# Patient Record
Sex: FEMALE | Race: WHITE | NOT HISPANIC OR LATINO | Employment: PART TIME | URBAN - METROPOLITAN AREA
[De-identification: names, ages, dates, MRNs, and addresses within clinical notes are randomized per-mention and may not be internally consistent; named-entity substitution may affect disease eponyms.]

---

## 2017-10-11 ENCOUNTER — GENERIC CONVERSION - ENCOUNTER (OUTPATIENT)
Dept: OTHER | Facility: OTHER | Age: 62
End: 2017-10-11

## 2018-01-16 NOTE — MISCELLANEOUS
Message   Recorded as Task   Date: 07/26/2016 03:33 PM, Created By: Juan James   Task Name: Follow Up   Assigned To: Don Olsen   Regarding Patient: Alana Brown, Status: In Progress   Comment:    Tamika Yang - 26 Jul 2016 3:33 PM     TASK CREATED  Caller: Self; General Medical Question; [phone] (Home)  PT CALLED AND IS DOING BETTER  FEELING FINE TODAY, HAD BEEN TOLD TO CALL AND LET YOU KNOW HOW SHE WAS DOING  Bartoloyamilex Gabriel Gould - 26 Jul 2016 7:39 PM     TASK REASSIGNED: Previously Assigned To Gabriel Guillory     tell abel to stay on the medication for a while   come in to see me in a month   Niurka,April - 27 Jul 2016 8:00 AM     TASK EDITED  Patient was called and informed, she states she will call back to make an appt   Niurka,April - 27 Jul 2016 8:00 AM     TASK IN PROGRESS        Active Problems    1  Adverse Effect Of Ampicillin (E930 0)   2  Allergic rhinitis (477 9) (J30 9)   3  Arthritis (716 90) (M19 90)   4  Blood tests for routine general physical examination (V72 62) (Z00 00)   5  Breast cancer screening (V76 10) (Z12 39)   6  Colitis (558 9) (K52 9)   7  Compression Arthralgia Of The Ankle / Foot (719 47)   8  Diverticulosis (562 10) (K57 90)   9  Influenza vaccine needed (V04 81) (Z23)   10  Insect bites (919 4,E906 4) (W57 XXXA)   11  Internal Hemorrhoids (455 0)   12  Lower abdominal pain (789 09) (R10 30)   13  Need for pneumococcal vaccine (V03 82) (Z23)   14  Obesity (278 00) (E66 9)   15  Reactive airway disease (493 90) (J45 909)   16  Rectal polyp (569 0) (K62 1)   17  Screening for colon cancer (V76 51) (Z12 11)   18  Splinter of finger without major open wound with infection (915 7) (S60 459A,L08 9)    Current Meds   1  Aleve 220 MG Oral Tablet; 2 tabs 2x daily; Therapy: (Recorded:[id number]) to Recorded   2  LORazepam 0 5 MG Oral Tablet; TAKE 1 TABLET TWICE DAILY; Therapy: [id number] to (Evaluate:68Njs8023); Last Rx:[id number] Ordered    Allergies    1   Ampicillin CAPS 2  Anaprox TABS   3  Cephalosporins   4   Penicillins    Signatures   Electronically signed by : Josy Muñiz, ; Jul 27 2016  8:01AM EST                       (Author)

## 2019-12-11 ENCOUNTER — OFFICE VISIT (OUTPATIENT)
Dept: FAMILY MEDICINE CLINIC | Facility: CLINIC | Age: 64
End: 2019-12-11
Payer: COMMERCIAL

## 2019-12-11 VITALS
HEIGHT: 65 IN | OXYGEN SATURATION: 97 % | WEIGHT: 180.6 LBS | SYSTOLIC BLOOD PRESSURE: 108 MMHG | BODY MASS INDEX: 30.09 KG/M2 | DIASTOLIC BLOOD PRESSURE: 76 MMHG | TEMPERATURE: 100.2 F | HEART RATE: 91 BPM

## 2019-12-11 DIAGNOSIS — R05.9 COUGH: ICD-10-CM

## 2019-12-11 DIAGNOSIS — J06.9 UPPER RESPIRATORY TRACT INFECTION, UNSPECIFIED TYPE: ICD-10-CM

## 2019-12-11 DIAGNOSIS — J02.9 SORE THROAT: Primary | ICD-10-CM

## 2019-12-11 LAB — S PYO AG THROAT QL: NEGATIVE

## 2019-12-11 PROCEDURE — 87880 STREP A ASSAY W/OPTIC: CPT | Performed by: NURSE PRACTITIONER

## 2019-12-11 PROCEDURE — 99213 OFFICE O/P EST LOW 20 MIN: CPT | Performed by: NURSE PRACTITIONER

## 2019-12-11 PROCEDURE — 1036F TOBACCO NON-USER: CPT | Performed by: NURSE PRACTITIONER

## 2019-12-11 PROCEDURE — 3008F BODY MASS INDEX DOCD: CPT | Performed by: NURSE PRACTITIONER

## 2019-12-11 RX ORDER — DOXYCYCLINE 100 MG/1
100 CAPSULE ORAL 2 TIMES DAILY
Qty: 14 CAPSULE | Refills: 0 | Status: SHIPPED | OUTPATIENT
Start: 2019-12-11 | End: 2019-12-18

## 2019-12-11 RX ORDER — BENZONATATE 100 MG/1
100 CAPSULE ORAL 3 TIMES DAILY PRN
Qty: 20 CAPSULE | Refills: 0 | Status: SHIPPED | OUTPATIENT
Start: 2019-12-11 | End: 2020-11-05 | Stop reason: ALTCHOICE

## 2019-12-11 RX ORDER — NAPROXEN SODIUM 220 MG
TABLET ORAL
COMMUNITY
End: 2020-11-05 | Stop reason: ALTCHOICE

## 2019-12-11 RX ORDER — EFINACONAZOLE 100 MG/ML
SOLUTION TOPICAL
COMMUNITY
Start: 2019-10-21 | End: 2021-12-13

## 2019-12-11 NOTE — PROGRESS NOTES
Assessment/Plan   Problem List Items Addressed This Visit     None      Visit Diagnoses     Sore throat    -  Primary    Relevant Orders    POCT rapid strepA (Completed)    Throat culture    Upper respiratory tract infection, unspecified type        Relevant Medications    doxycycline monohydrate (MONODOX) 100 mg capsule    Cough        Relevant Medications    benzonatate (TESSALON PERLES) 100 mg capsule                Chief Complaint   Patient presents with    Cold Like Symptoms     headache, cough, off and on fever for 4 days        Subjective   Patient ID: Charles Allen is a 75 y o  female  Vitals:    12/11/19 1037   BP: 108/76   Pulse: 91   Temp: 100 2 °F (37 9 °C)   SpO2: 97%     Sinusitis   This is a new problem  The current episode started in the past 7 days  The problem has been gradually worsening since onset  The maximum temperature recorded prior to her arrival was 101 - 101 9 F  The fever has been present for 1 to 2 days  Her pain is at a severity of 5/10  The pain is moderate  Associated symptoms include chills, congestion, coughing, ear pain, headaches, sinus pressure, a sore throat and swollen glands  Pertinent negatives include no neck pain or shortness of breath  Past treatments include acetaminophen  The treatment provided mild relief  The following portions of the patient's history were reviewed and updated as appropriate: allergies, current medications, past family history, past social history, past surgical history and problem list     Review of Systems   Constitutional: Positive for chills  HENT: Positive for congestion, ear pain, sinus pressure and sore throat  Eyes: Negative  Respiratory: Positive for cough  Negative for shortness of breath  Cardiovascular: Negative  Gastrointestinal: Negative  Endocrine: Negative  Genitourinary: Negative  Musculoskeletal: Negative  Negative for neck pain  Skin: Negative  Allergic/Immunologic: Negative      Neurological: Positive for headaches  Hematological: Negative  Psychiatric/Behavioral: Negative  Objective     Physical Exam   Constitutional: She is oriented to person, place, and time  She appears well-developed and well-nourished  No distress  HENT:   Head: Normocephalic and atraumatic  Right Ear: External ear normal  Tympanic membrane is not perforated, not erythematous and not bulging  A middle ear effusion is present  Left Ear: External ear normal  Tympanic membrane is erythematous and bulging  Tympanic membrane is not perforated  A middle ear effusion is present  Nose: Mucosal edema and sinus tenderness present  Right sinus exhibits frontal sinus tenderness  Right sinus exhibits no maxillary sinus tenderness  Left sinus exhibits frontal sinus tenderness  Mouth/Throat: Uvula is midline and mucous membranes are normal  Posterior oropharyngeal erythema present  Tonsils are 1+ on the right  Tonsils are 1+ on the left  No tonsillar exudate  Eyes: Conjunctivae are normal  Right eye exhibits no discharge  Left eye exhibits no discharge  Neck: Normal range of motion  Neck supple  No thyromegaly present  Cardiovascular: Normal rate, regular rhythm, normal heart sounds and intact distal pulses  No murmur heard  Pulmonary/Chest: Effort normal and breath sounds normal    Abdominal: Soft  Bowel sounds are normal  She exhibits no distension  There is no tenderness  Musculoskeletal: Normal range of motion  She exhibits no edema or deformity  Lymphadenopathy:     She has cervical adenopathy  Neurological: She is alert and oriented to person, place, and time  Skin: Skin is warm and dry  Capillary refill takes less than 2 seconds  She is not diaphoretic  Psychiatric: She has a normal mood and affect  Her behavior is normal  Judgment and thought content normal    Nursing note and vitals reviewed      Allergies   Allergen Reactions    Naproxen     Penicillins      There is no problem list on file for this patient  Current Outpatient Medications:     JUBLIA 10 % SOLN, , Disp: , Rfl:     naproxen sodium (ALEVE) 220 MG tablet, Take by mouth, Disp: , Rfl:     benzonatate (TESSALON PERLES) 100 mg capsule, Take 1 capsule (100 mg total) by mouth 3 (three) times a day as needed for cough, Disp: 20 capsule, Rfl: 0    doxycycline monohydrate (MONODOX) 100 mg capsule, Take 1 capsule (100 mg total) by mouth 2 (two) times a day for 7 days, Disp: 14 capsule, Rfl: 0  Social History     Socioeconomic History    Marital status: /Civil Union     Spouse name: Not on file    Number of children: Not on file    Years of education: Not on file    Highest education level: Not on file   Occupational History    Not on file   Social Needs    Financial resource strain: Not on file    Food insecurity:     Worry: Not on file     Inability: Not on file    Transportation needs:     Medical: Not on file     Non-medical: Not on file   Tobacco Use    Smoking status: Never Smoker    Smokeless tobacco: Never Used   Substance and Sexual Activity    Alcohol use: Not on file    Drug use: Not on file    Sexual activity: Not on file   Lifestyle    Physical activity:     Days per week: Not on file     Minutes per session: Not on file    Stress: Not on file   Relationships    Social connections:     Talks on phone: Not on file     Gets together: Not on file     Attends Gnosticism service: Not on file     Active member of club or organization: Not on file     Attends meetings of clubs or organizations: Not on file     Relationship status: Not on file    Intimate partner violence:     Fear of current or ex partner: Not on file     Emotionally abused: Not on file     Physically abused: Not on file     Forced sexual activity: Not on file   Other Topics Concern    Not on file   Social History Narrative    Not on file     No family history on file

## 2019-12-11 NOTE — PATIENT INSTRUCTIONS
1  Try tessalon pearls for cough  2  Coricidin HBP for sinus congestion  3   Antibiotic - doxycycline  Twice daily

## 2019-12-14 LAB — B-HEM STREP SPEC QL CULT: NEGATIVE

## 2020-11-05 ENCOUNTER — OFFICE VISIT (OUTPATIENT)
Dept: FAMILY MEDICINE CLINIC | Facility: CLINIC | Age: 65
End: 2020-11-05
Payer: MEDICARE

## 2020-11-05 VITALS
SYSTOLIC BLOOD PRESSURE: 130 MMHG | HEART RATE: 93 BPM | DIASTOLIC BLOOD PRESSURE: 78 MMHG | TEMPERATURE: 98.5 F | OXYGEN SATURATION: 99 % | BODY MASS INDEX: 31.22 KG/M2 | WEIGHT: 187.4 LBS | HEIGHT: 65 IN

## 2020-11-05 DIAGNOSIS — Z23 NEED FOR SHINGLES VACCINE: ICD-10-CM

## 2020-11-05 DIAGNOSIS — Z23 NEED FOR INFLUENZA VACCINATION: ICD-10-CM

## 2020-11-05 DIAGNOSIS — Z13.6 SCREENING FOR CARDIOVASCULAR CONDITION: ICD-10-CM

## 2020-11-05 DIAGNOSIS — Z11.59 NEED FOR HEPATITIS C SCREENING TEST: ICD-10-CM

## 2020-11-05 DIAGNOSIS — Z23 NEED FOR VACCINATION WITH 13-POLYVALENT PNEUMOCOCCAL CONJUGATE VACCINE: ICD-10-CM

## 2020-11-05 DIAGNOSIS — Z13.1 SCREENING FOR DIABETES MELLITUS: ICD-10-CM

## 2020-11-05 DIAGNOSIS — Z00.00 MEDICARE ANNUAL WELLNESS VISIT, INITIAL: Primary | ICD-10-CM

## 2020-11-05 PROCEDURE — 90670 PCV13 VACCINE IM: CPT | Performed by: FAMILY MEDICINE

## 2020-11-05 PROCEDURE — G0438 PPPS, INITIAL VISIT: HCPCS | Performed by: FAMILY MEDICINE

## 2020-11-05 PROCEDURE — G0008 ADMIN INFLUENZA VIRUS VAC: HCPCS | Performed by: FAMILY MEDICINE

## 2020-11-05 PROCEDURE — 90662 IIV NO PRSV INCREASED AG IM: CPT | Performed by: FAMILY MEDICINE

## 2020-11-05 PROCEDURE — G0009 ADMIN PNEUMOCOCCAL VACCINE: HCPCS | Performed by: FAMILY MEDICINE

## 2020-11-05 RX ORDER — IBUPROFEN 200 MG
200 TABLET ORAL EVERY 6 HOURS PRN
COMMUNITY

## 2020-11-05 RX ORDER — ZOSTER VACCINE RECOMBINANT, ADJUVANTED 50 MCG/0.5
0.5 KIT INTRAMUSCULAR ONCE
Qty: 1 EACH | Refills: 1 | Status: SHIPPED | OUTPATIENT
Start: 2020-11-05 | End: 2020-11-05

## 2020-11-18 LAB
ALBUMIN SERPL-MCNC: 4.5 G/DL (ref 3.8–4.8)
ALBUMIN/GLOB SERPL: 2 {RATIO} (ref 1.2–2.2)
ALP SERPL-CCNC: 67 IU/L (ref 39–117)
ALT SERPL-CCNC: 11 IU/L (ref 0–32)
AST SERPL-CCNC: 15 IU/L (ref 0–40)
BILIRUB SERPL-MCNC: 0.9 MG/DL (ref 0–1.2)
BUN SERPL-MCNC: 17 MG/DL (ref 8–27)
BUN/CREAT SERPL: 29 (ref 12–28)
CALCIUM SERPL-MCNC: 9.5 MG/DL (ref 8.7–10.3)
CHLORIDE SERPL-SCNC: 103 MMOL/L (ref 96–106)
CHOLEST SERPL-MCNC: 198 MG/DL (ref 100–199)
CHOLEST/HDLC SERPL: 3.5 RATIO (ref 0–4.4)
CO2 SERPL-SCNC: 23 MMOL/L (ref 20–29)
CREAT SERPL-MCNC: 0.58 MG/DL (ref 0.57–1)
GLOBULIN SER-MCNC: 2.3 G/DL (ref 1.5–4.5)
GLUCOSE SERPL-MCNC: 91 MG/DL (ref 65–99)
HCV AB S/CO SERPL IA: <0.1 S/CO RATIO (ref 0–0.9)
HDLC SERPL-MCNC: 57 MG/DL
LDLC SERPL CALC-MCNC: 127 MG/DL (ref 0–99)
POTASSIUM SERPL-SCNC: 4.3 MMOL/L (ref 3.5–5.2)
PROT SERPL-MCNC: 6.8 G/DL (ref 6–8.5)
SL AMB EGFR AFRICAN AMERICAN: 111 ML/MIN/1.73
SL AMB EGFR NON AFRICAN AMERICAN: 96 ML/MIN/1.73
SL AMB VLDL CHOLESTEROL CALC: 14 MG/DL (ref 5–40)
SODIUM SERPL-SCNC: 140 MMOL/L (ref 134–144)
TRIGL SERPL-MCNC: 76 MG/DL (ref 0–149)

## 2020-12-03 ENCOUNTER — OFFICE VISIT (OUTPATIENT)
Dept: URGENT CARE | Facility: CLINIC | Age: 65
End: 2020-12-03
Payer: MEDICARE

## 2020-12-03 VITALS
OXYGEN SATURATION: 98 % | SYSTOLIC BLOOD PRESSURE: 132 MMHG | HEART RATE: 80 BPM | TEMPERATURE: 98.5 F | BODY MASS INDEX: 30.66 KG/M2 | RESPIRATION RATE: 16 BRPM | DIASTOLIC BLOOD PRESSURE: 74 MMHG | WEIGHT: 184 LBS | HEIGHT: 65 IN

## 2020-12-03 DIAGNOSIS — S61.451A HUMAN BITE OF RIGHT HAND, INITIAL ENCOUNTER: Primary | ICD-10-CM
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
## 2020-12-03 DIAGNOSIS — Z23 NEED FOR TETANUS BOOSTER: ICD-10-CM

## 2020-12-03 PROCEDURE — 99213 OFFICE O/P EST LOW 20 MIN: CPT | Performed by: PHYSICIAN ASSISTANT

## 2020-12-03 PROCEDURE — 90715 TDAP VACCINE 7 YRS/> IM: CPT

## 2020-12-03 PROCEDURE — G0463 HOSPITAL OUTPT CLINIC VISIT: HCPCS | Performed by: PHYSICIAN ASSISTANT

## 2020-12-03 PROCEDURE — 90471 IMMUNIZATION ADMIN: CPT | Performed by: PHYSICIAN ASSISTANT

## 2020-12-03 RX ORDER — DOXYCYCLINE HYCLATE 100 MG/1
100 CAPSULE ORAL EVERY 12 HOURS SCHEDULED
Qty: 14 CAPSULE | Refills: 0 | Status: SHIPPED | OUTPATIENT
Start: 2020-12-03 | End: 2020-12-10

## 2021-03-04 DIAGNOSIS — Z23 ENCOUNTER FOR IMMUNIZATION: ICD-10-CM

## 2021-03-18 ENCOUNTER — IMMUNIZATIONS (OUTPATIENT)
Dept: FAMILY MEDICINE CLINIC | Facility: HOSPITAL | Age: 66
End: 2021-03-18

## 2021-03-18 DIAGNOSIS — Z23 ENCOUNTER FOR IMMUNIZATION: Primary | ICD-10-CM

## 2021-03-18 PROCEDURE — 0001A SARS-COV-2 / COVID-19 MRNA VACCINE (PFIZER-BIONTECH) 30 MCG: CPT

## 2021-03-18 PROCEDURE — 91300 SARS-COV-2 / COVID-19 MRNA VACCINE (PFIZER-BIONTECH) 30 MCG: CPT

## 2021-04-12 ENCOUNTER — IMMUNIZATIONS (OUTPATIENT)
Dept: FAMILY MEDICINE CLINIC | Facility: HOSPITAL | Age: 66
End: 2021-04-12

## 2021-04-12 DIAGNOSIS — Z23 ENCOUNTER FOR IMMUNIZATION: Primary | ICD-10-CM

## 2021-04-12 PROCEDURE — 91300 SARS-COV-2 / COVID-19 MRNA VACCINE (PFIZER-BIONTECH) 30 MCG: CPT

## 2021-04-12 PROCEDURE — 0002A SARS-COV-2 / COVID-19 MRNA VACCINE (PFIZER-BIONTECH) 30 MCG: CPT

## 2021-04-19 ENCOUNTER — ANNUAL EXAM (OUTPATIENT)
Dept: OBGYN CLINIC | Facility: CLINIC | Age: 66
End: 2021-04-19
Payer: MEDICARE

## 2021-04-19 VITALS
DIASTOLIC BLOOD PRESSURE: 72 MMHG | HEIGHT: 63 IN | BODY MASS INDEX: 32.85 KG/M2 | WEIGHT: 185.4 LBS | SYSTOLIC BLOOD PRESSURE: 120 MMHG

## 2021-04-19 DIAGNOSIS — Z12.31 BREAST CANCER SCREENING BY MAMMOGRAM: Primary | ICD-10-CM

## 2021-04-19 DIAGNOSIS — N81.2 CYSTOCELE AND RECTOCELE WITH INCOMPLETE UTEROVAGINAL PROLAPSE: ICD-10-CM

## 2021-04-19 DIAGNOSIS — N95.2 VAGINAL ATROPHY: ICD-10-CM

## 2021-04-19 DIAGNOSIS — E28.39 ESTROGEN DEFICIENCY: ICD-10-CM

## 2021-04-19 DIAGNOSIS — Z46.89 PESSARY MAINTENANCE: ICD-10-CM

## 2021-04-19 PROBLEM — Z96.0 VAGINAL PESSARY IN SITU: Status: ACTIVE | Noted: 2021-04-19

## 2021-04-19 PROCEDURE — G0101 CA SCREEN;PELVIC/BREAST EXAM: HCPCS | Performed by: STUDENT IN AN ORGANIZED HEALTH CARE EDUCATION/TRAINING PROGRAM

## 2021-04-19 RX ORDER — ESTRADIOL 0.1 MG/G
1 CREAM VAGINAL WEEKLY
COMMUNITY
Start: 2020-01-06 | End: 2021-12-13

## 2021-04-19 NOTE — PATIENT INSTRUCTIONS
Recommended calcium intake: 1200mg daily  Recommended vitamin D intake: 600 IU daily (prior to age 79), 800 IU (age 79 and older)    Bone Densitometry   WHAT YOU NEED TO KNOW:   What is bone densitometry? Bone densitometry is a scan (test) that measures bone density  A loss of density may increase your risk for osteoporosis  Bone densitometry is also called a dual energy x-ray absorptiometry (DXA) scan  DXA scans use x-rays to show if your bones have lost minerals, such as calcium, causing them to become weak  DXA scans are usually done on your hip, spine, or forearm  A DXA scan can also be done of your entire body  Why do I need a DXA scan? You may need a DXA scan to diagnose osteoporosis, or to check your risk of bone fractures  Your healthcare provider can also monitor changes in your bone density  A DXA scan is recommended for healthy women 72 years or older, and healthy men 79 years or older  The scan is also recommended for younger women and men who are at high risk for bone loss  What increases my risk for bone loss? · Eating foods low in calcium or vitamin D    · Low body weight or low estrogen levels in women    · Health conditions such as diabetes, overactive thyroid, or celiac disease    · Medicines such as steroids, and androgen deprivation therapy for men    · Previous bone fracture    · Prolonged immobilization    · Smoking or abuse of alcohol  What do I need to know about bone densitometry? · Before your DXA scan  you may be told not to take calcium supplements the day of your scan  Remove any metal that is near the body area being scanned  This includes jewelry, clothing with zippers, coins, body piercings, or an underwire bra  · During the scan  you will lie on the DXA scan table  The scanner will pass over the area and take pictures  Keep still during your scan so the pictures of your bones are clear  The DXA scan lasts between 10 and 30 minutes, depending on the area being scanned  When should I contact my healthcare provider? · You will be late or cannot make it to your DXA scan  · You think you are pregnant  When should I seek immediate care or call 911? · You fall and think you may have a bone fracture  · Your condition or symptoms suddenly get worse  CARE AGREEMENT:   You have the right to help plan your care  Learn about your health condition and how it may be treated  Discuss treatment options with your caregivers to decide what care you want to receive  You always have the right to refuse treatment  The above information is an  only  It is not intended as medical advice for individual conditions or treatments  Talk to your doctor, nurse or pharmacist before following any medical regimen to see if it is safe and effective for you  © 2017 [employer] Information is for End User's use only and may not be sold, redistributed or otherwise used for commercial purposes  All illustrations and images included in CareNotes® are the copyrighted property of [employer]  or Walter Calvo

## 2021-04-19 NOTE — PROGRESS NOTES
[address] [facility]   [address], [place], [place]    ASSESSMENT/PLAN: Thierry Galloway is a 77 y o  [id number] who presents for annual gynecologic exam     Encounter for routine gynecologic examination  - Routine well woman exam completed today  - Cervical Cancer Screening complete   - STI screening offered including HIV  Declines, as she is not sexually active  Has had Hep C screening    - Breast Cancer Screening: Last Mammogram 01/30/2020,   - Colorectal cancer screening was not ordered  She is up to date  - Osteoporosis screening: Never had  Ordered today  - The following were reviewed in today's visit: breast self exam, mammography screening ordered, use and side effects of HRT, menopause, exercise, healthy diet and DEXA ordered  - Pessary removed, cleaned, and replaced  No evidence of erosion  Additional problems addressed during this visit:  1  Breast cancer screening by mammogram  -     Mammo screening bilateral w 3d & cad; Future; Expected date: 04/15/2022    2  Vaginal atrophy  Assessment & Plan:  - Stable atrophy  Will continue vaginal estrogen once weekly  3  Estrogen deficiency  -     DXA bone density spine hip and pelvis; Future    4  Cystocele and rectocele with incomplete uterovaginal prolapse    5  Pessary maintenance      CC:  Annual Gynecologic Examination    HPI: Thierry Galloway is a 77 y o  [id number] who presents for annual gynecologic examination       ROS: Negative except as noted in HPI    PHQ-A Depression Screening    Feeling down, depressed, irritable or hopeless: 0 - not at all  Little interest or pleasure in doing things: 0 - not at all         The following portions of the patient's history were reviewed and updated as appropriate:  Past Medical History:   Diagnosis Date    Obesity     Papanicolaou smear 01/27/2020    neg    Rectocele     Uterine prolapse 2020     Past Surgical History:   Procedure Laterality Date   [address] [facility] [address]    x7    HERNIA REPAIR  2013    [facility]    NEUROPLASTY / TRANSPOSITION MEDIAN NERVE AT CARPAL TUNNEL  2012     Family History   Problem Relation Age of Onset    Stroke Father     Heart disease Father     Diabetes Father     Hypertension Maternal Grandmother     Hypertension Paternal Grandmother      Social History     Socioeconomic History    Marital status: /Civil Union     Spouse name: None    Number of children: 1    Years of education: None    Highest education level: None   Occupational History    None   Social Needs    Financial resource strain: None    Food insecurity     Worry: None     Inability: None    Transportation needs     Medical: None     Non-medical: None   Tobacco Use    Smoking status: Never Smoker    Smokeless tobacco: Never Used   Substance and Sexual Activity    Alcohol use: Yes     Frequency: 2-3 times a week     Drinks per session: 1 or 2     Binge frequency: Never    Drug use: Never    Sexual activity: Not Currently     Birth control/protection: Post-menopausal   Lifestyle    Physical activity     Days per week: None     Minutes per session: None    Stress: None   Relationships    Social connections     Talks on phone: None     Gets together: None     Attends Yarsani service: None     Active member of club or organization: None     Attends meetings of clubs or organizations: None     Relationship status:     Intimate partner violence     Fear of current or ex partner: No     Emotionally abused: No     Physically abused: No     Forced sexual activity: No   Other Topics Concern    None   Social History Narrative    Exercises occasionally     Outpatient Medications Marked as Taking for the 4/19/21 encounter (Annual Exam) with Regi Leo MD   Medication    estradiol (ESTRACE) 0 1 mg/g vaginal cream    ibuprofen (MOTRIN) 200 mg tablet    JUBLIA 10 % SOLN     Allergies   Allergen Reactions    Naproxen Anaphylaxis    Penicillins Rash         Objective:  /72   Ht 5' 2 5" (1 588 m)   Wt 84 1 kg (185 lb 6 4 oz)   Breastfeeding No   BMI 33 37 kg/m²      General Appearance: alert and oriented, in no acute distress  Neck/Thyroid: No thyromegaly, no thyroid nodules  Respiratory: Unlabored breathing  Cardiovascular: Regular rate, no peripheral edema  Abdomen: Soft, non-tender, non-distended, no masses, no rebound or guarding  Breast Exam: No dimpling, nipple retraction or discharge  No lumps or masses  No axillary or supraclavicular nodes  Pelvic:       External genitalia: Normal appearance, no abnormal pigmentation, no lesions or masses  Normal [place]th[place]'s [place]'s  Urinary system: Urethral meatus normal, bladder non-tender  Vaginal: Vaginal pessary removed, cleaned, and replaced  Stable grade 3 prolapse  Stable vaginal atrophy without erosions  Normal-appearing physiologic discharge  Cervix: Normal, no masses  No cervical motion tenderness  Adnexa: No adnexal masses or tenderness noted  Uterus: Small, normal post-menopausal size, regular contour, midline, mobile, no uterine tenderness  Rectovaginal: Normal tone  No nodularity or masses noted  Extremities: Normal range of motion  Warm, well-perfused, non-tender     Skin: normal, no rash or abnormalities  Neurologic: alert, oriented x3  Psychiatric: Appropriate affect, mood stable, cooperative with exam     Ismael Umanzor MD  4/19/2021 10:31 AM

## 2021-05-19 DIAGNOSIS — E28.39 ESTROGEN DEFICIENCY: ICD-10-CM

## 2021-05-20 DIAGNOSIS — Z12.31 BREAST CANCER SCREENING BY MAMMOGRAM: ICD-10-CM

## 2021-07-12 ENCOUNTER — OFFICE VISIT (OUTPATIENT)
Dept: OBGYN CLINIC | Facility: CLINIC | Age: 66
End: 2021-07-12
Payer: MEDICARE

## 2021-07-12 VITALS
SYSTOLIC BLOOD PRESSURE: 122 MMHG | BODY MASS INDEX: 33.63 KG/M2 | WEIGHT: 189.8 LBS | DIASTOLIC BLOOD PRESSURE: 68 MMHG | HEIGHT: 63 IN

## 2021-07-12 DIAGNOSIS — Z96.0 VAGINAL PESSARY IN SITU: ICD-10-CM

## 2021-07-12 DIAGNOSIS — N95.2 VAGINAL ATROPHY: ICD-10-CM

## 2021-07-12 DIAGNOSIS — N81.2 CYSTOCELE AND RECTOCELE WITH INCOMPLETE UTEROVAGINAL PROLAPSE: Primary | ICD-10-CM

## 2021-07-12 PROCEDURE — 99213 OFFICE O/P EST LOW 20 MIN: CPT | Performed by: STUDENT IN AN ORGANIZED HEALTH CARE EDUCATION/TRAINING PROGRAM

## 2021-07-12 NOTE — PROGRESS NOTES
[address]   [address], [facility], [place]    Assessment/Plan:  1  Cystocele and rectocele with incomplete uterovaginal prolapse    2  Vaginal pessary in situ  Assessment & Plan:  - Pessary removed, cleaned, and replaced  - No evidence of vaginal erosions or vaginitis  - Return for pessary cleaning in 3 months  3  Vaginal atrophy  Assessment & Plan:  - Stable atrophy  Continue Estrace once weekly  Subjective:   Jack Cherry is a 77 y o  [id number]   CC:   Chief Complaint   Patient presents with    Pessary Check     cleaning     HPI: Patient presents for pessary cleaning  She denies vaginal discharge, irritation, vaginal bleeding, urinary/fecal incontinence, dysuria, urinary urgency/frequency  Prolapse symptoms well controlled with pessary  ROS: Negative except as noted in HPI    The following portions of the patient's history were reviewed and updated as appropriate:   Past Medical History:   Diagnosis Date    Obesity     Papanicolaou smear 01/27/2020    neg    Rectocele     Uterine prolapse 2020     Past Surgical History:   Procedure Laterality Date    DILATION AND EVACUATION  1980    x7    HERNIA REPAIR  2013    [facility]    NEUROPLASTY / TRANSPOSITION MEDIAN NERVE AT CARPAL TUNNEL  2012     Family History   Problem Relation Age of Onset    Stroke Father     Heart disease Father     Diabetes Father     Hypertension Maternal Grandmother     Hypertension Paternal Grandmother      Social History     Socioeconomic History    Marital status: /Civil Union     Spouse name: None    Number of children: 1    Years of education: None    Highest education level: None   Occupational History    None   Tobacco Use    Smoking status: Never Smoker    Smokeless tobacco: Never Used   Vaping Use    Vaping Use: Never used   Substance and Sexual Activity    Alcohol use:  Yes    Drug use: Never    Sexual activity: Not Currently     Birth control/protection: Post-menopausal   Other Topics Concern    None   Social History Narrative    Exercises occasionally     Social Determinants of Health     Financial Resource Strain:     Difficulty of Paying Living Expenses:    Food Insecurity:     Worried About Running Out of Food in the Last Year:     [address] in the Last Year:    Transportation Needs:     Lack of Transportation (Medical):  Lack of Transportation (Non-Medical):    Physical Activity:     Days of Exercise per Week:     Minutes of Exercise per Session:    Stress:     Feeling of Stress :    Social Connections: Unknown    Frequency of Communication with Friends and Family: Not on file    Frequency of Social Gatherings with Friends and Family: Not on file    Attends Mandaeism Services: Not on file    Active Member of [employer] Group or Organizations: Not on file    Attends Club or Organization Meetings: Not on file    Marital Status:    Intimate Partner Violence: Not At Risk    Fear of Current or Ex-Partner: No    Emotionally Abused: No    Physically Abused: No    Sexually Abused: No     Outpatient Medications Marked as Taking for the 7/12/21 encounter (Office Visit) with Shauna Tse MD   Medication    Calcium Carb-Cholecalciferol 600-800 MG-UNIT TABS    estradiol (ESTRACE) 0 1 mg/g vaginal cream    ibuprofen (MOTRIN) 200 mg tablet     Allergies   Allergen Reactions    Naproxen Anaphylaxis    Ampicillin Rash    Penicillins Rash           Objective:  /68 (BP Location: Left arm, Patient Position: Sitting, Cuff Size: Large)   Ht 5' 3" (1 6 m)   Wt 86 1 kg (189 lb 12 8 oz)   Breastfeeding No   BMI 33 62 kg/m²        Chaperone present? Yes: Mary Cantu MA  General Appearance: alert and oriented, in no acute distress  Abdomen: Soft, non-tender, non-distended, no masses, no rebound or guarding    Pelvic:       External genitalia: Normal appearance, no abnormal pigmentation, no lesions or

## 2021-07-12 NOTE — ASSESSMENT & PLAN NOTE
- Pessary removed, cleaned, and replaced  - No evidence of vaginal erosions or vaginitis  - Return for pessary cleaning in 3 months

## 2021-08-25 ENCOUNTER — VBI (OUTPATIENT)
Dept: ADMINISTRATIVE | Facility: OTHER | Age: 66
End: 2021-08-25

## 2021-08-25 NOTE — TELEPHONE ENCOUNTER
Upon review of the In Basket request we were able to locate, review, and update the patient chart as requested for Pap Smear (HPV) aka Cervical Cancer Screening  Any additional questions or concerns should be emailed to the Practice Liaisons via [email or web address] email, please do not reply via In Basket      Thank you  Roxi Freedman

## 2021-10-18 ENCOUNTER — OFFICE VISIT (OUTPATIENT)
Dept: OBGYN CLINIC | Facility: CLINIC | Age: 66
End: 2021-10-18
Payer: MEDICARE

## 2021-10-18 VITALS
HEIGHT: 65 IN | DIASTOLIC BLOOD PRESSURE: 80 MMHG | BODY MASS INDEX: 31.49 KG/M2 | WEIGHT: 189 LBS | SYSTOLIC BLOOD PRESSURE: 130 MMHG

## 2021-10-18 DIAGNOSIS — Z96.0 VAGINAL PESSARY IN SITU: ICD-10-CM

## 2021-10-18 DIAGNOSIS — N81.2 CYSTOCELE AND RECTOCELE WITH INCOMPLETE UTEROVAGINAL PROLAPSE: ICD-10-CM

## 2021-10-18 DIAGNOSIS — N95.2 VAGINAL ATROPHY: ICD-10-CM

## 2021-10-18 DIAGNOSIS — T83.89XA VAGINAL EROSION SECONDARY TO PESSARY USE, INITIAL ENCOUNTER (HCC): Primary | ICD-10-CM

## 2021-10-18 DIAGNOSIS — N89.8 VAGINAL EROSION SECONDARY TO PESSARY USE, INITIAL ENCOUNTER (HCC): Primary | ICD-10-CM

## 2021-10-18 PROCEDURE — 99213 OFFICE O/P EST LOW 20 MIN: CPT | Performed by: STUDENT IN AN ORGANIZED HEALTH CARE EDUCATION/TRAINING PROGRAM

## 2021-10-20 ENCOUNTER — TELEPHONE (OUTPATIENT)
Dept: OBGYN CLINIC | Facility: CLINIC | Age: 66
End: 2021-10-20

## 2021-10-20 DIAGNOSIS — N95.2 VAGINAL ATROPHY: Primary | ICD-10-CM

## 2021-10-20 RX ORDER — ESTRADIOL 0.1 MG/G
1 CREAM VAGINAL DAILY
Qty: 42.5 G | Refills: 3 | Status: SHIPPED | OUTPATIENT
Start: 2021-10-20 | End: 2021-12-13 | Stop reason: SDUPTHER

## 2021-11-01 ENCOUNTER — OFFICE VISIT (OUTPATIENT)
Dept: OBGYN CLINIC | Facility: CLINIC | Age: 66
End: 2021-11-01
Payer: MEDICARE

## 2021-11-01 VITALS — DIASTOLIC BLOOD PRESSURE: 82 MMHG | BODY MASS INDEX: 31.28 KG/M2 | SYSTOLIC BLOOD PRESSURE: 134 MMHG | WEIGHT: 188 LBS

## 2021-11-01 DIAGNOSIS — N95.2 VAGINAL ATROPHY: ICD-10-CM

## 2021-11-01 DIAGNOSIS — N89.8 VAGINAL EROSION SECONDARY TO PESSARY USE, SUBSEQUENT ENCOUNTER: Primary | ICD-10-CM

## 2021-11-01 DIAGNOSIS — T83.89XD VAGINAL EROSION SECONDARY TO PESSARY USE, SUBSEQUENT ENCOUNTER: Primary | ICD-10-CM

## 2021-11-01 DIAGNOSIS — N81.2 CYSTOCELE AND RECTOCELE WITH INCOMPLETE UTEROVAGINAL PROLAPSE: ICD-10-CM

## 2021-11-01 PROCEDURE — 99213 OFFICE O/P EST LOW 20 MIN: CPT | Performed by: STUDENT IN AN ORGANIZED HEALTH CARE EDUCATION/TRAINING PROGRAM

## 2021-11-01 PROCEDURE — 57160 INSERT PESSARY/OTHER DEVICE: CPT | Performed by: STUDENT IN AN ORGANIZED HEALTH CARE EDUCATION/TRAINING PROGRAM

## 2021-11-08 ENCOUNTER — TELEPHONE (OUTPATIENT)
Dept: OBGYN CLINIC | Facility: CLINIC | Age: 66
End: 2021-11-08

## 2021-11-15 ENCOUNTER — OFFICE VISIT (OUTPATIENT)
Dept: OBGYN CLINIC | Facility: CLINIC | Age: 66
End: 2021-11-15
Payer: MEDICARE

## 2021-11-15 ENCOUNTER — TELEPHONE (OUTPATIENT)
Dept: OBGYN CLINIC | Facility: CLINIC | Age: 66
End: 2021-11-15

## 2021-11-15 VITALS — SYSTOLIC BLOOD PRESSURE: 132 MMHG | DIASTOLIC BLOOD PRESSURE: 84 MMHG | BODY MASS INDEX: 30.62 KG/M2 | WEIGHT: 184 LBS

## 2021-11-15 DIAGNOSIS — N95.2 VAGINAL ATROPHY: ICD-10-CM

## 2021-11-15 DIAGNOSIS — N81.3 UTERINE PROCIDENTIA: ICD-10-CM

## 2021-11-15 DIAGNOSIS — T83.89XD VAGINAL EROSION SECONDARY TO PESSARY USE, SUBSEQUENT ENCOUNTER: Primary | ICD-10-CM

## 2021-11-15 DIAGNOSIS — N89.8 VAGINAL EROSION SECONDARY TO PESSARY USE, SUBSEQUENT ENCOUNTER: Primary | ICD-10-CM

## 2021-11-15 PROCEDURE — 99213 OFFICE O/P EST LOW 20 MIN: CPT | Performed by: STUDENT IN AN ORGANIZED HEALTH CARE EDUCATION/TRAINING PROGRAM

## 2021-11-15 PROCEDURE — A4561 PESSARY RUBBER, ANY TYPE: HCPCS | Performed by: STUDENT IN AN ORGANIZED HEALTH CARE EDUCATION/TRAINING PROGRAM

## 2021-11-16 ENCOUNTER — OFFICE VISIT (OUTPATIENT)
Dept: FAMILY MEDICINE CLINIC | Facility: CLINIC | Age: 66
End: 2021-11-16
Payer: MEDICARE

## 2021-11-16 VITALS
WEIGHT: 185.6 LBS | SYSTOLIC BLOOD PRESSURE: 122 MMHG | HEIGHT: 65 IN | OXYGEN SATURATION: 100 % | TEMPERATURE: 98.6 F | RESPIRATION RATE: 17 BRPM | DIASTOLIC BLOOD PRESSURE: 78 MMHG | HEART RATE: 94 BPM | BODY MASS INDEX: 30.92 KG/M2

## 2021-11-16 DIAGNOSIS — Z00.00 MEDICARE ANNUAL WELLNESS VISIT, SUBSEQUENT: Primary | ICD-10-CM

## 2021-11-16 DIAGNOSIS — N95.2 VAGINAL ATROPHY: ICD-10-CM

## 2021-11-16 DIAGNOSIS — Z13.1 SCREENING FOR DIABETES MELLITUS: ICD-10-CM

## 2021-11-16 DIAGNOSIS — Z23 ENCOUNTER FOR IMMUNIZATION: ICD-10-CM

## 2021-11-16 DIAGNOSIS — Z13.6 SCREENING FOR CARDIOVASCULAR CONDITION: ICD-10-CM

## 2021-11-16 DIAGNOSIS — E66.9 OBESITY (BMI 30.0-34.9): ICD-10-CM

## 2021-11-16 DIAGNOSIS — T83.89XD VAGINAL EROSION SECONDARY TO PESSARY USE, SUBSEQUENT ENCOUNTER: ICD-10-CM

## 2021-11-16 DIAGNOSIS — N89.8 VAGINAL EROSION SECONDARY TO PESSARY USE, SUBSEQUENT ENCOUNTER: ICD-10-CM

## 2021-11-16 DIAGNOSIS — Z23 NEED FOR VACCINATION: ICD-10-CM

## 2021-11-16 PROBLEM — E66.811 OBESITY (BMI 30.0-34.9): Status: ACTIVE | Noted: 2021-11-16

## 2021-11-16 PROCEDURE — 90662 IIV NO PRSV INCREASED AG IM: CPT | Performed by: FAMILY MEDICINE

## 2021-11-16 PROCEDURE — 1123F ACP DISCUSS/DSCN MKR DOCD: CPT | Performed by: FAMILY MEDICINE

## 2021-11-16 PROCEDURE — G0008 ADMIN INFLUENZA VIRUS VAC: HCPCS | Performed by: FAMILY MEDICINE

## 2021-11-16 PROCEDURE — 90732 PPSV23 VACC 2 YRS+ SUBQ/IM: CPT | Performed by: FAMILY MEDICINE

## 2021-11-16 PROCEDURE — G0439 PPPS, SUBSEQ VISIT: HCPCS | Performed by: FAMILY MEDICINE

## 2021-11-16 PROCEDURE — G0009 ADMIN PNEUMOCOCCAL VACCINE: HCPCS | Performed by: FAMILY MEDICINE

## 2021-11-16 RX ORDER — ZOSTER VACCINE RECOMBINANT, ADJUVANTED 50 MCG/0.5
0.5 KIT INTRAMUSCULAR ONCE
Qty: 1 EACH | Refills: 1 | Status: SHIPPED | OUTPATIENT
Start: 2021-11-16 | End: 2021-11-16

## 2021-11-16 RX ORDER — ZOSTER VACCINE RECOMBINANT, ADJUVANTED 50 MCG/0.5
0.5 KIT INTRAMUSCULAR ONCE
Qty: 1 EACH | Refills: 1 | Status: SHIPPED | OUTPATIENT
Start: 2021-11-16 | End: 2021-11-16 | Stop reason: SDUPTHER

## 2021-12-07 ENCOUNTER — TELEPHONE (OUTPATIENT)
Dept: OBGYN CLINIC | Facility: CLINIC | Age: 66
End: 2021-12-07

## 2021-12-13 ENCOUNTER — OFFICE VISIT (OUTPATIENT)
Dept: OBGYN CLINIC | Facility: CLINIC | Age: 66
End: 2021-12-13
Payer: MEDICARE

## 2021-12-13 VITALS
WEIGHT: 185.4 LBS | SYSTOLIC BLOOD PRESSURE: 134 MMHG | BODY MASS INDEX: 30.89 KG/M2 | DIASTOLIC BLOOD PRESSURE: 78 MMHG | RESPIRATION RATE: 14 BRPM | HEART RATE: 80 BPM | HEIGHT: 65 IN

## 2021-12-13 DIAGNOSIS — N95.2 VAGINAL ATROPHY: ICD-10-CM

## 2021-12-13 DIAGNOSIS — Z96.0 VAGINAL PESSARY IN SITU: Primary | ICD-10-CM

## 2021-12-13 DIAGNOSIS — N81.3 UTERINE PROCIDENTIA: ICD-10-CM

## 2021-12-13 PROCEDURE — 99213 OFFICE O/P EST LOW 20 MIN: CPT | Performed by: STUDENT IN AN ORGANIZED HEALTH CARE EDUCATION/TRAINING PROGRAM

## 2021-12-13 RX ORDER — ESTRADIOL 0.1 MG/G
1 CREAM VAGINAL DAILY
Qty: 42.5 G | Refills: 3 | Status: SHIPPED | OUTPATIENT
Start: 2021-12-13

## 2021-12-21 LAB
ALBUMIN SERPL-MCNC: 4.4 G/DL (ref 3.8–4.8)
ALBUMIN/GLOB SERPL: 2.3 {RATIO} (ref 1.2–2.2)
ALP SERPL-CCNC: 67 IU/L (ref 44–121)
ALT SERPL-CCNC: 13 IU/L (ref 0–32)
AST SERPL-CCNC: 14 IU/L (ref 0–40)
BILIRUB SERPL-MCNC: 1.1 MG/DL (ref 0–1.2)
BUN SERPL-MCNC: 12 MG/DL (ref 8–27)
BUN/CREAT SERPL: 19 (ref 12–28)
CALCIUM SERPL-MCNC: 9.2 MG/DL (ref 8.7–10.3)
CHLORIDE SERPL-SCNC: 103 MMOL/L (ref 96–106)
CHOLEST SERPL-MCNC: 199 MG/DL (ref 100–199)
CHOLEST/HDLC SERPL: 3 RATIO (ref 0–4.4)
CO2 SERPL-SCNC: 23 MMOL/L (ref 20–29)
CREAT SERPL-MCNC: 0.62 MG/DL (ref 0.57–1)
GLOBULIN SER-MCNC: 1.9 G/DL (ref 1.5–4.5)
GLUCOSE SERPL-MCNC: 93 MG/DL (ref 65–99)
HDLC SERPL-MCNC: 67 MG/DL
LDLC SERPL CALC-MCNC: 121 MG/DL (ref 0–99)
POTASSIUM SERPL-SCNC: 4.2 MMOL/L (ref 3.5–5.2)
PROT SERPL-MCNC: 6.3 G/DL (ref 6–8.5)
SL AMB EGFR AFRICAN AMERICAN: 107 ML/MIN/1.73
SL AMB EGFR NON AFRICAN AMERICAN: 93 ML/MIN/1.73
SL AMB VLDL CHOLESTEROL CALC: 11 MG/DL (ref 5–40)
SODIUM SERPL-SCNC: 140 MMOL/L (ref 134–144)
TRIGL SERPL-MCNC: 60 MG/DL (ref 0–149)

## 2022-02-16 ENCOUNTER — OFFICE VISIT (OUTPATIENT)
Dept: OBGYN CLINIC | Facility: CLINIC | Age: 67
End: 2022-02-16
Payer: MEDICARE

## 2022-02-16 VITALS
HEIGHT: 63 IN | WEIGHT: 185 LBS | DIASTOLIC BLOOD PRESSURE: 68 MMHG | SYSTOLIC BLOOD PRESSURE: 118 MMHG | BODY MASS INDEX: 32.78 KG/M2

## 2022-02-16 DIAGNOSIS — N95.2 VAGINAL ATROPHY: ICD-10-CM

## 2022-02-16 DIAGNOSIS — N81.3 UTERINE PROCIDENTIA: Primary | ICD-10-CM

## 2022-02-16 PROBLEM — T83.89XA VAGINAL EROSION SECONDARY TO PESSARY USE (HCC): Status: RESOLVED | Noted: 2021-10-18 | Resolved: 2022-02-16

## 2022-02-16 PROBLEM — N89.8 VAGINAL EROSION SECONDARY TO PESSARY USE (HCC): Status: RESOLVED | Noted: 2021-10-18 | Resolved: 2022-02-16

## 2022-02-16 PROCEDURE — 99213 OFFICE O/P EST LOW 20 MIN: CPT | Performed by: STUDENT IN AN ORGANIZED HEALTH CARE EDUCATION/TRAINING PROGRAM

## 2022-02-16 NOTE — PROGRESS NOTES
[address] [facility], [address], [facility], [address]    Assessment/Plan:  1  Uterine procidentia  Assessment & Plan:  - No evidence of erosion today   - Size 5 pessary removed, cleaned, and reinserted today  - Continue vaginal estrogen 2-3 times weekly  - Return for annual exam with pessary check in 2-3 months  2  Vaginal atrophy  Assessment & Plan:  - Continue vaginal estrogen 2-3 times weekly  Subjective:   Javier Whyte is a 74 y o  [id number]   CC:   Chief Complaint   Patient presents with    Pessary Check     cleaning       HPI: Patient presents for pessary cleaning  Reports no discharge or bleeding  Overall satisfied with size 5  Does note a slight sensation of movement of pessary, but adequately treating bulge symptoms  ROS: Negative except as noted in HPI    No LMP recorded (lmp unknown)  Patient is postmenopausal        She  reports previously being sexually active  She reports using the following method of birth control/protection: Post-menopausal        The following portions of the patient's history were reviewed and updated as appropriate:   Past Medical History:   Diagnosis Date    Obesity     Papanicolaou smear 01/27/2020    neg    Rectocele     Uterine prolapse 2020     Past Surgical History:   Procedure Laterality Date    DILATION AND EVACUATION  1980    x7    HERNIA REPAIR  2013    MAMMO (HISTORICAL) Bilateral 01/30/2020    [facility] BI-RADS 2  Benign findings   Scattered areas fibroglandular density; 25% to 50% glandular breast tissue    NEUROPLASTY / TRANSPOSITION MEDIAN NERVE AT CARPAL TUNNEL  2012     Family History   Problem Relation Age of Onset    Stroke Father     Heart disease Father     Diabetes Father     Hypertension Maternal Grandmother     Hypertension Paternal Grandmother      Social History     Socioeconomic History    Marital status: /Civil Union     Spouse name: None    Number of children: 1    Years of education: None  Highest education level: None   Occupational History    None   Tobacco Use    Smoking status: Never Smoker    Smokeless tobacco: Never Used   Vaping Use    Vaping Use: Never used   Substance and Sexual Activity    Alcohol use: Yes    Drug use: Never    Sexual activity: Not Currently     Birth control/protection: Post-menopausal   Other Topics Concern    None   Social History Narrative    Exercises occasionally     Social Determinants of Health     Financial Resource Strain: Not on file   Food Insecurity: Not on file   Transportation Needs: Not on file   Physical Activity: Not on file   Stress: Not on file   Social Connections: Not on file   Intimate Partner Violence: Not At Risk    Fear of Current or Ex-Partner: No    Emotionally Abused: No    Physically Abused: No    Sexually Abused: No   Housing Stability: Not on file     Outpatient Medications Marked as Taking for the 2/16/22 encounter (Office Visit) with Ioana Field MD   Medication    Calcium Carb-Cholecalciferol 600-800 MG-UNIT TABS    estradiol (ESTRACE) 0 1 mg/g vaginal cream    ibuprofen (MOTRIN) 200 mg tablet     Allergies   Allergen Reactions    Naproxen Anaphylaxis    Amphotericin B Rash    Ampicillin Rash    Penicillins Rash           Objective:  /68 (BP Location: Right arm, Patient Position: Sitting, Cuff Size: Large)   Ht 5' 2 5" (1 588 m)   Wt 83 9 kg (185 lb)   LMP  (LMP Unknown)   Breastfeeding No   BMI 33 30 kg/m²        Chaperone present? Yes: Sonny Zamora MA      General Appearance: alert and oriented, in no acute distress  Abdomen: Soft, non-tender, non-distended, no masses, no rebound or guarding  Pelvic:       External genitalia: Normal appearance, no abnormal pigmentation, no lesions or masses  Normal Bartholin's and [place]'s       Urinary system: Urethral meatus normal, bladder non-tender       Vaginal: Prolapse of posterior vaginal mucosa visible at hymenal ring prior to removal of pessary   No erosions, bleeding, or abnormal discharge noted  Pessary removed, cleaned, and replaced        Cervix: Normal-appearing, well-epithelialized, no gross lesions or masses  Extremities: Normal range of motion  Skin: normal, no rash or abnormalities  Neurologic: alert, oriented x3  Psychiatric: Appropriate affect, mood stable, cooperative with exam         Queenie Card MD  2/16/2022 10:45 AM   ---------------  Pessary    Date/Time: 2/16/2022 10:46 AM  Performed by: Florentin Tse MD  Authorized by: Florentin Tse MD   Universal Protocol:  Consent: Verbal consent obtained  Risks and benefits: risks, benefits and alternatives were discussed  Consent given by: patient  Patient understanding: patient states understanding of the procedure being performed  Patient consent: the patient's understanding of the procedure matches consent given  Required items: required blood products, implants, devices, and special equipment available  Patient identity confirmed: verbally with patient      Indication:     Indication for pessary: uterine prolapse    Pre-procedure:     Pessary procedure type:  Cleaning/check  Problems:     Pessary complications: none    Procedure:     Pessary type:  Ring w/ support    Pessary size:  5    Patient tolerance of procedure:   Tolerated well, no immediate complications    Queenie Card MD  2/16/2022 10:47 AM

## 2022-02-16 NOTE — ASSESSMENT & PLAN NOTE
- No evidence of erosion today   - Size 5 pessary removed, cleaned, and reinserted today  - Continue vaginal estrogen 2-3 times weekly  - Return for annual exam with pessary check in 2-3 months

## 2022-04-27 ENCOUNTER — OFFICE VISIT (OUTPATIENT)
Dept: OBGYN CLINIC | Facility: CLINIC | Age: 67
End: 2022-04-27
Payer: MEDICARE

## 2022-04-27 VITALS
BODY MASS INDEX: 32.78 KG/M2 | HEIGHT: 63 IN | SYSTOLIC BLOOD PRESSURE: 142 MMHG | WEIGHT: 185 LBS | DIASTOLIC BLOOD PRESSURE: 82 MMHG

## 2022-04-27 DIAGNOSIS — Z96.0 VAGINAL PESSARY IN SITU: ICD-10-CM

## 2022-04-27 DIAGNOSIS — N95.2 VAGINAL ATROPHY: ICD-10-CM

## 2022-04-27 DIAGNOSIS — N81.3 UTERINE PROCIDENTIA: Primary | ICD-10-CM

## 2022-04-27 DIAGNOSIS — Z12.31 ENCOUNTER FOR SCREENING MAMMOGRAM FOR MALIGNANT NEOPLASM OF BREAST: ICD-10-CM

## 2022-04-27 PROCEDURE — 99213 OFFICE O/P EST LOW 20 MIN: CPT | Performed by: STUDENT IN AN ORGANIZED HEALTH CARE EDUCATION/TRAINING PROGRAM

## 2022-04-27 NOTE — ASSESSMENT & PLAN NOTE
- No evidence of erosion today   - Size 5 pessary removed, cleaned, and reinserted today  - Continue vaginal estrogen weekly  - Return for pessary check 3 months

## 2022-04-27 NOTE — PROGRESS NOTES
[address], [address], [facility], [address]    ASSESSMENT/PLAN: Bob Lopez is a 74 y o  [id number] who presents for follow up visit for prolapse and vaginal atrophy  1  Uterine procidentia  Assessment & Plan:  - No evidence of erosion today   - Size 5 pessary removed, cleaned, and reinserted today  - Continue vaginal estrogen weekly  - Return for pessary check 3 months  2  Vaginal pessary in situ    3  Vaginal atrophy  Assessment & Plan:  - Continue vaginal estrogen  Refill can be provided upon patient request        4  Encounter for screening mammogram for malignant neoplasm of breast  -     Mammo screening bilateral w 3d & cad; Future    Additional problems addressed during this visit:  - Yearly comprehensive exam completed today  - Cervical Cancer Screening complete  History of abnormal: No  - STI screening offered including HIV testing: offered, pt declined  - Breast Cancer Screening: Last Mammogram 05/19/2021, repeat ordered  - Colorectal cancer screening was not ordered, as she reports that she is up to date  - Osteoporosis screening: normal DEXA last year  Will consider repeat next year  - The following were reviewed in today's visit: breast self exam, mammography screening ordered, exercise and healthy diet    CC:  Follow up for prolapse and vaginal atrophy  HPI: Bob Lopez is a 74 y o  [id number] who presents for follow up of pelvic prolapse, vaginal atrophy, and routine well care  She reports no discharge or bleeding  Satisfied with pessary  Adequately treating bulge symptoms  ROS: Negative except as noted in HPI    No LMP recorded (lmp unknown)  Patient is postmenopausal   Menstrual History  Period Duration (Days): Postmenopausal    She  reports previously being sexually active   She reports using the following method of birth control/protection: Post-menopausal   Sexual History  Sexual Assault: No  Sexually Transmitted Infection History: None    The following portions of the patient's history were reviewed and updated as appropriate:  Past Medical History:   Diagnosis Date    Obesity     Papanicolaou smear 01/27/2020    neg    Rectocele     Uterine prolapse 2020     Past Surgical History:   Procedure Laterality Date    DILATION AND EVACUATION  1980    x7    HERNIA REPAIR  2013    NEUROPLASTY / TRANSPOSITION MEDIAN NERVE AT CARPAL TUNNEL  2012     Family History   Problem Relation Age of Onset    Stroke Father     Heart disease Father     Diabetes Father     Hypertension Maternal Grandmother     Hypertension Paternal Grandmother     Breast cancer Neg Hx     Ovarian cancer Neg Hx     Colon cancer Neg Hx     Uterine cancer Neg Hx      Social History     Socioeconomic History    Marital status: /Civil Union     Spouse name: None    Number of children: 1    Years of education: None    Highest education level: None   Occupational History    None   Tobacco Use    Smoking status: Never Smoker    Smokeless tobacco: Never Used   Vaping Use    Vaping Use: Never used   Substance and Sexual Activity    Alcohol use:  Yes    Drug use: Never    Sexual activity: Not Currently     Birth control/protection: Post-menopausal   Other Topics Concern    None   Social History Narrative    Exercises occasionally     Social Determinants of Health     Financial Resource Strain: Not on file   Food Insecurity: Not on file   Transportation Needs: Not on file   Physical Activity: Not on file   Stress: Not on file   Social Connections: Not on file   Intimate Partner Violence: Not At Risk    Fear of Current or Ex-Partner: No    Emotionally Abused: No    Physically Abused: No    Sexually Abused: No   Housing Stability: Not on file     Outpatient Medications Marked as Taking for the 4/27/22 encounter (Office Visit) with Lawrence Torres MD   Medication    Calcium Carb-Cholecalciferol 600-800 MG-UNIT TABS    estradiol (ESTRACE) 0 1 mg/g vaginal cream    ibuprofen (MOTRIN) 200 mg tablet     Allergies   Allergen Reactions    Naproxen Anaphylaxis    Amphotericin B Rash    Ampicillin Rash    Penicillins Rash           Objective:  /82 (BP Location: Left arm, Patient Position: Sitting, Cuff Size: Standard)   Ht 5' 2 5" (1 588 m)   Wt 83 9 kg (185 lb)   LMP  (LMP Unknown)   BMI 33 30 kg/m²        Chaperone present? Yes: James Maradiaga MA  General Appearance: alert and oriented, in no acute distress  Neck/Thyroid: No thyromegaly, no thyroid nodules  Respiratory: Unlabored breathing  Cardiovascular: Regular rate, no peripheral edema  Abdomen: Soft, non-tender, non-distended, no masses, no rebound or guarding  Breast Exam: No dimpling, nipple retraction or discharge  No lumps or masses  No axillary or supraclavicular nodes  Pelvic:       External genitalia: Normal appearance, no abnormal pigmentation, no lesions or masses  Normal Bartholin's and [place]'s       Urinary system: Urethral meatus normal, bladder non-tender       Vaginal: Prolapse of posterior vaginal mucosa visible at hymenal ring prior to removal of pessary  No erosions, bleeding, or abnormal discharge noted  Pessary removed, cleaned, and replaced        Cervix: Normal-appearing, well-epithelialized, no gross lesions or masses  Extremities: Normal range of motion  Skin: normal, no rash or abnormalities  Neurologic: alert, oriented x3  Psychiatric: Appropriate affect, mood stable, cooperative with exam   Extremities: Normal range of motion  Warm, well-perfused, non-tender  Skin: normal, no rash or abnormalities  Neurologic: alert, oriented x3  Psychiatric: Appropriate affect, mood stable, cooperative with exam     She Cannon MD  4/27/2022 11:22 AM   -------------------  Pessary    Date/Time: 4/27/2022 11:25 AM  Performed by: Chris Ortiz MD  Authorized by: Chris Ortiz MD   Universal Protocol:  Consent: Verbal consent obtained    Risks and benefits: risks, benefits and alternatives were discussed  Time out: Immediately prior to procedure a "time out" was called to verify the correct patient, procedure, equipment, support staff and site/side marked as required  Patient understanding: patient states understanding of the procedure being performed  Patient consent: the patient's understanding of the procedure matches consent given  Required items: required blood products, implants, devices, and special equipment available  Patient identity confirmed: verbally with patient      Indication:     Indication for pessary: uterine prolapse, cystocele and rectocele    Pre-procedure:     Pessary procedure type:  Cleaning/check  Problems:     Pessary complications: none    Procedure:     Pessary type:  Ring w/ support    Pessary size:  5    Patient tolerance of procedure:   Tolerated well, no immediate complications

## 2022-06-16 DIAGNOSIS — Z12.31 ENCOUNTER FOR SCREENING MAMMOGRAM FOR MALIGNANT NEOPLASM OF BREAST: ICD-10-CM

## 2022-07-29 ENCOUNTER — OFFICE VISIT (OUTPATIENT)
Dept: OBGYN CLINIC | Facility: CLINIC | Age: 67
End: 2022-07-29
Payer: MEDICARE

## 2022-07-29 VITALS
WEIGHT: 191 LBS | HEIGHT: 63 IN | SYSTOLIC BLOOD PRESSURE: 130 MMHG | DIASTOLIC BLOOD PRESSURE: 79 MMHG | BODY MASS INDEX: 33.84 KG/M2

## 2022-07-29 DIAGNOSIS — N95.2 VAGINAL ATROPHY: ICD-10-CM

## 2022-07-29 DIAGNOSIS — N81.3 UTERINE PROCIDENTIA: Primary | ICD-10-CM

## 2022-07-29 PROCEDURE — 99213 OFFICE O/P EST LOW 20 MIN: CPT | Performed by: STUDENT IN AN ORGANIZED HEALTH CARE EDUCATION/TRAINING PROGRAM

## 2022-07-29 NOTE — PROGRESS NOTES
[address] [facility], [address], [facility], [address]    Assessment/Plan:  1  Uterine procidentia  Assessment & Plan:  - No evidence of erosion today   - Size 5 pessary removed, cleaned, and reinserted today  - Continue vaginal estrogen weekly  - Return for pessary check 3 months  Orders:  -     Pessary    2  Vaginal atrophy  Assessment & Plan:  - Continue vaginal estrogen  Refill can be provided upon patient request          Subjective:   Darline Toney is a 74 y o  [id number]   CC:   Chief Complaint   Patient presents with    Follow-up     Pessary check  HPI: Patient presents for routine pessary maintenance  She is without complaint today  Denies vaginal discharge, irritation, bleeding, or bulge symptoms  ROS: Negative except as noted in HPI    No LMP recorded (lmp unknown)  Patient is postmenopausal        She  reports previously being sexually active   She reports using the following method of birth control/protection: Post-menopausal        The following portions of the patient's history were reviewed and updated as appropriate:   Past Medical History:   Diagnosis Date    Obesity     Papanicolaou smear 01/27/2020    neg    Rectocele     Uterine prolapse 2020     Past Surgical History:   Procedure Laterality Date    DILATION AND EVACUATION  1980    x7    HERNIA REPAIR  2013    NEUROPLASTY / TRANSPOSITION MEDIAN NERVE AT CARPAL TUNNEL  2012     Family History   Problem Relation Age of Onset    Stroke Father     Heart disease Father     Diabetes Father     Hypertension Maternal Grandmother     Hypertension Paternal Grandmother     Breast cancer Neg Hx     Ovarian cancer Neg Hx     Colon cancer Neg Hx     Uterine cancer Neg Hx      Social History     Socioeconomic History    Marital status: /Civil Union     Spouse name: Not on file    Number of children: 1    Years of education: Not on file    Highest education level: Not on file   Occupational History    Not on file   Tobacco Use    Smoking status: Never Smoker    Smokeless tobacco: Never Used   Vaping Use    Vaping Use: Never used   Substance and Sexual Activity    Alcohol use: Yes    Drug use: Never    Sexual activity: Not Currently     Birth control/protection: Post-menopausal   Other Topics Concern    Not on file   Social History Narrative    Exercises occasionally     Social Determinants of Health     Financial Resource Strain: Not on file   Food Insecurity: Not on file   Transportation Needs: Not on file   Physical Activity: Not on file   Stress: Not on file   Social Connections: Not on file   Intimate Partner Violence: Not At Risk    Fear of Current or Ex-Partner: No    Emotionally Abused: No    Physically Abused: No    Sexually Abused: No   Housing Stability: Not on file     Outpatient Medications Marked as Taking for the 7/29/22 encounter (Office Visit) with Ashley Miller MD   Medication    Calcium Carb-Cholecalciferol 600-800 MG-UNIT TABS    estradiol (ESTRACE) 0 1 mg/g vaginal cream    ibuprofen (MOTRIN) 200 mg tablet     Allergies   Allergen Reactions    Naproxen Anaphylaxis    Amphotericin B Rash    Ampicillin Rash    Penicillins Rash           Objective:  /79 (BP Location: Left arm, Patient Position: Sitting, Cuff Size: Standard)   Ht 5' 2 5" (1 588 m)   Wt 86 6 kg (191 lb)   LMP  (LMP Unknown)   BMI 34 38 kg/m²        Chaperone present? Yes: Tressa Camacho MA  General Appearance: alert and oriented, in no acute distress  Abdomen: Soft, non-tender, non-distended, no masses, no rebound or guarding  Breast Exam: No dimpling, nipple retraction or discharge  No lumps or masses  No axillary or supraclavicular nodes  Pelvic:       External genitalia: Normal appearance, no abnormal pigmentation, no lesions or masses   Normal Bartholin's and [place]'s       Urinary system: Urethral meatus normal, bladder non-tender       Vaginal: Prolapse of posterior vaginal mucosa visible at hymenal ring prior to removal of pessary  No erosions, bleeding, or abnormal discharge noted  Pessary removed, cleaned, and replaced        Cervix: Normal-appearing, well-epithelialized, no gross lesions or masses  Extremities: Normal range of motion  Skin: normal, no rash or abnormalities  Neurologic: alert, oriented x3  Psychiatric: Appropriate affect, mood stable, cooperative with exam         Elsworth Curling, MD  7/29/2022 9:38 AM   ----------------------------------------  Pessary    Date/Time: 7/29/2022 9:37 AM  Performed by: Isidra Valadez MD  Authorized by: Isidra Valadez MD   Universal Protocol:  Consent: Verbal consent obtained  Risks and benefits: risks, benefits and alternatives were discussed  Consent given by: patient  Time out: Immediately prior to procedure a "time out" was called to verify the correct patient, procedure, equipment, support staff and site/side marked as required  Patient understanding: patient states understanding of the procedure being performed  Patient consent: the patient's understanding of the procedure matches consent given  Procedure consent: procedure consent matches procedure scheduled  Patient identity confirmed: verbally with patient      Indication:     Indication for pessary: uterine prolapse and rectocele    Pre-procedure:     Pessary procedure type:  Cleaning/check  Problems:     Pessary complications: none    Procedure:     Pessary type:  Ring w/ support    Pessary size:  5    Patient tolerance of procedure:   Tolerated well, no immediate complications

## 2022-10-28 ENCOUNTER — OFFICE VISIT (OUTPATIENT)
Dept: OBGYN CLINIC | Facility: CLINIC | Age: 67
End: 2022-10-28

## 2022-10-28 VITALS
SYSTOLIC BLOOD PRESSURE: 128 MMHG | WEIGHT: 191.4 LBS | DIASTOLIC BLOOD PRESSURE: 78 MMHG | BODY MASS INDEX: 33.91 KG/M2 | HEIGHT: 63 IN

## 2022-10-28 DIAGNOSIS — N95.2 VAGINAL ATROPHY: ICD-10-CM

## 2022-10-28 DIAGNOSIS — N81.3 UTERINE PROCIDENTIA: Primary | ICD-10-CM

## 2022-10-28 RX ORDER — ESTRADIOL 0.1 MG/G
1 CREAM VAGINAL 2 TIMES WEEKLY
Qty: 42.5 G | Refills: 1 | Status: SHIPPED | OUTPATIENT
Start: 2022-10-31

## 2022-10-28 NOTE — PROGRESS NOTES
[address] [facility], [address], [facility], [address]    Assessment/Plan:  1  Uterine procidentia  Assessment & Plan:  - No evidence of erosion today   - Size 5 pessary removed, cleaned, and reinserted today  - Continue vaginal estrogen weekly  - Return for pessary check 3 months  2  Vaginal atrophy  Assessment & Plan:  - Continue vaginal estrogen  Refill sent today, per patient request      Orders:  -     estradiol (ESTRACE) 0 1 mg/g vaginal cream; Insert 1 g into the vagina 2 (two) times a week Daily x 2 weeks, then 2-3 times weekly  Use lowest frequency necessary to alleviate symptoms    Pessary    Date/Time: 10/28/2022 9:44 AM  Performed by: Hilton Stewart MD  Authorized by: Hilton Stewart MD   Universal Protocol:  Consent: Verbal consent obtained  Risks and benefits: risks, benefits and alternatives were discussed  Consent given by: patient  Time out: Immediately prior to procedure a "time out" was called to verify the correct patient, procedure, equipment, support staff and site/side marked as required  Patient understanding: patient states understanding of the procedure being performed  Patient consent: the patient's understanding of the procedure matches consent given  Procedure consent: procedure consent matches procedure scheduled  Required items: required blood products, implants, devices, and special equipment available  Patient identity confirmed: verbally with patient      Indication:     Indication for pessary: uterine prolapse and rectocele    Pre-procedure:     Pessary procedure type:  Cleaning/check  Problems:     Pessary complications: none    Procedure:     Pessary type:  Ring w/ support    Pessary size:  Size 5    Patient tolerance of procedure: Tolerated well, no immediate complications          Subjective:   Mal Perea is a 74 y o  [id number]     CC:   Chief Complaint   Patient presents with   • Gynecologic Exam     Pt state here for her pessary cleaning and check        HPI: She is without complaint today  Denies vaginal discharge or bleeding  Mild bulge symptoms on days when she is very active, but overall satisfied  ROS: Negative except as noted in HPI    No LMP recorded (lmp unknown)  Patient is postmenopausal        She  reports previously being sexually active  She reports using the following method of birth control/protection: Post-menopausal        The following portions of the patient's history were reviewed and updated as appropriate:   Past Medical History:   Diagnosis Date   • Obesity    • Papanicolaou smear 01/27/2020    neg   • Rectocele    • Uterine prolapse 2020     Past Surgical History:   Procedure Laterality Date   • DILATION AND EVACUATION  1980    x7   • HERNIA REPAIR  2013   • NEUROPLASTY / TRANSPOSITION MEDIAN NERVE AT CARPAL TUNNEL  2012     Family History   Problem Relation Age of Onset   • Stroke Father    • Heart disease Father    • Diabetes Father    • Hypertension Maternal Grandmother    • Hypertension Paternal Grandmother    • Breast cancer Neg Hx    • Ovarian cancer Neg Hx    • Colon cancer Neg Hx    • Uterine cancer Neg Hx      Social History     Socioeconomic History   • Marital status: /Civil Union     Spouse name: Not on file   • Number of children: 1   • Years of education: Not on file   • Highest education level: Not on file   Occupational History   • Not on file   Tobacco Use   • Smoking status: Never Smoker   • Smokeless tobacco: Never Used   Vaping Use   • Vaping Use: Never used   Substance and Sexual Activity   • Alcohol use:  Yes   • Drug use: Never   • Sexual activity: Not Currently     Birth control/protection: Post-menopausal   Other Topics Concern   • Not on file   Social History Narrative    Exercises occasionally     Social Determinants of Health     Financial Resource Strain: Not on file   Food Insecurity: Not on file   Transportation Needs: Not on file   Physical Activity: Not on file   Stress: Not on file Social Connections: Not on file   Intimate Partner Violence: Not At Risk   • Fear of Current or Ex-Partner: No   • Emotionally Abused: No   • Physically Abused: No   • Sexually Abused: No   Housing Stability: Not on file     Outpatient Medications Marked as Taking for the 10/28/22 encounter (Office Visit) with Claus Miranda MD   Medication   • Calcium Carb-Cholecalciferol 600-800 MG-UNIT TABS   • [START ON 10/31/2022] estradiol (ESTRACE) 0 1 mg/g vaginal cream   • ibuprofen (MOTRIN) 200 mg tablet   • [DISCONTINUED] estradiol (ESTRACE) 0 1 mg/g vaginal cream     Allergies   Allergen Reactions   • Naproxen Anaphylaxis   • Amphotericin B Rash   • Ampicillin Rash   • Penicillins Rash           Objective:  /78 (BP Location: Left arm, Patient Position: Sitting, Cuff Size: Standard)   Ht 5' 2 5" (1 588 m)   Wt 86 8 kg (191 lb 6 4 oz)   LMP  (LMP Unknown)   BMI 34 45 kg/m²        Chaperone present? Yes: Tiff Fisher MA  General Appearance: alert and oriented, in no acute distress  Abdomen: Soft, non-tender, non-distended, no masses, no rebound or guarding  Pelvic:       External genitalia: Normal appearance, no abnormal pigmentation, no lesions or masses  Normal Bartholin's and [place]'s       Urinary system: Urethral meatus normal, bladder non-tender       Vaginal: Prolapse of posterior vaginal mucosa visible at hymenal ring prior to removal of pessary  No erosions, bleeding, or abnormal discharge noted  Pessary removed, cleaned, and replaced        Cervix: Normal-appearing, well-epithelialized, no gross lesions or massesExtremities: Normal range of motion     Skin: normal, no rash or abnormalities  Neurologic: alert, oriented x3  Psychiatric: Appropriate affect, mood stable, cooperative with exam         Karene Res  10/28/2022 9:45 AM

## 2022-11-11 ENCOUNTER — RA CDI HCC (OUTPATIENT)
Dept: OTHER | Facility: HOSPITAL | Age: 67
End: 2022-11-11

## 2022-11-11 NOTE — PROGRESS NOTES
[address]  coding opportunities       Chart reviewed, no opportunity found: CHART REVIEWED, NO OPPORTUNITY FOUND        Patients Insurance     Medicare Insurance: Medicare

## 2022-11-18 ENCOUNTER — OFFICE VISIT (OUTPATIENT)
Dept: FAMILY MEDICINE CLINIC | Facility: CLINIC | Age: 67
End: 2022-11-18

## 2022-11-18 VITALS
OXYGEN SATURATION: 100 % | WEIGHT: 189.8 LBS | RESPIRATION RATE: 16 BRPM | SYSTOLIC BLOOD PRESSURE: 132 MMHG | HEART RATE: 94 BPM | BODY MASS INDEX: 33.63 KG/M2 | TEMPERATURE: 97.6 F | HEIGHT: 63 IN | DIASTOLIC BLOOD PRESSURE: 88 MMHG

## 2022-11-18 DIAGNOSIS — Z13.1 SCREENING FOR DIABETES MELLITUS: ICD-10-CM

## 2022-11-18 DIAGNOSIS — R01.1 MURMUR: ICD-10-CM

## 2022-11-18 DIAGNOSIS — Z00.00 MEDICARE ANNUAL WELLNESS VISIT, SUBSEQUENT: Primary | ICD-10-CM

## 2022-11-18 DIAGNOSIS — Z13.6 SCREENING FOR CARDIOVASCULAR CONDITION: ICD-10-CM

## 2022-11-18 DIAGNOSIS — R00.2 PALPITATIONS: ICD-10-CM

## 2022-11-18 DIAGNOSIS — E66.9 OBESITY (BMI 30.0-34.9): ICD-10-CM

## 2022-11-18 DIAGNOSIS — Z23 IMMUNIZATION DUE: ICD-10-CM

## 2022-11-18 NOTE — PATIENT INSTRUCTIONS
Medicare Preventive Visit Patient Instructions  Thank you for completing your Welcome to Medicare Visit or Medicare Annual Wellness Visit today  Your next wellness visit will be due in one year (11/19/2023)  The screening/preventive services that you may require over the next 5-10 years are detailed below  Some tests may not apply to you based off risk factors and/or age  Screening tests ordered at today's visit but not completed yet may show as past due  Also, please note that scanned in results may not display below  Preventive Screenings:  Service Recommendations Previous Testing/Comments   Colorectal Cancer Screening  * Colonoscopy    * Fecal Occult Blood Test (FOBT)/Fecal Immunochemical Test (FIT)  * Fecal DNA/Cologuard Test  * Flexible Sigmoidoscopy Age: 39-70 years old   Colonoscopy: every 10 years (may be performed more frequently if at higher risk)  OR  FOBT/FIT: every 1 year  OR  Cologuard: every 3 years  OR  Sigmoidoscopy: every 5 years  Screening may be recommended earlier than age 39 if at higher risk for colorectal cancer  Also, an individualized decision between you and your healthcare provider will decide whether screening between the ages of 74-80 would be appropriate  Colonoscopy: 01/15/2013  FOBT/FIT: Not on file  Cologuard: Not on file  Sigmoidoscopy: Not on file          Breast Cancer Screening Age: 36 years old  Frequency: every 1-2 years  Not required if history of left and right mastectomy Mammogram: 06/15/2022        Cervical Cancer Screening Between the ages of 21-29, pap smear recommended once every 3 years  Between the ages of 33-67, can perform pap smear with HPV co-testing every 5 years     Recommendations may differ for women with a history of total hysterectomy, cervical cancer, or abnormal pap smears in past  Pap Smear: 04/19/2021        Hepatitis C Screening Once for adults born between 1945 and 1965  More frequently in patients at high risk for Hepatitis C Hep C Antibody: 11/17/2020        Diabetes Screening 1-2 times per year if you're at risk for diabetes or have pre-diabetes Fasting glucose: No results in last 5 years (No results in last 5 years)  A1C: No results in last 5 years (No results in last 5 years)      Cholesterol Screening Once every 5 years if you don't have a lipid disorder  May order more often based on risk factors  Lipid panel: 12/21/2021          Other Preventive Screenings Covered by Medicare:  1  Abdominal Aortic Aneurysm (AAA) Screening: covered once if your at risk  You're considered to be at risk if you have a family history of AAA  2  Lung Cancer Screening: covers low dose CT scan once per year if you meet all of the following conditions: (1) Age 50-69; (2) No signs or symptoms of lung cancer; (3) Current smoker or have quit smoking within the last 15 years; (4) You have a tobacco smoking history of at least 20 pack years (packs per day multiplied by number of years you smoked); (5) You get a written order from a healthcare provider  3  Glaucoma Screening: covered annually if you're considered high risk: (1) You have diabetes OR (2) Family history of glaucoma OR (3)  aged 48 and older OR (3)  American aged 72 and older  3  Osteoporosis Screening: covered every 2 years if you meet one of the following conditions: (1) You're estrogen deficient and at risk for osteoporosis based off medical history and other findings; (2) Have a vertebral abnormality; (3) On glucocorticoid therapy for more than 3 months; (4) Have primary hyperparathyroidism; (5) On osteoporosis medications and need to assess response to drug therapy  · Last bone density test (DXA Scan): 05/19/2021   5  HIV Screening: covered annually if you're between the age of 15-65  Also covered annually if you are younger than 13 and older than 72 with risk factors for HIV infection  For pregnant patients, it is covered up to 3 times per pregnancy      Immunizations:  Immunization Recommendations   Influenza Vaccine Annual influenza vaccination during flu season is recommended for all persons aged >= 6 months who do not have contraindications   Pneumococcal Vaccine   * Pneumococcal conjugate vaccine = PCV13 (Prevnar 13), PCV15 (Vaxneuvance), PCV20 (Prevnar 20)  * Pneumococcal polysaccharide vaccine = PPSV23 (Pneumovax) Adults 25-60 years old: 1-3 doses may be recommended based on certain risk factors  Adults 72 years old: 1-2 doses may be recommended based off what pneumonia vaccine you previously received   Hepatitis B Vaccine 3 dose series if at intermediate or high risk (ex: diabetes, end stage renal disease, liver disease)   Tetanus (Td) Vaccine - COST NOT COVERED BY MEDICARE PART B Following completion of primary series, a booster dose should be given every 10 years to maintain immunity against tetanus  Td may also be given as tetanus wound prophylaxis  Tdap Vaccine - COST NOT COVERED BY MEDICARE PART B Recommended at least once for all adults  For pregnant patients, recommended with each pregnancy  Shingles Vaccine (Shingrix) - COST NOT COVERED BY MEDICARE PART B  2 shot series recommended in those aged 48 and above     Health Maintenance Due:      Topic Date Due   • Colorectal Cancer Screening  01/15/2023   • Cervical Cancer Screening  01/27/2023   • Breast Cancer Screening: Mammogram  06/15/2023   • DXA SCAN  05/19/2026   • Hepatitis C Screening  Completed     Immunizations Due:      Topic Date Due   • Influenza Vaccine (1) 09/01/2022     Advance Directives   What are advance directives? Advance directives are legal documents that state your wishes and plans for medical care  These plans are made ahead of time in case you lose your ability to make decisions for yourself  Advance directives can apply to any medical decision, such as the treatments you want, and if you want to donate organs  What are the types of advance directives?   There are many types of advance directives, and each state has rules about how to use them  You may choose a combination of any of the following:  · Living will: This is a written record of the treatment you want  You can also choose which treatments you do not want, which to limit, and which to stop at a certain time  This includes surgery, medicine, IV fluid, and tube feedings  · Durable power of  for healthcare [facility]): This is a written record that states who you want to make healthcare choices for you when you are unable to make them for yourself  This person, called a proxy, is usually a family member or a friend  You may choose more than 1 proxy  · Do not resuscitate (DNR) order:  A DNR order is used in case your heart stops beating or you stop breathing  It is a request not to have certain forms of treatment, such as CPR  A DNR order may be included in other types of advance directives  · Medical directive: This covers the care that you want if you are in a coma, near death, or unable to make decisions for yourself  You can list the treatments you want for each condition  Treatment may include pain medicine, surgery, blood transfusions, dialysis, IV or tube feedings, and a ventilator (breathing machine)  · Values history: This document has questions about your views, beliefs, and how you feel and think about life  This information can help others choose the care that you would choose  Why are advance directives important? An advance directive helps you control your care  Although spoken wishes may be used, it is better to have your wishes written down  Spoken wishes can be misunderstood, or not followed  Treatments may be given even if you do not want them  An advance directive may make it easier for your family to make difficult choices about your care     Weight Management   Why it is important to manage your weight:  Being overweight increases your risk of health conditions such as heart disease, high blood pressure, type 2 diabetes, and certain types of cancer  It can also increase your risk for osteoarthritis, sleep apnea, and other respiratory problems  Aim for a slow, steady weight loss  Even a small amount of weight loss can lower your risk of health problems  How to lose weight safely:  A safe and healthy way to lose weight is to eat fewer calories and get regular exercise  You can lose up about 1 pound a week by decreasing the number of calories you eat by 500 calories each day  Healthy meal plan for weight management:  A healthy meal plan includes a variety of foods, contains fewer calories, and helps you stay healthy  A healthy meal plan includes the following:  · Eat whole-grain foods more often  A healthy meal plan should contain fiber  Fiber is the part of grains, fruits, and vegetables that is not broken down by your body  Whole-grain foods are healthy and provide extra fiber in your diet  Some examples of whole-grain foods are whole-wheat breads and pastas, oatmeal, brown rice, and bulgur  · Eat a variety of vegetables every day  Include dark, leafy greens such as spinach, kale, qi greens, and mustard greens  Eat yellow and orange vegetables such as carrots, sweet potatoes, and winter squash  · Eat a variety of fruits every day  Choose fresh or canned fruit (canned in its own juice or light syrup) instead of juice  Fruit juice has very little or no fiber  · Eat low-fat dairy foods  Drink fat-free (skim) milk or 1% milk  Eat fat-free yogurt and low-fat cottage cheese  Try low-fat cheeses such as mozzarella and other reduced-fat cheeses  · Choose meat and other protein foods that are low in fat  Choose beans or other legumes such as split peas or lentils  Choose fish, skinless poultry (chicken or turkey), or lean cuts of red meat (beef or pork)  Before you cook meat or poultry, cut off any visible fat  · Use less fat and oil  Try baking foods instead of frying them   Add less fat, such as margarine, sour cream, regular salad dressing and mayonnaise to foods  Eat fewer high-fat foods  Some examples of high-fat foods include french fries, doughnuts, ice cream, and cakes  · Eat fewer sweets  Limit foods and drinks that are high in sugar  This includes candy, cookies, regular soda, and sweetened drinks  Exercise:  Exercise at least 30 minutes per day on most days of the week  Some examples of exercise include walking, biking, dancing, and swimming  You can also fit in more physical activity by taking the stairs instead of the elevator or parking farther away from stores  Ask your healthcare provider about the best exercise plan for you  © Copyright [address] 2018 Information is for End User's use only and may not be sold, redistributed or otherwise used for commercial purposes   All illustrations and images included in CareNotes® are the copyrighted property of A D A M , Inc  or [address] [place] [address]

## 2022-11-18 NOTE — PROGRESS NOTES
Assessment and Plan:     Problem List Items Addressed This Visit        Other    Obesity (BMI 30 0-34  9)     BMI Counseling: Body mass index is 34 16 kg/m²  The BMI is above normal  Nutrition recommendations include reducing portion sizes, decreasing overall calorie intake and 3-5 servings of fruits/vegetables daily  Exercise recommendations include vigorous aerobic physical activity for 75 minutes/week  Relevant Orders    Lipid panel    Comprehensive metabolic panel    CBC and differential    TSH, 3rd generation with Free T4 reflex    UA (URINE) with reflex to Scope    Palpitations    Relevant Orders    Holter monitor    Lipid panel    Comprehensive metabolic panel    CBC and differential    TSH, 3rd generation with Free T4 reflex    UA (URINE) with reflex to Scope    Echo complete w/ contrast if indicated   Other Visit Diagnoses     Medicare annual wellness visit, subsequent    -  Primary    Relevant Orders    Lipid panel    Comprehensive metabolic panel    Immunization due        Relevant Orders    influenza vaccine, high-dose, PF 0 7 mL (FLUZONE HIGH-DOSE) (Completed)    Screening for diabetes mellitus        Relevant Orders    Lipid panel    Comprehensive metabolic panel    Screening for cardiovascular condition        Relevant Orders    Lipid panel    Comprehensive metabolic panel    Murmur        Relevant Orders    Echo complete w/ contrast if indicated           Preventive health issues were discussed with patient, and age appropriate screening tests were ordered as noted in patient's After Visit Summary  Personalized health advice and appropriate referrals for health education or preventive services given if needed, as noted in patient's After Visit Summary       History of Present Illness:     Patient presents for a Medicare Wellness Visit    HPI   Patient Care Team:  Annie Larsen DO as PCP - General (Family Medicine)     Review of Systems:     Review of Systems   Constitutional: Negative for chills, fatigue and fever  HENT: Negative for congestion, postnasal drip, rhinorrhea and sinus pressure  Eyes: Negative for photophobia and visual disturbance  Respiratory: Negative for cough and shortness of breath  Cardiovascular: Positive for palpitations  Negative for chest pain and leg swelling  Gastrointestinal: Negative for abdominal pain, constipation, diarrhea, nausea and vomiting  Genitourinary: Negative for difficulty urinating and dysuria  Musculoskeletal: Negative for arthralgias and myalgias  Skin: Negative for color change and rash  Neurological: Negative for dizziness, weakness, light-headedness and headaches  Problem List:     Patient Active Problem List   Diagnosis   • Allergic rhinitis   • Diverticulosis   • Internal hemorrhoids   • Vaginal atrophy   • Uterine procidentia   • Vaginal pessary in situ   • Obesity (BMI 30 0-34  9)   • Palpitations      Past Medical and Surgical History:     Past Medical History:   Diagnosis Date   • Obesity    • Papanicolaou smear 01/27/2020    neg   • Rectocele    • Uterine prolapse 2020     Past Surgical History:   Procedure Laterality Date   • DILATION AND EVACUATION  1980    x7   • HERNIA REPAIR  2013   • NEUROPLASTY / TRANSPOSITION MEDIAN NERVE AT CARPAL TUNNEL  2012      Family History:     Family History   Problem Relation Age of Onset   • Stroke Father    • Heart disease Father    • Diabetes Father    • Hypertension Maternal Grandmother    • Hypertension Paternal Grandmother    • Breast cancer Neg Hx    • Ovarian cancer Neg Hx    • Colon cancer Neg Hx    • Uterine cancer Neg Hx       Social History:     Social History     Socioeconomic History   • Marital status: /Civil Union     Spouse name: None   • Number of children: 1   • Years of education: None   • Highest education level: None   Occupational History   • None   Tobacco Use   • Smoking status: Never   • Smokeless tobacco: Never   Vaping Use   • Vaping Use: Never used Substance and Sexual Activity   • Alcohol use: Yes   • Drug use: Never   • Sexual activity: Not Currently     Birth control/protection: Post-menopausal   Other Topics Concern   • None   Social History Narrative    Exercises occasionally     Social Determinants of Health     Financial Resource Strain: Low Risk    • Difficulty of Paying Living Expenses: Not hard at all   Food Insecurity: Not on file   Transportation Needs: No Transportation Needs   • Lack of Transportation (Medical): No   • Lack of Transportation (Non-Medical): No   Physical Activity: Not on file   Stress: Not on file   Social Connections: Not on file   Intimate Partner Violence: Not on file   Housing Stability: Not on file      Medications and Allergies:     Current Outpatient Medications   Medication Sig Dispense Refill   • Calcium Carb-Cholecalciferol 600-800 MG-UNIT TABS Take 1 tablet by mouth 2 (two) times a day     • estradiol (ESTRACE) 0 1 mg/g vaginal cream Insert 1 g into the vagina 2 (two) times a week Daily x 2 weeks, then 2-3 times weekly  Use lowest frequency necessary to alleviate symptoms 42 5 g 1   • ibuprofen (MOTRIN) 200 mg tablet Take 200 mg by mouth every 6 (six) hours as needed for mild pain       No current facility-administered medications for this visit       Allergies   Allergen Reactions   • Naproxen Anaphylaxis   • Amphotericin B Rash   • Ampicillin Rash   • Penicillins Rash      Immunizations:     Immunization History   Administered Date(s) Administered   • COVID-19 PFIZER VACCINE 0 3 ML IM 03/18/2021, 04/12/2021   • COVID-19 Pfizer vac (Conrad-sucrose, gray cap) 12 yr+ IM 08/19/2022   • INFLUENZA 10/19/2007, 10/31/2008   • Influenza, high dose seasonal 0 7 mL 11/05/2020, 11/16/2021, 11/18/2022   • Influenza, seasonal, injectable 10/27/2014   • Pneumococcal Conjugate 13-Valent 11/05/2020   • Pneumococcal Polysaccharide PPV23 10/27/2014, 11/16/2021   • Tdap 05/27/2009, 12/03/2020      Health Maintenance:         Topic Date Due   • Colorectal Cancer Screening  01/15/2023   • Cervical Cancer Screening  01/27/2023   • Breast Cancer Screening: Mammogram  06/15/2023   • DXA SCAN  05/19/2026   • Hepatitis C Screening  Completed     There are no preventive care reminders to display for this patient  Medicare Screening Tests and Risk Assessments:     Desiree Medellin is here for her Subsequent Wellness visit  Last Medicare Wellness visit information reviewed, patient interviewed, no change since last AWV  Health Risk Assessment:   Patient rates overall health as good  Patient feels that their physical health rating is same  Patient is satisfied with their life  Eyesight was rated as same  Hearing was rated as same  Patient feels that their emotional and mental health rating is same  Patients states they are never, rarely angry  Patient states they are never, rarely unusually tired/fatigued  Pain experienced in the last 7 days has been none  Patient states that she has experienced no weight loss or gain in last 6 months  Depression Screening:   PHQ-2 Score: 0      Fall Risk Screening: In the past year, patient has experienced: no history of falling in past year      Urinary Incontinence Screening:   Patient has not leaked urine accidently in the last six months  Home Safety:  Patient does not have trouble with stairs inside or outside of their home  Patient has working smoke alarms and has working carbon monoxide detector  Home safety hazards include: none  Nutrition:   Current diet is Regular  Medications:   Patient is not currently taking any over-the-counter supplements  Patient is able to manage medications  Activities of Daily Living (ADLs)/Instrumental Activities of Daily Living (IADLs):   Walk and transfer into and out of bed and chair?: Yes  Dress and groom yourself?: Yes    Bathe or shower yourself?: Yes    Feed yourself?  Yes  Do your laundry/housekeeping?: Yes  Manage your money, pay your bills and track your expenses?: Yes  Make your own meals?: Yes    Do your own shopping?: Yes    Previous Hospitalizations:   Any hospitalizations or ED visits within the last 12 months?: No      Advance Care Planning:   Living will: Yes    Advanced directive: Yes    Advanced directive counseling given: Yes      Cognitive Screening:   Provider or family/friend/caregiver concerned regarding cognition?: No    PREVENTIVE SCREENINGS      Cardiovascular Screening:    General: Screening Current and Risks and Benefits Discussed    Due for: Lipid Panel      Diabetes Screening:     General: Screening Current and Risks and Benefits Discussed    Due for: Blood Glucose      Colorectal Cancer Screening:     General: Screening Current and Risks and Benefits Discussed      Breast Cancer Screening:     General: Screening Current and Risks and Benefits Discussed      Cervical Cancer Screening:    General: Screening Not Indicated and Risks and Benefits Discussed      Osteoporosis Screening:    General: Screening Current and Risks and Benefits Discussed      Lung Cancer Screening:     General: Screening Not Indicated      Hepatitis C Screening:    General: Screening Current    Screening, Brief Intervention, and Referral to Treatment (SBIRT)    Screening  Typical number of drinks in a day: 0  Typical number of drinks in a week: 0  Interpretation: Low risk drinking behavior      Single Item Drug Screening:  How often have you used an illegal drug (including marijuana) or a prescription medication for non-medical reasons in the past year? never    Single Item Drug Screen Score: 0  Interpretation: Negative screen for possible drug use disorder    Vision Screening    Right eye Left eye Both eyes   Without correction      With correction   20/20        Physical Exam:     /88 (BP Location: Right arm, Patient Position: Sitting, Cuff Size: Large)   Pulse 94   Temp 97 6 °F (36 4 °C)   Resp 16   Ht 5' 2 5" (1 588 m)   Wt 86 1 kg (189 lb 12 8 oz)   LMP (LMP Unknown)   SpO2 100%   BMI 34 16 kg/m²     Physical Exam  Constitutional:       General: She is not in acute distress  Appearance: Normal appearance  She is not ill-appearing, toxic-appearing or diaphoretic  HENT:      Head: Normocephalic and atraumatic  Right Ear: Tympanic membrane and ear canal normal       Left Ear: Tympanic membrane and ear canal normal       Nose: Nose normal  No congestion  Mouth/Throat:      Mouth: Mucous membranes are moist       Pharynx: Oropharynx is clear  No oropharyngeal exudate  Eyes:      Extraocular Movements: Extraocular movements intact  Conjunctiva/sclera: Conjunctivae normal       Pupils: Pupils are equal, round, and reactive to light  Cardiovascular:      Rate and Rhythm: Normal rate and regular rhythm  Pulses: Normal pulses  Heart sounds: Murmur heard  Systolic murmur is present  Pulmonary:      Effort: Pulmonary effort is normal       Breath sounds: Normal breath sounds  No wheezing, rhonchi or rales  Abdominal:      General: Bowel sounds are normal  There is no distension  Palpations: Abdomen is soft  Tenderness: There is no abdominal tenderness  Musculoskeletal:         General: No swelling or tenderness  Normal range of motion  Cervical back: Normal range of motion and neck supple  Skin:     General: Skin is warm and dry  Capillary Refill: Capillary refill takes less than 2 seconds  Neurological:      General: No focal deficit present  Mental Status: She is alert and oriented to person, place, and time  Cranial Nerves: No cranial nerve deficit  Psychiatric:         Mood and Affect: Mood normal          Behavior: Behavior normal          Thought Content:  Thought content normal           Annie Larsen,

## 2022-11-18 NOTE — ASSESSMENT & PLAN NOTE
BMI Counseling: Body mass index is 34 16 kg/m²  The BMI is above normal  Nutrition recommendations include reducing portion sizes, decreasing overall calorie intake and 3-5 servings of fruits/vegetables daily  Exercise recommendations include vigorous aerobic physical activity for 75 minutes/week

## 2022-12-01 LAB
ALBUMIN SERPL-MCNC: 4.3 G/DL (ref 3.8–4.8)
ALBUMIN/GLOB SERPL: 2.3 {RATIO} (ref 1.2–2.2)
ALP SERPL-CCNC: 69 IU/L (ref 44–121)
ALT SERPL-CCNC: 16 IU/L (ref 0–32)
APPEARANCE UR: CLEAR
AST SERPL-CCNC: 16 IU/L (ref 0–40)
BACTERIA URNS QL MICRO: NORMAL
BASOPHILS # BLD AUTO: 0 X10E3/UL (ref 0–0.2)
BASOPHILS NFR BLD AUTO: 1 %
BILIRUB SERPL-MCNC: 0.8 MG/DL (ref 0–1.2)
BILIRUB UR QL STRIP: NEGATIVE
BUN SERPL-MCNC: 19 MG/DL (ref 8–27)
BUN/CREAT SERPL: 30 (ref 12–28)
CALCIUM SERPL-MCNC: 9 MG/DL (ref 8.7–10.3)
CASTS URNS QL MICRO: NORMAL /LPF
CHLORIDE SERPL-SCNC: 103 MMOL/L (ref 96–106)
CHOLEST SERPL-MCNC: 195 MG/DL (ref 100–199)
CHOLEST/HDLC SERPL: 3.1 RATIO (ref 0–4.4)
CO2 SERPL-SCNC: 26 MMOL/L (ref 20–29)
COLOR UR: YELLOW
CREAT SERPL-MCNC: 0.64 MG/DL (ref 0.57–1)
EGFR: 96 ML/MIN/1.73
EOSINOPHIL # BLD AUTO: 0.2 X10E3/UL (ref 0–0.4)
EOSINOPHIL NFR BLD AUTO: 5 %
EPI CELLS #/AREA URNS HPF: NORMAL /HPF (ref 0–10)
ERYTHROCYTE [DISTWIDTH] IN BLOOD BY AUTOMATED COUNT: 13.4 % (ref 11.7–15.4)
GLOBULIN SER-MCNC: 1.9 G/DL (ref 1.5–4.5)
GLUCOSE SERPL-MCNC: 86 MG/DL (ref 70–99)
GLUCOSE UR QL: NEGATIVE
HCT VFR BLD AUTO: 40.9 % (ref 34–46.6)
HDLC SERPL-MCNC: 62 MG/DL
HGB BLD-MCNC: 13.4 G/DL (ref 11.1–15.9)
HGB UR QL STRIP: NEGATIVE
IMM GRANULOCYTES # BLD: 0 X10E3/UL (ref 0–0.1)
IMM GRANULOCYTES NFR BLD: 0 %
KETONES UR QL STRIP: NEGATIVE
LDLC SERPL CALC-MCNC: 121 MG/DL (ref 0–99)
LEUKOCYTE ESTERASE UR QL STRIP: ABNORMAL
LYMPHOCYTES # BLD AUTO: 1.5 X10E3/UL (ref 0.7–3.1)
LYMPHOCYTES NFR BLD AUTO: 35 %
MCH RBC QN AUTO: 28.5 PG (ref 26.6–33)
MCHC RBC AUTO-ENTMCNC: 32.8 G/DL (ref 31.5–35.7)
MCV RBC AUTO: 87 FL (ref 79–97)
MICRO URNS: ABNORMAL
MONOCYTES # BLD AUTO: 0.5 X10E3/UL (ref 0.1–0.9)
MONOCYTES NFR BLD AUTO: 11 %
NEUTROPHILS # BLD AUTO: 2 X10E3/UL (ref 1.4–7)
NEUTROPHILS NFR BLD AUTO: 48 %
NITRITE UR QL STRIP: NEGATIVE
PH UR STRIP: 6 [PH] (ref 5–7.5)
PLATELET # BLD AUTO: 232 X10E3/UL (ref 150–450)
POTASSIUM SERPL-SCNC: 4.2 MMOL/L (ref 3.5–5.2)
PROT SERPL-MCNC: 6.2 G/DL (ref 6–8.5)
PROT UR QL STRIP: NEGATIVE
RBC # BLD AUTO: 4.7 X10E6/UL (ref 3.77–5.28)
RBC #/AREA URNS HPF: NORMAL /HPF (ref 0–2)
SL AMB VLDL CHOLESTEROL CALC: 12 MG/DL (ref 5–40)
SODIUM SERPL-SCNC: 141 MMOL/L (ref 134–144)
SP GR UR: 1.01 (ref 1–1.03)
TRIGL SERPL-MCNC: 67 MG/DL (ref 0–149)
TSH SERPL DL<=0.005 MIU/L-ACNC: 3.54 UIU/ML (ref 0.45–4.5)
UROBILINOGEN UR STRIP-ACNC: 0.2 MG/DL (ref 0.2–1)
WBC # BLD AUTO: 4.2 X10E3/UL (ref 3.4–10.8)
WBC #/AREA URNS HPF: NORMAL /HPF (ref 0–5)

## 2022-12-20 ENCOUNTER — HOSPITAL ENCOUNTER (OUTPATIENT)
Dept: NON INVASIVE DIAGNOSTICS | Age: 67
Discharge: HOME | End: 2022-12-20

## 2022-12-20 ENCOUNTER — HOSPITAL ENCOUNTER (OUTPATIENT)
Dept: NON INVASIVE DIAGNOSTICS | Facility: HOSPITAL | Age: 67
End: 2022-12-20

## 2022-12-20 ENCOUNTER — APPOINTMENT (OUTPATIENT)
Dept: NON INVASIVE DIAGNOSTICS | Facility: HOSPITAL | Age: 67
End: 2022-12-20

## 2022-12-20 VITALS
WEIGHT: 189 LBS | BODY MASS INDEX: 34.78 KG/M2 | SYSTOLIC BLOOD PRESSURE: 129 MMHG | HEIGHT: 62 IN | HEART RATE: 79 BPM | DIASTOLIC BLOOD PRESSURE: 70 MMHG

## 2022-12-20 DIAGNOSIS — R01.1 MURMUR: ICD-10-CM

## 2022-12-20 DIAGNOSIS — R00.2 PALPITATIONS: ICD-10-CM

## 2022-12-20 LAB
AORTIC ROOT: 2.9 CM
APICAL FOUR CHAMBER EJECTION FRACTION: 64 %
ASCENDING AORTA: 3.4 CM
DOP CALC LVOT AREA: 3.14 CM2
DOP CALC LVOT DIAMETER: 2 CM
E WAVE DECELERATION TIME: 226 MS
FRACTIONAL SHORTENING: 35 % (ref 28–44)
INTERVENTRICULAR SEPTUM IN DIASTOLE (PARASTERNAL SHORT AXIS VIEW): 0.9 CM
INTERVENTRICULAR SEPTUM: 0.9 CM (ref 0.6–1.1)
LAAS-AP2: 21.2 CM2
LAAS-AP4: 19.2 CM2
LEFT ATRIUM AREA SYSTOLE SINGLE PLANE A4C: 17.1 CM2
LEFT ATRIUM SIZE: 2.8 CM
LEFT INTERNAL DIMENSION IN SYSTOLE: 3.1 CM (ref 2.1–4)
LEFT VENTRICULAR INTERNAL DIMENSION IN DIASTOLE: 4.8 CM (ref 3.5–6)
LEFT VENTRICULAR POSTERIOR WALL IN END DIASTOLE: 0.9 CM
LEFT VENTRICULAR STROKE VOLUME: 69 ML
LVSV (TEICH): 69 ML
MV E'TISSUE VEL-SEP: 11 CM/S
MV PEAK A VEL: 0.66 M/S
MV PEAK E VEL: 72 CM/S
MV STENOSIS PRESSURE HALF TIME: 66 MS
MV VALVE AREA P 1/2 METHOD: 3.33 CM2
RIGHT ATRIUM AREA SYSTOLE A4C: 16.4 CM2
RIGHT VENTRICLE ID DIMENSION: 4 CM
SL CV LEFT ATRIUM LENGTH A2C: 5.6 CM
SL CV PED ECHO LEFT VENTRICLE DIASTOLIC VOLUME (MOD BIPLANE) 2D: 108 ML
SL CV PED ECHO LEFT VENTRICLE SYSTOLIC VOLUME (MOD BIPLANE) 2D: 38 ML
TR MAX PG: 22 MMHG
TR PEAK VELOCITY: 2.4 M/S
TRICUSPID ANNULAR PLANE SYSTOLIC EXCURSION: 2.7 CM
TRICUSPID VALVE PEAK REGURGITATION VELOCITY: 2.37 M/S

## 2022-12-29 DIAGNOSIS — R00.2 PALPITATIONS: Primary | ICD-10-CM

## 2022-12-29 DIAGNOSIS — R94.31 ABNORMAL HOLTER MONITOR FINDING: ICD-10-CM

## 2023-01-30 ENCOUNTER — OFFICE VISIT (OUTPATIENT)
Dept: FAMILY MEDICINE CLINIC | Facility: CLINIC | Age: 68
End: 2023-01-30

## 2023-01-30 VITALS
HEART RATE: 77 BPM | WEIGHT: 190 LBS | DIASTOLIC BLOOD PRESSURE: 84 MMHG | TEMPERATURE: 97.9 F | RESPIRATION RATE: 18 BRPM | OXYGEN SATURATION: 99 % | SYSTOLIC BLOOD PRESSURE: 132 MMHG | HEIGHT: 62 IN | BODY MASS INDEX: 34.96 KG/M2

## 2023-01-30 DIAGNOSIS — S01.01XD LACERATION OF SCALP, SUBSEQUENT ENCOUNTER: Primary | ICD-10-CM

## 2023-01-30 DIAGNOSIS — Z48.02 ENCOUNTER FOR STAPLE REMOVAL: ICD-10-CM

## 2023-01-30 NOTE — PROGRESS NOTES
Name: Nicholas Avalos      : 1955      MRN: [id number]  Encounter Provider: Margaret Hernadez PA-C  Encounter Date: 2023   Encounter department: [facility]    Assessment & Plan     1  Laceration of scalp, subsequent encounter    2  Encounter for staple removal    Suture removal    Date/Time: 2023 8:48 AM  Performed by: Margaret Hernadez PA-C  Authorized by: Margaret Hernadez PA-C   Universal Protocol:  Consent: Verbal consent obtained  Written consent not obtained  Risks and benefits: risks, benefits and alternatives were discussed  Consent given by: patient  Time out: Immediately prior to procedure a "time out" was called to verify the correct patient, procedure, equipment, support staff and site/side marked as required  Patient understanding: patient states understanding of the procedure being performed  Patient consent: the patient's understanding of the procedure matches consent given  Procedure consent: procedure consent matches procedure scheduled  Patient identity confirmed: verbally with patient        Patient location:  Bedside  Location:     Location:  [address] [place] [address] location:  Scalp  Procedure details: Tools used: Other (comment) (Staple remover)    Wound appearance:  No sign(s) of infection and clean    Sutures removed: 3 staples  Number of staples removed:  3  Comments:      Procedure tolerated well with no complications  Subjective      HPI   55-year-old female here today for removal of 3 staples in her scalp  Patient suffered scalp laceration 7 days ago was seen at [facility] emergency department where 3 staples were placed  No complications reported  No sign of infection  Patient denies any sequela/headaches dizziness weakness  Review of systems otherwise negative    Review of Systems  As per HPI  Current Outpatient Medications on File Prior to Visit   Medication Sig   • Calcium Carb-Cholecalciferol 600-800 MG-UNIT TABS Take 1 tablet by mouth 2 (two) times a day   • estradiol (ESTRACE) 0 1 mg/g vaginal cream Insert 1 g into the vagina 2 (two) times a week Daily x 2 weeks, then 2-3 times weekly  Use lowest frequency necessary to alleviate symptoms   • ibuprofen (MOTRIN) 200 mg tablet Take 200 mg by mouth every 6 (six) hours as needed for mild pain       Objective     /84 (BP Location: Right arm, Patient Position: Sitting, Cuff Size: Large)   Pulse 77   Temp 97 9 °F (36 6 °C) (Tympanic)   Resp 18   Ht 5' 2" (1 575 m)   Wt 86 2 kg (190 lb)   LMP  (LMP Unknown)   SpO2 99%   BMI 34 75 kg/m²     Physical Exam  Vitals and nursing note reviewed  Constitutional:       General: She is not in acute distress  Appearance: She is not ill-appearing, toxic-appearing or diaphoretic  HENT:      Head:      Comments: Small laceration posterior parietal region sign of infection  3 staples removed  Cardiovascular:      Rate and Rhythm: Normal rate  Pulmonary:      Effort: Pulmonary effort is normal    Abdominal:      Tenderness: There is no abdominal tenderness  Neurological:      General: No focal deficit present  Mental Status: She is alert and oriented to person, place, and time     Psychiatric:         Mood and Affect: Mood normal        Johanna Miguel PA-C

## 2023-02-01 ENCOUNTER — OFFICE VISIT (OUTPATIENT)
Dept: OBGYN CLINIC | Facility: CLINIC | Age: 68
End: 2023-02-01

## 2023-02-01 VITALS
DIASTOLIC BLOOD PRESSURE: 70 MMHG | WEIGHT: 188.8 LBS | SYSTOLIC BLOOD PRESSURE: 130 MMHG | HEIGHT: 63 IN | BODY MASS INDEX: 33.45 KG/M2

## 2023-02-01 DIAGNOSIS — N81.3 UTERINE PROCIDENTIA: Primary | ICD-10-CM

## 2023-02-01 DIAGNOSIS — N95.2 VAGINAL ATROPHY: ICD-10-CM

## 2023-02-01 NOTE — ASSESSMENT & PLAN NOTE
- Due to worsening sensation of bulge with significant prolapse of posterior vagina noted on exam prior to pessary removal, we discussed option of trying different size or style of pessary for improved support  Patient agreeable  - Attempted size 6 ring with support without symptomatic improvement  Patient ultimately fitted for size 4 donut pessary  New pessary inserted today  - No evidence of erosion today  Continue vaginal estrogen weekly  - Given new pessary, patient to return in 1 month for pelvic exam and pessary maintenance

## 2023-02-01 NOTE — PROGRESS NOTES
[address] [facility] [facility], [address], [facility], [facility]    Assessment/Plan:  1  Uterine procidentia  Assessment & Plan:  - Due to worsening sensation of bulge with significant prolapse of posterior vagina noted on exam prior to pessary removal, we discussed option of trying different size or style of pessary for improved support  Patient agreeable  - Attempted size 6 ring with support without symptomatic improvement  Patient ultimately fitted for size 4 donut pessary  New pessary inserted today  - No evidence of erosion today  Continue vaginal estrogen weekly  - Given new pessary, patient to return in 1 month for pelvic exam and pessary maintenance  Orders:  -     Pessary    2  Vaginal atrophy  Assessment & Plan:  - Continue vaginal estrogen  Refill sent at last visit  Patient will call if she needs refill  Pessary    Date/Time: 2/1/2023 2:02 PM  Performed by: Jcarlos Gustafson MD  Authorized by: Jcarlos Gustafson MD   [place] Protocol:  Consent: Verbal consent obtained  Written consent obtained  Risks and benefits: risks, benefits and alternatives were discussed  Consent given by: patient  Time out: Immediately prior to procedure a "time out" was called to verify the correct patient, procedure, equipment, support staff and site/side marked as required  Patient understanding: patient states understanding of the procedure being performed  Patient consent: the patient's understanding of the procedure matches consent given  Procedure consent: procedure consent matches procedure scheduled  Required items: required blood products, implants, devices, and special equipment available  Patient identity confirmed: verbally with patient      Indication:     Indication for pessary: uterine prolapse and rectocele    Pre-procedure:     Pessary procedure type:   Insertion  Problems:     Pessary complications: discomfort      Pessary complications comment:  Worsening bulge sensation with size 5 ring with support  Procedure:     Pessary type:  Donut    Pessary size:  4    Patient tolerance of procedure: Tolerated well, no immediate complications        Subjective:   Michael Hancock is a 69 y o  [id number] who presents for pessary maintenance  CC:   Chief Complaint   Patient presents with   • Pessary Check     Pessary cleaning        HPI: Patient presents for routine pessary maintenance visit  Since our last visit, she reports worsening sensation of bulge and feels that prolapse is now present despite continued pessary use  Denies vaginal bleeding, discharge, or changes in bladder/bowel  ROS: Negative except as noted in HPI    No LMP recorded (lmp unknown)  Patient is postmenopausal        She  reports that she is not currently sexually active   She reports using the following method of birth control/protection: Post-menopausal        The following portions of the patient's history were reviewed and updated as appropriate:   Past Medical History:   Diagnosis Date   • Obesity    • Papanicolaou smear 01/27/2020    neg   • Rectocele    • Uterine prolapse 2020     Past Surgical History:   Procedure Laterality Date   • DILATION AND EVACUATION  1980    x7   • HERNIA REPAIR  2013   • NEUROPLASTY / TRANSPOSITION MEDIAN NERVE AT CARPAL TUNNEL  2012     Family History   Problem Relation Age of Onset   • Stroke Father    • Heart disease Father    • Diabetes Father    • Hypertension Maternal Grandmother    • Hypertension Paternal Grandmother    • Breast cancer Neg Hx    • Ovarian cancer Neg Hx    • Colon cancer Neg Hx    • Uterine cancer Neg Hx      Social History     Socioeconomic History   • Marital status: /Civil Union     Spouse name: None   • Number of children: 1   • Years of education: None   • Highest education level: None   Occupational History   • None   Tobacco Use   • Smoking status: Never   • Smokeless tobacco: Never   Vaping Use   • Vaping Use: Never used   Substance and Sexual Activity   • Alcohol use: Yes   • Drug use: Never   • Sexual activity: Not Currently     Birth control/protection: Post-menopausal   Other Topics Concern   • None   Social History Narrative    Exercises occasionally     Social Determinants of Health     Financial Resource Strain: Low Risk    • Difficulty of Paying Living Expenses: Not hard at all   Food Insecurity: Not on file   Transportation Needs: No Transportation Needs   • Lack of Transportation (Medical): No   • Lack of Transportation (Non-Medical): No   Physical Activity: Not on file   Stress: Not on file   Social Connections: Not on file   Intimate Partner Violence: Not on file   Housing Stability: Not on file     Outpatient Medications Marked as Taking for the 2/1/23 encounter (Office Visit) with Kunal High MD   Medication   • Calcium Carb-Cholecalciferol 600-800 MG-UNIT TABS   • estradiol (ESTRACE) 0 1 mg/g vaginal cream   • ibuprofen (MOTRIN) 200 mg tablet     Allergies   Allergen Reactions   • Naproxen Anaphylaxis   • Amphotericin B Rash   • Ampicillin Rash   • Penicillins Rash         Objective:  /70 (BP Location: Left arm, Patient Position: Sitting, Cuff Size: Large)   Ht 5' 2 75" (1 594 m)   Wt 85 6 kg (188 lb 12 8 oz)   LMP  (LMP Unknown)   BMI 33 71 kg/m²        Chaperone present? Yes: Jose Armando Kraus MA  General Appearance: alert and oriented, in no acute distress  Abdomen: Soft, non-tender, non-distended, no masses, no rebound or guarding  Pelvic:       External genitalia: Normal appearance, no abnormal pigmentation, no lesions or masses  Normal Bartholin's and [place]'s       Urinary system: Urethral meatus normal, bladder non-tender       Vaginal: Significant prolapse of posterior vaginal mucosa visible with extension 1-2 cm distal to hymenal ring prior to removal of pessary, representing progression of  prolapse from prior  No erosions, bleeding, or abnormal discharge noted   Pessary removed, cleaned, and replaced        Cervix: Normal-appearing, well-epithelialized, no gross lesions or masses  Extremities: Normal range of motion     Skin: normal, no rash or abnormalities  Neurologic: alert, oriented x3  Psychiatric: Appropriate affect, mood stable, cooperative with exam         Johann Jernigan MD  2/1/2023 2:05 PM

## 2023-02-20 ENCOUNTER — OFFICE VISIT (OUTPATIENT)
Dept: OBGYN CLINIC | Facility: CLINIC | Age: 68
End: 2023-02-20

## 2023-02-20 VITALS
DIASTOLIC BLOOD PRESSURE: 74 MMHG | BODY MASS INDEX: 33.93 KG/M2 | HEIGHT: 62 IN | WEIGHT: 184.4 LBS | SYSTOLIC BLOOD PRESSURE: 130 MMHG

## 2023-02-20 DIAGNOSIS — N95.2 VAGINAL ATROPHY: ICD-10-CM

## 2023-02-20 DIAGNOSIS — N81.3 UTERINE PROCIDENTIA: ICD-10-CM

## 2023-02-20 DIAGNOSIS — T83.9XXA PROBLEM WITH VAGINAL PESSARY, INITIAL ENCOUNTER (HCC): Primary | ICD-10-CM

## 2023-02-20 NOTE — ASSESSMENT & PLAN NOTE
- Due to spontaneous expulsion of size 4 donut pessary, discussed trial of larger size  After obtaining verbal consent, patient was resized for size 5 donut pessary  A new size 5 donut pessary was inserted today  - No evidence of erosion today  Continue vaginal estrogen weekly  - Given new pessary, patient to return in 1 month for pelvic exam and pessary maintenance

## 2023-02-20 NOTE — PROGRESS NOTES
Shayla Rosales, [address]    Assessment/Plan:  1  Problem with vaginal pessary, initial encounter ([facility] [address] )    2  Uterine procidentia  Assessment & Plan:  - Due to spontaneous expulsion of size 4 donut pessary, discussed trial of larger size  After obtaining verbal consent, patient was resized for size 5 donut pessary  A new size 5 donut pessary was inserted today  - No evidence of erosion today  Continue vaginal estrogen weekly  - Given new pessary, patient to return in 1 month for pelvic exam and pessary maintenance  3  Vaginal atrophy  Assessment & Plan:  - Continue vaginal estrogen  Patient will call if she needs refill  Pessary    Date/Time: 2/20/2023 3:40 PM  Performed by: Sahil Hernandez MD  Authorized by: Sahil Hernandez MD   Universal Protocol:  Consent: Verbal consent obtained  Risks and benefits: risks, benefits and alternatives were discussed  Consent given by: patient  Time out: Immediately prior to procedure a "time out" was called to verify the correct patient, procedure, equipment, support staff and site/side marked as required  Patient understanding: patient states understanding of the procedure being performed  Patient consent: the patient's understanding of the procedure matches consent given  Procedure consent: procedure consent matches procedure scheduled  Relevant documents: relevant documents present and verified  Required items: required blood products, implants, devices, and special equipment available  Patient identity confirmed: verbally with patient      Indication:     Indication for pessary: uterine prolapse and rectocele    Pre-procedure:     Pessary procedure type: Insertion  Problems:     Pessary complications: pessary fell out/lost    Procedure:     Pessary type:  Donut    Pessary size:  5    Patient tolerance of procedure:   Tolerated well, no immediate complications  Comments:     Procedure comments:  Resized for size 5 donut today, with new pessary inserted  Subjective:   Fern Arevalo is a 69 y o  [id number] presenting for evaluation after pessary dislodged at home  CC:   Chief Complaint   Patient presents with   • Pessary Check     Reinsert pessary fell out over weekend        HPI: Patient presents for re-evaluation after her new pessary spontaneously fell out over the weekend  She reports that for the preceding 2 weeks, she had been happy with the new pessary  She did feel some prolapse around the pessary, but symptoms were better than before  No bleeding while pessary in place, but she noted a small amount of blood when the pessary fell out  ROS: Negative except as noted in HPI    No LMP recorded (lmp unknown)  Patient is postmenopausal        She  reports that she is not currently sexually active   She reports using the following method of birth control/protection: Post-menopausal        The following portions of the patient's history were reviewed and updated as appropriate:   Past Medical History:   Diagnosis Date   • Obesity    • Papanicolaou smear 01/27/2020    neg   • Rectocele    • Uterine prolapse 2020     Past Surgical History:   Procedure Laterality Date   • DILATION AND EVACUATION  1980    x7   • HERNIA REPAIR  2013   • NEUROPLASTY / TRANSPOSITION MEDIAN NERVE AT CARPAL TUNNEL  2012     Family History   Problem Relation Age of Onset   • Stroke Father    • Heart disease Father    • Diabetes Father    • Hypertension Maternal Grandmother    • Hypertension Paternal Grandmother    • Breast cancer Neg Hx    • Ovarian cancer Neg Hx    • Colon cancer Neg Hx    • Uterine cancer Neg Hx      Social History     Socioeconomic History   • Marital status: /Civil Union     Spouse name: None   • Number of children: 1   • Years of education: None   • Highest education level: None   Occupational History   • None   Tobacco Use   • Smoking status: Never   • Smokeless tobacco: Never   Vaping Use   • Vaping Use: Never used Substance and Sexual Activity   • Alcohol use: Yes   • Drug use: Never   • Sexual activity: Not Currently     Birth control/protection: Post-menopausal   Other Topics Concern   • None   Social History Narrative    Exercises occasionally     Social Determinants of Health     Financial Resource Strain: Low Risk    • Difficulty of Paying Living Expenses: Not hard at all   Food Insecurity: Not on file   Transportation Needs: No Transportation Needs   • Lack of Transportation (Medical): No   • Lack of Transportation (Non-Medical): No   Physical Activity: Not on file   Stress: Not on file   Social Connections: Not on file   Intimate Partner Violence: Not on file   Housing Stability: Not on file     Outpatient Medications Marked as Taking for the 2/20/23 encounter (Office Visit) with Brian Thompson MD   Medication   • Calcium Carb-Cholecalciferol 600-800 MG-UNIT TABS   • estradiol (ESTRACE) 0 1 mg/g vaginal cream   • ibuprofen (MOTRIN) 200 mg tablet     Allergies   Allergen Reactions   • Naproxen Anaphylaxis   • Amphotericin B Rash   • Ampicillin Rash   • Penicillins Rash           Objective:  /74 (BP Location: Left arm, Patient Position: Sitting, Cuff Size: Large)   Ht 5' 2 25" (1 581 m)   Wt 83 6 kg (184 lb 6 4 oz)   LMP  (LMP Unknown)   BMI 33 46 kg/m²        Chaperone present? Yes: Ciro Babinski, MA  General Appearance: alert and oriented, in no acute distress  Abdomen: Soft, non-tender, non-distended, no masses, no rebound or guarding  Pelvic:       External genitalia: Normal appearance, no abnormal pigmentation, no lesions or masses  Normal Bartholin's and [place]'s       Urinary system: Urethral meatus normal, bladder non-tender       Vaginal: Omer III-IV prolapse of posterior and apical compartments  No erosions, bleeding, or abnormal discharge noted   Pessary fitting performed and new pessary placed        Cervix: Normal-appearing, well-epithelialized, no gross lesions or masses  Extremities: Normal range of motion     Skin: normal, no rash or abnormalities  Neurologic: alert, oriented x3  Psychiatric: Appropriate affect, mood stable, cooperative with exam         Damien Gutierrez MD  2/20/2023 3:54 PM

## 2023-03-22 ENCOUNTER — OFFICE VISIT (OUTPATIENT)
Dept: CARDIOLOGY CLINIC | Facility: CLINIC | Age: 68
End: 2023-03-22

## 2023-03-22 VITALS
HEART RATE: 64 BPM | HEIGHT: 62 IN | BODY MASS INDEX: 34.45 KG/M2 | DIASTOLIC BLOOD PRESSURE: 82 MMHG | WEIGHT: 187.2 LBS | SYSTOLIC BLOOD PRESSURE: 132 MMHG

## 2023-03-22 DIAGNOSIS — R00.2 PALPITATIONS: ICD-10-CM

## 2023-03-22 DIAGNOSIS — I49.3 FREQUENT PVCS: ICD-10-CM

## 2023-03-22 DIAGNOSIS — E78.00 PURE HYPERCHOLESTEROLEMIA: Primary | ICD-10-CM

## 2023-03-22 RX ORDER — ATORVASTATIN CALCIUM 10 MG/1
10 TABLET, FILM COATED ORAL DAILY
Qty: 90 TABLET | Refills: 3 | Status: SHIPPED | OUTPATIENT
Start: 2023-03-22

## 2023-03-22 NOTE — PROGRESS NOTES
[facility] Cardiology  Office Consultation  Josseline Cabrera 69 y o  female MRN: [id number]        Chief Complaint    Chief Complaint   Patient presents with   • Palpitations     New pt  "Palpitations > 1 yr"       Referring Provider: Cody Montes DO    Impression & Plan:    1  Palpitations  - Ambulatory Referral to Cardiology  - POCT ECG    2  Frequent PVCs  She has frequent unifocal PVCs of unclear etiology or origin  Her PVC burden is <3%, therefore the risk of developing PVC induced cardiomyopathy is low  There are no signs or symptoms of CHF or CAD as underlying etiology  There were no structural abnormalities of significant on echocardiogram  Discussed with patient various options including medical suppression with BB vs watchful waiting  Patient prefers watchful waiting  It is possible that her increased PVCs resolve on their own  She does feel less often symptomatic lately  We will repeat Holter in 6 months  - Ambulatory Referral to Cardiology  - Holter monitor; Future    3  Pure hypercholesterolemia  The patient has elevated 10-year ASCVD risk as below  She is agreeable to initiation of statin for primary prevention of ASCVD  We will start atorvastatin 10 mg daily  - atorvastatin (LIPITOR) 10 mg tablet; Take 1 tablet (10 mg total) by mouth daily  Dispense: 90 tablet; Refill: 3      The 10-year ASCVD risk score (Eri PETERSEN, et al , 2019) is: 8 7%    Values used to calculate the score:      Age: 69 years      Sex: Female      Is Non- : No      Diabetic: No      Tobacco smoker: No      Systolic Blood Pressure: 553 mmHg      Is BP treated: No      HDL Cholesterol: 62 mg/dL      Total Cholesterol: 195 mg/dL    We will see Josseline Cabrera back in 6 months for routine follow-up  HPI: Josseline Cabrera is a 69y o  year old female with palpitations presenting for evaluation  Symptoms started about a year ago  She has been getting a sensation of palpitations   She notices it at night in bed  "It is not racing"  She does not get symptoms with exertion  "I'm perfectly fine walking up the steps, I'm not out of breath or anything"  She denies heart racing or sustained symptoms  The symptoms are a heart pounding sensation  "Like it will come out of my chest"  She thinks lately her symptoms have improved  Cardiac testing:     Holter 12/27/22 -- personally reviewed and reinterpreted by me -- PVC 2 6%, no NSVT, 16 in couplets otherwise single VE's, no bigeminy/trigeminy  PVCs appear all of same morphology  TTE 12/20/22 Interpretation Summary       •  Left Ventricle: Left ventricular cavity size is normal  Wall thickness is normal  Systolic function is normal  Wall motion is normal  Diastolic function is normal   •  Tricuspid Valve: There is mild regurgitation          EKG reviewed personally:   EKG in the office 3/22/23 shows NSR, 64 bpm, normal axis, normal intervals  Nonspecific ST changes inferior  Abnormal study  Relevant Laboratory Studies:  (Laboratory studies personally reviewed)  Lipid profile 11/30/22 reviewed --  mg/dL    Review of Systems   Constitutional: Negative for activity change and fatigue  HENT: Negative for facial swelling  Eyes: Negative for visual disturbance  Respiratory: Negative for chest tightness and shortness of breath  Cardiovascular: Negative for chest pain, palpitations and leg swelling  Gastrointestinal: Negative for nausea and vomiting  Musculoskeletal: Negative for myalgias  Skin: Negative for pallor  Allergic/Immunologic: Negative for immunocompromised state  Neurological: Negative for dizziness, syncope and light-headedness  Psychiatric/Behavioral: Negative for agitation and confusion  The patient is not nervous/anxious            Past Medical History:   Diagnosis Date   • Obesity    • Papanicolaou smear 01/27/2020    neg   • Rectocele    • Uterine prolapse 2020     Past Surgical History:   Procedure Laterality Date   • DILATION AND EVACUATION  1980    x7   • HERNIA REPAIR  2013   • NEUROPLASTY / TRANSPOSITION MEDIAN NERVE AT CARPAL TUNNEL  2012     Social History     Substance and Sexual Activity   Alcohol Use Yes     Social History     Substance and Sexual Activity   Drug Use Never     Social History     Tobacco Use   Smoking Status Never   Smokeless Tobacco Never     Family History   Problem Relation Age of Onset   • Stroke Father    • Heart disease Father    • Diabetes Father    • Hypertension Maternal Grandmother    • Hypertension Paternal Grandmother    • Breast cancer Neg Hx    • Ovarian cancer Neg Hx    • Colon cancer Neg Hx    • Uterine cancer Neg Hx        Allergies: Allergies   Allergen Reactions   • Naproxen Anaphylaxis   • Amphotericin B Rash   • Ampicillin Rash   • Penicillins Rash       Medications (as of START of this encounter): Outpatient Medications Prior to Visit   Medication Sig Dispense Refill   • Calcium Carb-Cholecalciferol 600-800 MG-UNIT TABS Take 1 tablet by mouth 2 (two) times a day     • estradiol (ESTRACE) 0 1 mg/g vaginal cream Insert 1 g into the vagina 2 (two) times a week Daily x 2 weeks, then 2-3 times weekly  Use lowest frequency necessary to alleviate symptoms 42 5 g 1   • ibuprofen (MOTRIN) 200 mg tablet Take 200 mg by mouth every 6 (six) hours as needed for mild pain       No facility-administered medications prior to visit  Vitals:    03/22/23 0954   BP: 132/82   Pulse: 64     Weight (last 2 days)     Date/Time Weight    03/22/23 0954 84 9 (187 2)          General: Alison Springer is a well appearing female, in no acute distress, sitting comfortably  HEENT: moist mucous membranes, EOMI  Neck:  No JVD, supple, trachea midline   Cardiovascular: unremarkable S1/S2, regular rate and rhythm, no murmurs, rubs or gallops   Pulmonary: normal respiratory effort, CTAB   Abdomen: soft and nondistended  Extremities: No lower extremity edema  Warm and well perfused extremities     Neuro: no focal motor deficits, AAOx3 (person, place, time)  Psych: Normal mood and affect, cooperative        Time Spent:  Total time (face-to-face and non-face-to-face) spent on today's visit was 40 minutes  This includes preparation for the visits (i e  reviewing test results), performance of a medically appropriate history and examination, and orders for medications, tests or other procedures  This time is exclusive of procedures performed and time spent teaching  Supa Taylor MD    This note was completed in part utilizing [employer] [facility] [employer] recognition software  Grammatical errors, random word insertion, spelling mistakes, occasional wrong word or "sound-alike" substitutions and incomplete sentences may be an occasional consequence of the system secondary to software limitations, ambient noise and hardware issues  At the time of dictation, efforts were made to edit, clarify and /or correct errors  Please read the chart carefully and recognize, using context, where substitutions have occurred  If you have any questions or concerns about the context, text or information contained within the body of this dictation, please contact myself, the provider, for further clarification

## 2023-04-03 ENCOUNTER — TELEPHONE (OUTPATIENT)
Dept: OBGYN CLINIC | Facility: CLINIC | Age: 68
End: 2023-04-03

## 2023-04-03 ENCOUNTER — OFFICE VISIT (OUTPATIENT)
Dept: OBGYN CLINIC | Facility: CLINIC | Age: 68
End: 2023-04-03

## 2023-04-03 VITALS
DIASTOLIC BLOOD PRESSURE: 64 MMHG | BODY MASS INDEX: 32.96 KG/M2 | WEIGHT: 186 LBS | HEIGHT: 63 IN | SYSTOLIC BLOOD PRESSURE: 122 MMHG

## 2023-04-03 DIAGNOSIS — T83.9XXA PROBLEM WITH VAGINAL PESSARY, INITIAL ENCOUNTER (HCC): Primary | ICD-10-CM

## 2023-04-03 DIAGNOSIS — N81.3 UTERINE PROCIDENTIA: ICD-10-CM

## 2023-04-03 DIAGNOSIS — N76.0 BACTERIAL VAGINAL INFECTION: ICD-10-CM

## 2023-04-03 DIAGNOSIS — Z96.0 VAGINAL PESSARY IN SITU: ICD-10-CM

## 2023-04-03 DIAGNOSIS — B96.89 BACTERIAL VAGINAL INFECTION: ICD-10-CM

## 2023-04-03 RX ORDER — OXYQUINOLINE SULFATE AND SODIUM LAURYL SULFATE .25; .1 MG/G; MG/G
2 JELLY VAGINAL 3 TIMES WEEKLY
Qty: 113.4 G | Refills: 1 | Status: SHIPPED | OUTPATIENT
Start: 2023-04-03

## 2023-04-03 RX ORDER — METRONIDAZOLE 7.5 MG/G
GEL TOPICAL
Qty: 45 G | Refills: 0 | Status: SHIPPED | OUTPATIENT
Start: 2023-04-03 | End: 2023-04-10

## 2023-04-03 NOTE — TELEPHONE ENCOUNTER
Return call to [facility] who states she is having internal itching   passed away last week  Offered appt for today in [place] with Dr VANCE  She is checking for transportation  [facility] if unable to make appt  Also has appt on 4/10/2023 for same which can be cancelled if unable to keep today's visit

## 2023-04-03 NOTE — TELEPHONE ENCOUNTER
Has an appointment for a Pessary Check 4/10, wishes to see Dr Azael Stack, should of seen him previous on 3/24 however, spouse had passed away  Has a few questions to ask from nursing department   Call back [phone]

## 2023-04-03 NOTE — PROGRESS NOTES
"Shayla Rosales, [address]    Assessment/Plan:  1  Problem with vaginal pessary, initial encounter ([address] )  -     Pessary    2  Bacterial vaginal infection  -     metroNIDAZOLE (METROGEL) 0 75 % gel; Apply topically daily at bedtime for 7 days    3  Uterine procidentia  -     Pessary    4  Vaginal pessary in situ  Assessment & Plan:  - Pessary removed, cleaned and replaced  No erosions noted on speculum exam    - Copious malodorous discharge noted on exam today  Will treat with Metrogel x 7 nights followed by Madi Kline 2-3 times weekly for maintenance  Rx provided  - Return to office in 2-3 weeks for reassessment  Orders:  -     Oxyquinoline-Sod Lauryl Sulf (Trimo-Gu) 0 025-0 01 % GEL; Insert 2 g into the vagina 3 (three) times a week  -     Pessary    Pessary    Date/Time: 4/3/2023 1:40 PM  Performed by: Carl Watson MD  Authorized by: Carl Watson MD   Universal Protocol:  Consent: Verbal consent obtained  Risks and benefits: risks, benefits and alternatives were discussed  Consent given by: patient  Time out: Immediately prior to procedure a \"time out\" was called to verify the correct patient, procedure, equipment, support staff and site/side marked as required  Patient understanding: patient states understanding of the procedure being performed  Patient consent: the patient's understanding of the procedure matches consent given  Required items: required blood products, implants, devices, and special equipment available  Patient identity confirmed: verbally with patient      Indication:     Indication for pessary: uterine prolapse and rectocele    Pre-procedure:     Pessary procedure type:  Cleaning/check  Problems:     Pessary complications: foul odor, itching, discomfort and vaginal discharge    Procedure:     Pessary type:  Donut    Pessary size:  5    Patient tolerance of procedure:   Tolerated with difficulty  Comments:     Procedure comments:  Abrasion of " left posterior fourchette occurred with removal  Light bleeding occurred  Subjective:   Henry Eugene is a 69 y o  [id number] presenting for evaluation of vaginal discharge and irritation in setting of pessary  CC:   Chief Complaint   Patient presents with   • Pessary Check     Vaginal itching and discharge , had some spotting on 3/18 and   HPI: Patient presents for evaluation of vaginal irritation and discharge  She states isolated spotting 3/18 and 3/19, but none since  Of note, her  unfortunately passed away last week and she is currently making arrangements for his upcoming       ROS: Negative except as noted in HPI    No LMP recorded (lmp unknown)  Patient is postmenopausal        She  reports that she is not currently sexually active   She reports using the following method of birth control/protection: Post-menopausal        The following portions of the patient's history were reviewed and updated as appropriate:   Past Medical History:   Diagnosis Date   • Obesity    • Papanicolaou smear 2020    neg   • Rectocele    • Uterine prolapse      Past Surgical History:   Procedure Laterality Date   • DILATION AND EVACUATION  1980    x7   • HERNIA REPAIR     • NEUROPLASTY / TRANSPOSITION MEDIAN NERVE AT CARPAL TUNNEL  2012     Family History   Problem Relation Age of Onset   • Stroke Father    • Heart disease Father    • Diabetes Father    • Hypertension Maternal Grandmother    • Hypertension Paternal Grandmother    • Breast cancer Neg Hx    • Ovarian cancer Neg Hx    • Colon cancer Neg Hx    • Uterine cancer Neg Hx      Social History     Socioeconomic History   • Marital status: /Civil Union     Spouse name: None   • Number of children: 1   • Years of education: None   • Highest education level: None   Occupational History   • None   Tobacco Use   • Smoking status: Never   • Smokeless tobacco: Never   Vaping Use   • Vaping Use: Never used   Substance and Sexual "Activity   • Alcohol use: Yes   • Drug use: Never   • Sexual activity: Not Currently     Birth control/protection: Post-menopausal   Other Topics Concern   • None   Social History Narrative    Exercises occasionally     Social Determinants of Health     Financial Resource Strain: Low Risk    • Difficulty of Paying Living Expenses: Not hard at all   Food Insecurity: Not on file   Transportation Needs: No Transportation Needs   • Lack of Transportation (Medical): No   • Lack of Transportation (Non-Medical): No   Physical Activity: Not on file   Stress: Not on file   Social Connections: Not on file   Intimate Partner Violence: Not on file   Housing Stability: Not on file     Outpatient Medications Marked as Taking for the 4/3/23 encounter (Office Visit) with Eyal Sunshine MD   Medication   • atorvastatin (LIPITOR) 10 mg tablet   • Calcium Carb-Cholecalciferol 600-800 MG-UNIT TABS   • estradiol (ESTRACE) 0 1 mg/g vaginal cream   • ibuprofen (MOTRIN) 200 mg tablet   • metroNIDAZOLE (METROGEL) 0 75 % gel   • Oxyquinoline-Sod Lauryl Sulf (Trimo-Gu) 0 025-0 01 % GEL     Allergies   Allergen Reactions   • Naproxen Anaphylaxis   • Amphotericin B Rash   • Ampicillin Rash   • Penicillins Rash           Objective:  /64 (BP Location: Left arm, Patient Position: Sitting, Cuff Size: Standard)   Ht 5' 2 75\" (1 594 m)   Wt 84 4 kg (186 lb)   LMP  (LMP Unknown)   BMI 33 21 kg/m²        Chaperone present? Yes: Wynema Skiff, MA  General Appearance: alert and oriented, in no acute distress  Abdomen: Soft, non-tender, non-distended, no masses, no rebound or guarding  Pelvic:       External genitalia: Normal appearance, no abnormal pigmentation, no lesions or masses  Normal Bartholin's and [place]'s  Urinary system: Urethral meatus normal, bladder non-tender  Vaginal: Stage III-IV prolapse of posterior and apical compartments  No erosions, bleeding  Copious malodorous discharge noted with removal of pessary         " Cervix: Normal-appearing, well-epithelialized, no gross lesions or masses  Adnexa: No adnexal masses or tenderness noted  Uterus: Normal-sized, regular contour, midline, mobile, no uterine tenderness  Extremities: Normal range of motion     Skin: normal, no rash or abnormalities  Neurologic: alert, oriented x3  Psychiatric: Appropriate affect, mood stable, cooperative with exam         Blake Wynn MD  4/3/2023 3:02 PM

## 2023-04-03 NOTE — PATIENT INSTRUCTIONS
Metrogel (Use first): Apply 1 applicatorful (5 grams) nightly for 7 nights    [address] (use after completing Metrogel): Apply HALF an applicatorful (2 grams) three times weekly for 1 week, then twice weekly thereafter

## 2023-04-03 NOTE — ASSESSMENT & PLAN NOTE
- Pessary removed, cleaned and replaced  No erosions noted on speculum exam    - Copious malodorous discharge noted on exam today  Will treat with Metrogel x 7 nights followed by Marigene Butter 2-3 times weekly for maintenance  Rx provided  - Return to office in 2-3 weeks for reassessment

## 2023-04-05 ENCOUNTER — TELEPHONE (OUTPATIENT)
Dept: OBGYN CLINIC | Facility: CLINIC | Age: 68
End: 2023-04-05

## 2023-04-05 DIAGNOSIS — N76.0 BACTERIAL VAGINAL INFECTION: Primary | ICD-10-CM

## 2023-04-05 DIAGNOSIS — B96.89 BACTERIAL VAGINAL INFECTION: Primary | ICD-10-CM

## 2023-04-05 RX ORDER — METRONIDAZOLE 7.5 MG/G
1 GEL VAGINAL
Qty: 70 G | Refills: 0 | Status: SHIPPED | OUTPATIENT
Start: 2023-04-05

## 2023-04-05 NOTE — TELEPHONE ENCOUNTER
Rx fixed and sent  Each application should be 5 grams  One 70 gram tube should be sufficient for 7-day course      Edenilson Soriano MD  4/5/2023 5:42 PM

## 2023-04-05 NOTE — TELEPHONE ENCOUNTER
[facility] pharmacist Afia Palafox  called informing metroNIDAZOLE 0 75 % gel was sent for Topical  x7 days use not vaginal use  Metrogel was ordered for BV  Pt used one dose of topical only  Topical does not include applicator  Pt called the pharmacy for applicator  After review of script, topical was ordered  Please review Rx, if vaginal use preferred, needs to be resent as metroNIDAZOLE 0 75 % vaginal gel x5 days  If 7 days is preferred pt will need 2 boxes

## 2023-04-26 ENCOUNTER — OFFICE VISIT (OUTPATIENT)
Dept: OBGYN CLINIC | Facility: CLINIC | Age: 68
End: 2023-04-26

## 2023-04-26 VITALS — WEIGHT: 185 LBS | DIASTOLIC BLOOD PRESSURE: 76 MMHG | BODY MASS INDEX: 33.03 KG/M2 | SYSTOLIC BLOOD PRESSURE: 132 MMHG

## 2023-04-26 DIAGNOSIS — N30.01 ACUTE CYSTITIS WITH HEMATURIA: ICD-10-CM

## 2023-04-26 DIAGNOSIS — N81.3 UTERINE PROCIDENTIA: Primary | ICD-10-CM

## 2023-04-26 DIAGNOSIS — Z96.0 VAGINAL PESSARY IN SITU: ICD-10-CM

## 2023-04-26 DIAGNOSIS — N95.2 VAGINAL ATROPHY: ICD-10-CM

## 2023-04-26 DIAGNOSIS — N76.0 ACUTE VAGINITIS: ICD-10-CM

## 2023-04-26 LAB
SL AMB  POCT GLUCOSE, UA: NORMAL
SL AMB LEUKOCYTE ESTERASE,UA: NORMAL
SL AMB POCT BILIRUBIN,UA: NORMAL
SL AMB POCT BLOOD,UA: NORMAL
SL AMB POCT CLARITY,UA: NORMAL
SL AMB POCT COLOR,UA: NORMAL
SL AMB POCT KETONES,UA: NORMAL
SL AMB POCT NITRITE,UA: NORMAL
SL AMB POCT PH,UA: 5
SL AMB POCT SPECIFIC GRAVITY,UA: 1.05
SL AMB POCT URINE PROTEIN: NORMAL
SL AMB POCT UROBILINOGEN: NORMAL

## 2023-04-26 RX ORDER — FLUCONAZOLE 150 MG/1
150 TABLET ORAL ONCE
Qty: 2 TABLET | Refills: 0 | Status: SHIPPED | OUTPATIENT
Start: 2023-04-26 | End: 2023-04-26

## 2023-04-26 RX ORDER — SULFAMETHOXAZOLE AND TRIMETHOPRIM 800; 160 MG/1; MG/1
1 TABLET ORAL EVERY 12 HOURS SCHEDULED
Qty: 6 TABLET | Refills: 0 | Status: SHIPPED | OUTPATIENT
Start: 2023-04-26 | End: 2023-04-29

## 2023-04-26 NOTE — ASSESSMENT & PLAN NOTE
- Improved from prior, but itching and discharge persists  Exam consistent with yeast  Will treat with Diflucan PO following completion of antibiotic course for UTI

## 2023-04-26 NOTE — PROGRESS NOTES
"[address], [address], [place] [address] [facility]    Assessment/Plan:  1  Uterine procidentia  Assessment & Plan:  - Size 5 donut pessary removed and cleaned  No erosions on exam  Pessary is successfully treating her prolapse, but she is having intolerable discomfort with removal and reinsertion, including superficial laceration of hymenal ring  There is also concern for potential urinary retention  We discussed options, and she desires to try a larger size ring pessary with support  She was fitted for size 7 ring with support today  Will order pessary and return for insertion once available  - No evidence of erosion today  Continue vaginal estrogen weekly  2  Acute vaginitis  Assessment & Plan:  - Improved from prior, but itching and discharge persists  Exam consistent with yeast  Will treat with Diflucan PO following completion of antibiotic course for UTI  Orders:  -     fluconazole (DIFLUCAN) 150 mg tablet; Take 1 tablet (150 mg total) by mouth once for 1 dose Then repeat dose in 48 hours    3  Acute cystitis with hematuria  Assessment & Plan:  - Symptoms and urine dipstick in office suggestive of UTI  Will initiate presumptive treatment with Bactrim  Rx provided  - Urinalysis and urine culture collected and sent for confirmation and sensitivity testing  Orders:  -     Urinalysis with microscopic  -     Urine culture  -     sulfamethoxazole-trimethoprim (BACTRIM DS) 800-160 mg per tablet; Take 1 tablet by mouth every 12 (twelve) hours for 3 days  -     POCT urine dip    4  Vaginal pessary in situ    5  Vaginal atrophy    Pessary    Date/Time: 4/26/2023 11:00 AM  Performed by: Rosy Christian MD  Authorized by: Rosy Christian MD   Universal Protocol:  Consent: Verbal consent obtained    Risks and benefits: risks, benefits and alternatives were discussed  Consent given by: patient  Time out: Immediately prior to procedure a \"time out\" was called to verify the correct " patient, procedure, equipment, support staff and site/side marked as required  Patient understanding: patient states understanding of the procedure being performed  Required items: required blood products, implants, devices, and special equipment available      Indication:     Indication for pessary: uterine prolapse    Pre-procedure:     Pessary procedure type:  Resize  Problems:     Pessary complications: irritation, vaginal discharge and difficulty urinating    Procedure:     Pessary size:  Size 7 ring with support    Patient tolerance of procedure: Tolerated with difficulty  Comments:     Procedure comments:  Size 5 donut pessary removed with some difficulty  Incidental superficial laceration of hymenal ring occurred, but was hemostatic  Patient fitted for size 7 ring with support  Following fitting, patient requested replacement of her prior size 5 ring with support, which she brought with her today  She declined reinsertion of her size 5 donut pessary  Subjective:   Manuel Rudolph is a 69 y o  [id number]   CC:   Chief Complaint   Patient presents with   • Pessary Check       HPI: Patient presents for pessary check  She reports that after treatment with Metrogel vaginally, her discharge and odor improved, but she still has persistent vaginal irritation  She started Trimo-Gu this week and symptoms are stable  However, she reports increased urinary frequency, including waking at night  ROS: Negative except as noted in HPI    No LMP recorded (lmp unknown)  Patient is postmenopausal        She  reports that she is not currently sexually active   She reports using the following method of birth control/protection: Post-menopausal        The following portions of the patient's history were reviewed and updated as appropriate:   Past Medical History:   Diagnosis Date   • Obesity    • Papanicolaou smear 01/27/2020    neg   • Rectocele    • Uterine prolapse 2020     Past Surgical History:   Procedure Laterality Date   • DILATION AND EVACUATION  1980    x7   • HERNIA REPAIR  2013   • NEUROPLASTY / TRANSPOSITION MEDIAN NERVE AT CARPAL TUNNEL  2012     Family History   Problem Relation Age of Onset   • Stroke Father    • Heart disease Father    • Diabetes Father    • Hypertension Maternal Grandmother    • Hypertension Paternal Grandmother    • Breast cancer Neg Hx    • Ovarian cancer Neg Hx    • Colon cancer Neg Hx    • Uterine cancer Neg Hx      Social History     Socioeconomic History   • Marital status: /Civil Union     Spouse name: None   • Number of children: 1   • Years of education: None   • Highest education level: None   Occupational History   • None   Tobacco Use   • Smoking status: Never   • Smokeless tobacco: Never   Vaping Use   • Vaping Use: Never used   Substance and Sexual Activity   • Alcohol use: Yes   • Drug use: Never   • Sexual activity: Not Currently     Birth control/protection: Post-menopausal   Other Topics Concern   • None   Social History Narrative    Exercises occasionally     Social Determinants of Health     Financial Resource Strain: Low Risk    • Difficulty of Paying Living Expenses: Not hard at all   Food Insecurity: Not on file   Transportation Needs: No Transportation Needs   • Lack of Transportation (Medical): No   • Lack of Transportation (Non-Medical):  No   Physical Activity: Not on file   Stress: Not on file   Social Connections: Not on file   Intimate Partner Violence: Not on file   Housing Stability: Not on file     Outpatient Medications Marked as Taking for the 4/26/23 encounter (Office Visit) with Raheel Yuen MD   Medication   • atorvastatin (LIPITOR) 10 mg tablet   • Calcium Carb-Cholecalciferol 600-800 MG-UNIT TABS   • fluconazole (DIFLUCAN) 150 mg tablet   • ibuprofen (MOTRIN) 200 mg tablet   • Oxyquinoline-Sod Lauryl Sulf (Trimo-Gu) 0 025-0 01 % GEL   • sulfamethoxazole-trimethoprim (BACTRIM DS) 800-160 mg per tablet     Allergies   Allergen Reactions   • Naproxen Anaphylaxis   • Amphotericin B Rash   • Ampicillin Rash   • Penicillins Rash           Objective:  /76 (BP Location: Right arm, Patient Position: Sitting, Cuff Size: Large)   Wt 83 9 kg (185 lb)   LMP  (LMP Unknown)   BMI 33 03 kg/m²        Chaperone present? Yes: Traci Conteh RN  General Appearance: alert and oriented, in no acute distress  Abdomen: Soft, non-tender, non-distended, no masses, no rebound or guarding  Pelvic:       External genitalia: Normal appearance, no abnormal pigmentation, no lesions or masses  Normal Bartholin's and [place]'s  Urinary system: Urethral meatus normal, bladder non-tender  Vaginal: Stage III-IV prolapse of posterior and apical compartments  No erosions or bleeding noted  Moderate amount of discharge noted, but improved from prior  Cervix: Normal-appearing, well-epithelialized, no gross lesions or masses  No cervical motion tenderness  Adnexa: No adnexal masses or tenderness noted  Uterus: Normal-sized, regular contour, midline, mobile, no uterine tenderness  Extremities: Normal range of motion     Skin: normal, no rash or abnormalities  Neurologic: alert, oriented x3  Psychiatric: Appropriate affect, mood stable, cooperative with exam         Blake Wynn MD  4/26/2023 2:01 PM

## 2023-04-26 NOTE — ASSESSMENT & PLAN NOTE
- Symptoms and urine dipstick in office suggestive of UTI  Will initiate presumptive treatment with Bactrim  Rx provided  - Urinalysis and urine culture collected and sent for confirmation and sensitivity testing

## 2023-04-26 NOTE — ASSESSMENT & PLAN NOTE
- Size 5 donut pessary removed and cleaned  No erosions on exam  Pessary is successfully treating her prolapse, but she is having intolerable discomfort with removal and reinsertion, including superficial laceration of hymenal ring  There is also concern for potential urinary retention  We discussed options, and she desires to try a larger size ring pessary with support  She was fitted for size 7 ring with support today  Will order pessary and return for insertion once available  - No evidence of erosion today  Continue vaginal estrogen weekly

## 2023-04-28 LAB
APPEARANCE UR: CLEAR
BACTERIA UR CULT: ABNORMAL
BACTERIA URNS QL MICRO: ABNORMAL
BILIRUB UR QL STRIP: NEGATIVE
CASTS URNS QL MICRO: ABNORMAL /LPF
COLOR UR: ABNORMAL
EPI CELLS #/AREA URNS HPF: ABNORMAL /HPF (ref 0–10)
GLUCOSE UR QL: NEGATIVE
HGB UR QL STRIP: ABNORMAL
KETONES UR QL STRIP: NEGATIVE
LEUKOCYTE ESTERASE UR QL STRIP: ABNORMAL
Lab: ABNORMAL
MICRO URNS: ABNORMAL
NITRITE UR QL STRIP: NEGATIVE
PH UR STRIP: 6.5 [PH] (ref 5–7.5)
PROT UR QL STRIP: ABNORMAL
RBC #/AREA URNS HPF: ABNORMAL /HPF (ref 0–2)
SP GR UR: 1.01 (ref 1–1.03)
UROBILINOGEN UR STRIP-ACNC: 0.2 MG/DL (ref 0.2–1)
WBC #/AREA URNS HPF: >30 /HPF (ref 0–5)

## 2023-05-02 NOTE — RESULT ENCOUNTER NOTE
Spoke with Mariella Walden whom states felt better after taking Bactrim  She took second dose of Diflucan today  She is still having some vaginal itching  She denies any urinary frequency or pain  Mariella Walden said had less periods of nocturnal urination when taking Bactrim  She is still usingTrimosan ointment too

## 2023-05-10 ENCOUNTER — TELEPHONE (OUTPATIENT)
Dept: CARDIOLOGY CLINIC | Facility: CLINIC | Age: 68
End: 2023-05-10

## 2023-05-12 ENCOUNTER — TELEPHONE (OUTPATIENT)
Dept: CARDIOLOGY CLINIC | Facility: CLINIC | Age: 68
End: 2023-05-12

## 2023-05-12 NOTE — TELEPHONE ENCOUNTER
Pt started taking Atorvastatin approx 4/17/23  The past week she has had leg cramps every night and has started to have headaches throughout the day  She was wondering if this could be med related  Pt also would like to know if she could try to lower her cholesterol through diet if these SE are possibly from her statin  Pt's  recently passed and she is now able to eat healthier  Please advise  Thank You      Dr Rosendo Gordon will send to covering

## 2023-05-12 NOTE — TELEPHONE ENCOUNTER
I have reviewed Active Medication List, Allergies, Vital Signs and Active Problem List.    Chief Complaint   Patient presents with   • Follow-up     4 month fuv / lab        Asthma    Subjective:    Chief complaint: Asthma    Darius is a 50 year old male with a history of asthma.Status:  Well controlled. He denies any recent issues with shortness of breath or wheezing.     He has prediabetes. STATUS: well controlled. He denies any polyuria or polydypsia. Blood sugars are being followed with his labwork.      He also has a history of hyperlipidemia/dyslipidemia. Status:  significantly improved from last visit. , Most recent lipid panel reveals:      Lab Results   Component Value Date    FSTS 10 06/06/2019    CHOLESTEROL 168 06/06/2019    TRIGLYCERIDE 183 (H) 06/06/2019    HDL 38 (L) 06/06/2019    CHOHDL 4.4 06/06/2019    CALCLDL 93 06/06/2019    NONHDL 130 06/06/2019    and He is on a statin at the present time and denies side effects.      He voices no other additional concerns at this time.     Current Outpatient Medications   Medication Sig Dispense Refill   • warfarin (COUMADIN) 10 MG tablet Take 1 tablet by mouth daily. 30 tablet 11   • warfarin (COUMADIN) 1 MG tablet 0.5 mg daily or as directed 30 tablet 5   • warfarin (COUMADIN) 7.5 MG tablet Take 1 tablet by mouth daily. 30 tablet 5   • traZODone (DESYREL) 100 MG tablet Take 1 tablet by mouth nightly. 30 tablet 11   • atorvastatin (LIPITOR) 40 MG tablet Take 1 tablet by mouth daily. 90 tablet 3   • lidocaine (XYLOCAINE) 5 % ointment Apply topically as needed.     • Omega 3 1000 MG capsule Take 1,000 mg by mouth daily.      • baclofen (LIORESAL) 10 MG tablet Take 10 mg by mouth as needed.      • DULoxetine (CYMBALTA) 60 MG capsule Take 60 mg by mouth 2 times daily.     • morphine, IMM REL, (MSIR) 30 MG tablet Take 30 mg by mouth every 12 hours.     • pregabalin (LYRICA) 150 MG capsule Take 150 mg by mouth 2 times daily.     • AMITIZA 24 MCG capsule Take 24  I had called like 3 days ago and you called me back this morning when I wasn't here, so I'm returning the call again  Thank you  mcg by mouth daily.  0     No current facility-administered medications for this visit.            Objective:    Visit Vitals  /84   Pulse 76   Temp 98.6 °F (37 °C) (Temporal)   Resp 16   Wt 120.2 kg   BMI 34.96 kg/m²       General: Well Developed, Well Nourished. Nontoxic in appearance  HEENT:Conjunctiva are pink, Oropharynx is moist and without exudates  Neck: No carotid bruits are noted bilaterally  Cv: Regular Heart Rate and Rhythm. Normal S1 and S2.  No Murmurs, gallops or rubs.  No S3 or S4 heard.  Lungs:  Respiratory effort is normal. No rales, rhonchi, or Wheezing is noted.  No accessory muscle use is noted.  Musculoskeletal/extremities:no edema noted to the bilateral lower extremities  Neurologic: Awake, Alert and Oriented to Person, Place, Time and Situation  Skin: Warm, Dry and Intact. No cyanosis or pallor.  No clubbing.  No rashes.  Psychiatric: Normal Mood and Affect. Thought content normal.     Assessment:    Labs:    Lab Services on 06/06/2019   Component Date Value Ref Range Status   • Hemoglobin A1C 06/06/2019 5.4  4.5 - 5.6 % Final    Comment:    ----DIABETIC SCREENING---  NON DIABETIC                 <5.7%  INCREASED RISK                5.7-6.4%  DIAGNOSTIC FOR DIABETES      >6.4%     ----DIABETIC CONTROL---     A1C%           eAG mg/dL  6.0            126  6.5            140  7.0            154  7.5            169  8.0            183  8.5            197  9.0            212  9.5            226  10.0           240     • VITAMIND, 25 HYDROXY 06/06/2019 24.3* 30.0 - 100.0 ng/mL Final    Comment: <20  ng/mL=Vitamin D deficiency  20-29  ng/mL=Vitamin D insufficiency   ng/mL=Optimal Vitamin D  >150 ng/mL=Possible toxicity     • FASTING STATUS 06/06/2019 10  hrs Final   • CHOLESTEROL 06/06/2019 168  <200 mg/dL Final    Comment:    Desirable            <200  Borderline High      200 to 239  High                 >=240        • CALCULATED LDL 06/06/2019 93  <130 mg/dL Final    Comment: OPTIMAL                <100  NEAR OPTIMAL          100-129  BORDERLINE HIGH       130-159  HIGH                  160-189  VERY HIGH             >=190     • HDL 06/06/2019 38* >39 mg/dL Final    Comment: Low            <40  Borderline Low 40 to 49  Near Optimal   50 to 59  Optimal        >=60     • TRIGLYCERIDE 06/06/2019 183* <150 mg/dL Final    Comment: Normal                   <150  Borderline High          150 to 199  High                     200 to 499  Very High                >=500     • CALCULATED NON HDL 06/06/2019 130  mg/dL Final    Comment:    Therapeutic Target:  CHD and risk equivalents <130  Multiple risk factors    <160  0 to 1 risk factors      <190        • CHOL/HDL 06/06/2019 4.4  <4.5 Final   • Fasting Status 06/06/2019 10  hrs Final   • Sodium 06/06/2019 143  135 - 145 mmol/L Final   • Potassium 06/06/2019 4.4  3.4 - 5.1 mmol/L Final   • Chloride 06/06/2019 106  98 - 107 mmol/L Final   • Carbon Dioxide 06/06/2019 26  21 - 32 mmol/L Final   • Anion Gap 06/06/2019 15  10 - 20 mmol/L Final   • Glucose 06/06/2019 95  65 - 99 mg/dL Final   • BUN 06/06/2019 8  6 - 20 mg/dL Final   • Creatinine 06/06/2019 0.82  0.67 - 1.17 mg/dL Final   • GFR Estimate,  06/06/2019 >90   Final    eGFR results = or >90 mL/min/1.73m2 = Normal kidney function.   • GFR Estimate, Non  06/06/2019 >90   Final    eGFR results = or >90 mL/min/1.73m2 = Normal kidney function.   • BUN/Creatinine Ratio 06/06/2019 10  7 - 25 Final   • CALCIUM 06/06/2019 8.3* 8.4 - 10.2 mg/dL Final   • TOTAL BILIRUBIN 06/06/2019 0.3  0.2 - 1.0 mg/dL Final   • AST/SGOT 06/06/2019 23  <38 Units/L Final   • ALT/SGPT 06/06/2019 38  <64 Units/L Final   • ALK PHOSPHATASE 06/06/2019 65  45 - 117 Units/L Final   • TOTAL PROTEIN 06/06/2019 7.0  6.4 - 8.2 g/dL Final   • Albumin 06/06/2019 3.7  3.6 - 5.1 g/dL Final   • GLOBULIN 06/06/2019 3.3  2.0 - 4.0 g/dL Final   • A/G Ratio, Serum 06/06/2019 1.1  1.0 - 2.4 Final   • PROTIME 06/06/2019  24.3* 9.7 - 11.8 sec Final   • INR 06/06/2019 2.4   Final    INR Therapeutic Range: 2.0 to 3.0 (2.5 to 3.5 recommended for recurrent thrombotic episodes and mechanical prosthetic heart valves.)        Diagnoses pertaining to today's visit/meds/labs:    I10 Benign essential HTN  (primary encounter diagnosis)  E78.5 Hyperlipidemia, unspecified hyperlipidemia type  R73.01 IFG (impaired fasting glucose)  J45.20 Mild intermittent asthma without complication  E55.9 Vitamin D deficiency  E66.01 Severe obesity (BMI 35.0-39.9) with comorbidity (CMS/HCC)  Z51.81, Z79.01 Anticoagulated on Coumadin-lifelong due to multiple clotting events.  PE and PFO with stroke  Z12.5 Screening PSA (prostate specific antigen)     Orders Placed This Encounter   • Comprehensive Metabolic Panel   • Glycohemoglobin   • Lipid Panel with Reflex   • Vitamin D -25 Hydroxy   • Thyroid Stimulating Hormone   • CBC No Differential       __________________________________________      SEE FURTHER DISCUSSION BELOW       Plan:  1. Asthma-stable. He will continue present management.   2 Hyperlipidemia/Dyslipidemia- Lipid panel reviewed and is as discussed above.  Continue current medical regimen.  Continue current dietary efforts.   3 prediabetes- Blood sugars are reasonably stable based upon labs. Continue current lifestyle/dietary efforts. Importance of diet and excercise discussed at this time.  Importance of weight loss discussed at this time.   4.  Obesity with comorbidities as noted above-  Body mass index is 34.96 kg/m²..  He is noted to have the following weight related comorbidities:   prediabetes, dyslipidemia and sleep apnea Counseling was provided at this time to work on weight loss. For details, refer to After Visit Summary.  5. Pain management-followed in pain clinic  6. Vitamin D deficiency-increasing supplementation to 2000 units daily  We will plan on following up in  4  months We will plan on discussing WELLNESS issues at that time if able.

## 2023-06-25 PROBLEM — N30.01 ACUTE CYSTITIS WITH HEMATURIA: Status: RESOLVED | Noted: 2023-04-26 | Resolved: 2023-06-25

## 2023-07-10 ENCOUNTER — TELEPHONE (OUTPATIENT)
Dept: OBGYN CLINIC | Facility: CLINIC | Age: 68
End: 2023-07-10

## 2023-07-10 NOTE — TELEPHONE ENCOUNTER
Sandy staples/segundo following up to pessary order. Per Jluis Santana, pessary is in. Can be scheduled either 7/12/2023 @12:40 in [address] [place] [address] or 7/14/2023 3:40 in [place].  C/B for availability and to schedule

## 2023-07-14 ENCOUNTER — OFFICE VISIT (OUTPATIENT)
Dept: OBGYN CLINIC | Facility: CLINIC | Age: 68
End: 2023-07-14
Payer: MEDICARE

## 2023-07-14 VITALS
DIASTOLIC BLOOD PRESSURE: 70 MMHG | WEIGHT: 186.2 LBS | HEIGHT: 63 IN | BODY MASS INDEX: 32.99 KG/M2 | SYSTOLIC BLOOD PRESSURE: 126 MMHG

## 2023-07-14 DIAGNOSIS — Z80.3 FAMILY HISTORY OF BREAST CANCER IN MOTHER: ICD-10-CM

## 2023-07-14 DIAGNOSIS — Z12.31 BREAST CANCER SCREENING BY MAMMOGRAM: ICD-10-CM

## 2023-07-14 DIAGNOSIS — N81.6 RECTOCELE: ICD-10-CM

## 2023-07-14 DIAGNOSIS — N95.2 VAGINAL ATROPHY: ICD-10-CM

## 2023-07-14 DIAGNOSIS — N81.3 UTERINE PROCIDENTIA: Primary | ICD-10-CM

## 2023-07-14 PROCEDURE — 99213 OFFICE O/P EST LOW 20 MIN: CPT | Performed by: STUDENT IN AN ORGANIZED HEALTH CARE EDUCATION/TRAINING PROGRAM

## 2023-07-14 PROCEDURE — 57160 INSERT PESSARY/OTHER DEVICE: CPT | Performed by: STUDENT IN AN ORGANIZED HEALTH CARE EDUCATION/TRAINING PROGRAM

## 2023-07-14 NOTE — PROGRESS NOTES
[address] [place] [address], [address], [facility], [address] [facility],[address]W    Assessment/Plan:  1. Uterine procidentia  Assessment & Plan:  - Patient's current size 5 ring with support removed today. A new size 7 ring with support was placed. No erosion son exam.   - Continue Trimo-Gu twice weekly and vaginal estrogen 1-2 times weekly. She does not need refill today, but will call if refill needed prior to next visit. - Return to office in 2-3 months for annual exam and pessary check. 2. Vaginal atrophy    3. Family history of breast cancer in mother  -     Mammo screening bilateral w 3d & cad; Future    4. Breast cancer screening by mammogram  -     Mammo screening bilateral w 3d & cad; Future    Pessary    Date/Time: 7/14/2023 3:40 PM    Performed by: Matilde Smith MD  Authorized by: Matilde Smith MD  Universal Protocol:  Consent: Verbal consent obtained. Risks and benefits: risks, benefits and alternatives were discussed  Consent given by: patient  Patient understanding: patient states understanding of the procedure being performed  Patient consent: the patient's understanding of the procedure matches consent given  Required items: required blood products, implants, devices, and special equipment available  Patient identity confirmed: verbally with patient      Indication:     Indication for pessary: uterine prolapse and rectocele    Pre-procedure:     Pessary procedure type: Insertion  Problems:     Pessary complications: none    Procedure:     Pessary type:  Ring w/ support    Pessary size:  7    Patient tolerance of procedure: Tolerated well, no immediate complications        Subjective:   Claudia Tran is a 69 y.o. [id number] . CC:   Chief Complaint   Patient presents with   • Procedure     Pessary insertion        HPI: Patient presents for insertion of new pessary. She is without complaint today. Discharge improved following removal of donut pessary. She is continuing Trimo-Gu twice weekly. Denies vaginal bleeding, discharge, irritation, urinary symptoms, or pain. Bulge symptoms only partially alleviated with size 5 ring with support. ROS: Negative except as noted in HPI    No LMP recorded (lmp unknown). Patient is postmenopausal.       She  reports that she is not currently sexually active. She reports using the following method of birth control/protection: Post-menopausal.       The following portions of the patient's history were reviewed and updated as appropriate:   Past Medical History:   Diagnosis Date   • Obesity    • Papanicolaou smear 01/27/2020    neg   • Rectocele    • Uterine prolapse 2020     Past Surgical History:   Procedure Laterality Date   • DILATION AND EVACUATION  1980    x7   • HERNIA REPAIR  2013   • NEUROPLASTY / TRANSPOSITION MEDIAN NERVE AT CARPAL TUNNEL  2012     Family History   Problem Relation Age of Onset   • Stroke Father    • Heart disease Father    • Diabetes Father    • Hypertension Maternal Grandmother    • Hypertension Paternal Grandmother    • Breast cancer Neg Hx    • Ovarian cancer Neg Hx    • Colon cancer Neg Hx    • Uterine cancer Neg Hx      Social History     Socioeconomic History   • Marital status: /Civil Union     Spouse name: None   • Number of children: 1   • Years of education: None   • Highest education level: None   Occupational History   • None   Tobacco Use   • Smoking status: Never   • Smokeless tobacco: Never   Vaping Use   • Vaping Use: Never used   Substance and Sexual Activity   • Alcohol use:  Yes   • Drug use: Never   • Sexual activity: Not Currently     Birth control/protection: Post-menopausal   Other Topics Concern   • None   Social History Narrative    Exercises occasionally     Social Determinants of Health     Financial Resource Strain: Low Risk  (11/18/2022)    Overall Financial Resource Strain (CARDIA)    • Difficulty of Paying Living Expenses: Not hard at all   Food Insecurity: Not on file   Transportation Needs: No Transportation Needs (11/18/2022)    PRAPARE - Transportation    • Lack of Transportation (Medical): No    • Lack of Transportation (Non-Medical): No   Physical Activity: Not on file   Stress: Not on file   Social Connections: Unknown (11/5/2020)    Social Connection and Isolation Panel [NHANES]    • Frequency of Communication with Friends and Family: Not on file    • Frequency of Social Gatherings with Friends and Family: Not on file    • Attends Alevism Services: Not on file    • Active Member of Clubs or Organizations: Not on file    • Attends Club or Organization Meetings: Not on file    • Marital Status:    Intimate Partner Violence: Not At Risk (11/16/2021)    Humiliation, Afraid, Rape, and Kick questionnaire    • Fear of Current or Ex-Partner: No    • Emotionally Abused: No    • Physically Abused: No    • Sexually Abused: No   Housing Stability: Not on file     Outpatient Medications Marked as Taking for the 7/14/23 encounter (Office Visit) with Zulma Lal MD   Medication   • Calcium Carb-Cholecalciferol 600-800 MG-UNIT TABS   • estradiol (ESTRACE) 0.1 mg/g vaginal cream   • ibuprofen (MOTRIN) 200 mg tablet   • Oxyquinoline-Sod Lauryl Sulf (Trimo-Gu) 0.025-0.01 % GEL     Allergies   Allergen Reactions   • Naproxen Anaphylaxis   • Amphotericin B Rash   • Ampicillin Rash   • Penicillins Rash           Objective:  /70 (BP Location: Left arm, Patient Position: Sitting, Cuff Size: Large)   Ht 5' 2.5" (1.588 m)   Wt 84.5 kg (186 lb 3.2 oz)   LMP  (LMP Unknown)   BMI 33.51 kg/m²        Chaperone present? Yes: Kit Worrell MA. General Appearance: alert and oriented, in no acute distress. Abdomen: Soft, non-tender, non-distended, no masses, no rebound or guarding. Pelvic:       External genitalia: Normal appearance, no abnormal pigmentation, no lesions or masses. Normal Bartholin's and [place]'s. Urinary system: Urethral meatus normal, bladder non-tender.       Vaginal: Stable stage III-IV prolapse of posterior and apical compartments. No erosions or bleeding noted. Cervix: Normal-appearing, well-epithelialized, no gross lesions or masses. No cervical motion tenderness. Adnexa: No adnexal masses or tenderness noted. Uterus: Normal-sized, regular contour, midline, mobile, no uterine tenderness. Extremities: Normal range of motion.    Skin: normal, no rash or abnormalities  Neurologic: alert, oriented x3  Psychiatric: Appropriate affect, mood stable, cooperative with exam.        Marsha Kevin MD  7/14/2023 3:59 PM

## 2023-07-14 NOTE — ASSESSMENT & PLAN NOTE
- Patient's current size 5 ring with support removed today. A new size 7 ring with support was placed. No erosion son exam.   - Continue Trimo-Gu twice weekly and vaginal estrogen 1-2 times weekly. She does not need refill today, but will call if refill needed prior to next visit. - Return to office in 2-3 months for annual exam and pessary check.

## 2023-08-03 DIAGNOSIS — Z12.31 BREAST CANCER SCREENING BY MAMMOGRAM: ICD-10-CM

## 2023-08-03 DIAGNOSIS — Z80.3 FAMILY HISTORY OF BREAST CANCER IN MOTHER: ICD-10-CM

## 2023-09-25 ENCOUNTER — HOSPITAL ENCOUNTER (OUTPATIENT)
Dept: NON INVASIVE DIAGNOSTICS | Facility: HOSPITAL | Age: 68
Discharge: HOME | End: 2023-09-25
Attending: INTERNAL MEDICINE
Payer: MEDICARE

## 2023-09-25 DIAGNOSIS — I49.3 FREQUENT PVCS: ICD-10-CM

## 2023-09-25 PROCEDURE — 93225 XTRNL ECG REC<48 HRS REC: CPT

## 2023-09-25 PROCEDURE — 93226 XTRNL ECG REC<48 HR SCAN A/R: CPT

## 2023-09-29 ENCOUNTER — ANNUAL EXAM (OUTPATIENT)
Dept: OBGYN CLINIC | Facility: CLINIC | Age: 68
End: 2023-09-29

## 2023-09-29 VITALS
HEIGHT: 63 IN | SYSTOLIC BLOOD PRESSURE: 132 MMHG | WEIGHT: 185 LBS | BODY MASS INDEX: 32.78 KG/M2 | DIASTOLIC BLOOD PRESSURE: 82 MMHG

## 2023-09-29 DIAGNOSIS — Z96.0 VAGINAL PESSARY IN SITU: ICD-10-CM

## 2023-09-29 DIAGNOSIS — N81.3 UTERINE PROCIDENTIA: ICD-10-CM

## 2023-09-29 DIAGNOSIS — N95.2 VAGINAL ATROPHY: ICD-10-CM

## 2023-09-29 DIAGNOSIS — Z12.31 ENCOUNTER FOR SCREENING MAMMOGRAM FOR MALIGNANT NEOPLASM OF BREAST: ICD-10-CM

## 2023-09-29 DIAGNOSIS — Z01.419 ENCOUNTER FOR ANNUAL ROUTINE GYNECOLOGICAL EXAMINATION: Primary | ICD-10-CM

## 2023-09-29 RX ORDER — OXYQUINOLINE SULFATE AND SODIUM LAURYL SULFATE .25; .1 MG/G; MG/G
2 JELLY VAGINAL 3 TIMES WEEKLY
Qty: 113.4 G | Refills: 1 | Status: SHIPPED | OUTPATIENT
Start: 2023-09-29

## 2023-09-29 NOTE — PROGRESS NOTES
[address] [place] [address], [address], [facility], [address] [facility],[address]    ASSESSMENT/PLAN: Lilia Tolbert is a 69 y.o. [id number] who presents for annual gynecologic exam.    Encounter for routine gynecologic examination  - Routine well woman exam completed today. - Cervical Cancer Screening complete.   - STI screening offered including HIV testing: offered, pt declined  - Breast Cancer Screening: Last Mammogram 07/18/2023.   - Colorectal cancer screening was not ordered, as she has follow up planned with Melony HORNER.   - Osteoporosis screening normal 2021. - The following were reviewed in today's visit: breast self exam, adequate intake of calcium and vitamin D, exercise and healthy diet    Additional problems addressed during this visit:  1. Encounter for annual routine gynecological examination    2. Vaginal pessary in situ  Assessment & Plan:  - Pessary removed, cleaned, and replaced. - Continue trimo-gunter twice weekly for management of pessary-related discharge. Rx refilled. Orders:  -     Oxyquinoline-Sod Lauryl Sulf (Trimo-Gunter) 0.025-0.01 % GEL; Insert 2 g into the vagina 3 (three) times a week  -     Pessary    3. Encounter for screening mammogram for malignant neoplasm of breast  -     Mammo screening bilateral w 3d & cad; Future    4. Vaginal atrophy  Assessment & Plan:  - Continue estrace cream twice weekly. 5. Uterine procidentia  Pessary    Date/Time: 9/29/2023 9:20 AM    Performed by: Rosales Miner MD  Authorized by: Rosales Miner MD  Universal Protocol:  Procedure performed by:  Consent: Verbal consent obtained.   Risks and benefits: risks, benefits and alternatives were discussed  Consent given by: patient  Patient understanding: patient states understanding of the procedure being performed  Required items: required blood products, implants, devices, and special equipment available  Patient identity confirmed: verbally with patient      Indication:     Indication for pessary: uterine prolapse    Pre-procedure:     Pessary procedure type:  Cleaning/check  Problems:     Pessary complications: none    Procedure:     Pessary type:  Ring w/ support    Pessary size:  7    Patient tolerance of procedure: Tolerated well, no immediate complications          CC:  Annual Gynecologic Examination    HPI: Mica Ty is a 69 y.o. [id number] who presents for annual gynecologic examination. She is without complaint today. ROS: Negative except as noted in HPI    No LMP recorded (lmp unknown). Patient is postmenopausal.       She  reports that she is not currently sexually active. She reports using the following method of birth control/protection: Post-menopausal.       The following portions of the patient's history were reviewed and updated as appropriate:  Past Medical History:   Diagnosis Date   • Obesity    • Papanicolaou smear 01/27/2020    neg   • Rectocele    • Uterine prolapse 2020     Past Surgical History:   Procedure Laterality Date   • DILATION AND EVACUATION  1980    x7   • HERNIA REPAIR  2013   • NEUROPLASTY / TRANSPOSITION MEDIAN NERVE AT CARPAL TUNNEL  2012     Family History   Problem Relation Age of Onset   • Breast cancer Mother 80   • Stroke Father    • Heart disease Father    • Diabetes Father    • Hypertension Maternal Grandmother    • Hypertension Paternal Grandmother    • Ovarian cancer Neg Hx    • Colon cancer Neg Hx    • Uterine cancer Neg Hx      Social History     Socioeconomic History   • Marital status: /Civil Union     Spouse name: None   • Number of children: 1   • Years of education: None   • Highest education level: None   Occupational History   • None   Tobacco Use   • Smoking status: Never     Passive exposure: Never   • Smokeless tobacco: Never   Vaping Use   • Vaping Use: Never used   Substance and Sexual Activity   • Alcohol use:  Yes   • Drug use: Never   • Sexual activity: Not Currently     Birth control/protection: Post-menopausal   Other Topics Concern • None   Social History Narrative    Exercises occasionally     Social Determinants of Health     Financial Resource Strain: Low Risk  (11/18/2022)    Overall Financial Resource Strain (CARDIA)    • Difficulty of Paying Living Expenses: Not hard at all   Food Insecurity: Not on file   Transportation Needs: No Transportation Needs (11/18/2022)    PRAPARE - Transportation    • Lack of Transportation (Medical): No    • Lack of Transportation (Non-Medical): No   Physical Activity: Not on file   Stress: Not on file   Social Connections: Unknown (11/5/2020)    Social Connection and Isolation Panel [NHANES]    • Frequency of Communication with Friends and Family: Not on file    • Frequency of Social Gatherings with Friends and Family: Not on file    • Attends Confucianist Services: Not on file    • Active Member of Clubs or Organizations: Not on file    • Attends Club or Organization Meetings: Not on file    • Marital Status:    Intimate Partner Violence: Not At Risk (11/16/2021)    Humiliation, Afraid, Rape, and Kick questionnaire    • Fear of Current or Ex-Partner: No    • Emotionally Abused: No    • Physically Abused: No    • Sexually Abused: No   Housing Stability: Not on file     Outpatient Medications Marked as Taking for the 9/29/23 encounter (Annual Exam) with Raghavendra Leon MD   Medication   • Calcium Carb-Cholecalciferol 600-800 MG-UNIT TABS   • estradiol (ESTRACE) 0.1 mg/g vaginal cream   • ibuprofen (MOTRIN) 200 mg tablet   • Oxyquinoline-Sod Lauryl Sulf (Trimo-Gu) 0.025-0.01 % GEL   • [DISCONTINUED] Oxyquinoline-Sod Lauryl Sulf (Trimo-Gu) 0.025-0.01 % GEL     Allergies   Allergen Reactions   • Naproxen Anaphylaxis   • Amphotericin B Rash   • Ampicillin Rash   • Penicillins Rash           Objective:  /82 (BP Location: Left arm, Patient Position: Sitting, Cuff Size: Standard)   Ht 5' 2.5" (1.588 m)   Wt 83.9 kg (185 lb)   LMP  (LMP Unknown)   BMI 33.30 kg/m²        Chaperone present?  Yes: Trini Ng MA. General Appearance: alert and oriented, in no acute distress. Neck/Thyroid: No thyromegaly, no thyroid nodules. Respiratory: Unlabored breathing. Cardiovascular: Regular rate, no peripheral edema. Abdomen: Soft, non-tender, non-distended, no masses, no rebound or guarding. Breast Exam: No dimpling, nipple retraction or discharge. No lumps or masses. No axillary or supraclavicular nodes. Pelvic:       External genitalia: Normal appearance, no abnormal pigmentation, no lesions or masses. Normal Bartholin's and [place]'s. Urinary system: Urethral meatus normal, bladder non-tender. Vaginal: atrophic mucosa. Stable stage III-IV prolapse of posterior and apical compartments. Superficial mucosal abrasion noted at left posterior fornix. Normal-appearing physiologic discharge. Cervix: Normal-appearing, well-epithelialized, no gross lesions or masses. No cervical motion tenderness. Adnexa: No adnexal masses or tenderness noted. Uterus: Normal-sized, regular contour, midline, mobile, no uterine tenderness. Extremities: Normal range of motion. Warm, well-perfused, non-tender.    Skin: normal, no rash or abnormalities  Neurologic: alert, oriented x3  Psychiatric: Appropriate affect, mood stable, cooperative with exam.    Tana Mustafa MD  9/29/2023 9:59 AM

## 2023-09-29 NOTE — ASSESSMENT & PLAN NOTE
- Pessary removed, cleaned, and replaced. - Continue trimo-gunter twice weekly for management of pessary-related discharge. Rx refilled.

## 2023-10-04 PROCEDURE — 93227 XTRNL ECG REC<48 HR R&I: CPT | Performed by: INTERNAL MEDICINE

## 2023-10-06 NOTE — RESULT ENCOUNTER NOTE
Notified patient of holter monitor, she verbalized understanding .  Also advise to make f/u appt with Dr. Marcelo Cunningham

## 2023-10-24 ENCOUNTER — TELEPHONE (OUTPATIENT)
Dept: OBGYN CLINIC | Facility: CLINIC | Age: 68
End: 2023-10-24

## 2023-10-30 NOTE — TELEPHONE ENCOUNTER
Sandy staples/segundo she has questions about the estradiol. Return call to [facility], saw Dr. Patrick Coats end of September. She mentioned she has some light bleeding intermittently She has been using the estsrogen for several years. She is now concerned because she is having red blood spotting intermittently on pad maybe three times per week and on tube after insertion of cream. When she was in in September she was having daily spotting. She recently in the last two months started having cramping as well and breast tenderness. She has been reading about estrogen and is wondering if she also needs progesterone or if she should be continuing with cream.  Or if above symptoms are related to using estrogen. She has been using the 2mg twice weekly since onset of use.   Dr. Patrick Coats, please advise

## 2023-10-30 NOTE — TELEPHONE ENCOUNTER
Per Dr. Lexus Elizondo, schedule for eval. May or may not need EMB. Have patient take ibuprofen 600 one hour prior in the event he decides to complete the EMB. Schedule as non-urgent.

## 2023-10-30 NOTE — TELEPHONE ENCOUNTER
Please schedule patient for pelvic exam to assess bleeding. Does not need progesterone in addition to vaginal estrogen cream (progesterone supplementation only necessary if estradiol given orally or transdermally), but any bleeding should be evaluated further.      Michael Torres MD  10/30/2023 11:35 AM

## 2023-10-31 NOTE — TELEPHONE ENCOUNTER
Spoke with Roger Henry, reviewed Dr Cerrato More recommendation for evaluation first. He will determine at time of visit if EMB is indicated. Recommended premed with Ibuprofen 600 one hr prior in the event EMB is necessary. Patient in agreement, transferred to reception to schedule.

## 2023-11-13 ENCOUNTER — RA CDI HCC (OUTPATIENT)
Dept: OTHER | Facility: HOSPITAL | Age: 68
End: 2023-11-13

## 2023-11-20 ENCOUNTER — OFFICE VISIT (OUTPATIENT)
Dept: FAMILY MEDICINE CLINIC | Facility: CLINIC | Age: 68
End: 2023-11-20
Payer: MEDICARE

## 2023-11-20 VITALS
SYSTOLIC BLOOD PRESSURE: 128 MMHG | DIASTOLIC BLOOD PRESSURE: 78 MMHG | OXYGEN SATURATION: 98 % | HEART RATE: 73 BPM | BODY MASS INDEX: 32.11 KG/M2 | TEMPERATURE: 98.5 F | WEIGHT: 181.2 LBS | HEIGHT: 63 IN | RESPIRATION RATE: 16 BRPM

## 2023-11-20 DIAGNOSIS — Z00.00 MEDICARE ANNUAL WELLNESS VISIT, SUBSEQUENT: Primary | ICD-10-CM

## 2023-11-20 DIAGNOSIS — M21.42 PES PLANUS OF LEFT FOOT: ICD-10-CM

## 2023-11-20 DIAGNOSIS — M19.071 ARTHRITIS OF RIGHT ANKLE: ICD-10-CM

## 2023-11-20 DIAGNOSIS — Z13.1 SCREENING FOR DIABETES MELLITUS: ICD-10-CM

## 2023-11-20 DIAGNOSIS — M76.822 POSTERIOR TIBIAL TENDON DYSFUNCTION (PTTD) OF LEFT LOWER EXTREMITY: ICD-10-CM

## 2023-11-20 DIAGNOSIS — Z13.6 SCREENING FOR CARDIOVASCULAR CONDITION: ICD-10-CM

## 2023-11-20 DIAGNOSIS — E66.9 OBESITY (BMI 30.0-34.9): ICD-10-CM

## 2023-11-20 DIAGNOSIS — Z23 IMMUNIZATION DUE: ICD-10-CM

## 2023-11-20 DIAGNOSIS — Z12.11 SCREENING FOR COLORECTAL CANCER: ICD-10-CM

## 2023-11-20 DIAGNOSIS — R00.2 PALPITATIONS: ICD-10-CM

## 2023-11-20 DIAGNOSIS — Z12.12 SCREENING FOR COLORECTAL CANCER: ICD-10-CM

## 2023-11-20 PROCEDURE — G0439 PPPS, SUBSEQ VISIT: HCPCS | Performed by: FAMILY MEDICINE

## 2023-11-20 RX ORDER — ZOSTER VACCINE RECOMBINANT, ADJUVANTED 50 MCG/0.5
0.5 KIT INTRAMUSCULAR ONCE
Qty: 1 EACH | Refills: 1 | Status: SHIPPED | OUTPATIENT
Start: 2023-11-20 | End: 2023-11-20

## 2023-11-20 NOTE — PATIENT INSTRUCTIONS
Medicare Preventive Visit Patient Instructions  Thank you for completing your Welcome to Medicare Visit or Medicare Annual Wellness Visit today. Your next wellness visit will be due in one year (11/20/2024). The screening/preventive services that you may require over the next 5-10 years are detailed below. Some tests may not apply to you based off risk factors and/or age. Screening tests ordered at today's visit but not completed yet may show as past due. Also, please note that scanned in results may not display below. Preventive Screenings:  Service Recommendations Previous Testing/Comments   Colorectal Cancer Screening  * Colonoscopy    * Fecal Occult Blood Test (FOBT)/Fecal Immunochemical Test (FIT)  * Fecal DNA/Cologuard Test  * Flexible Sigmoidoscopy Age: 43-73 years old   Colonoscopy: every 10 years (may be performed more frequently if at higher risk)  OR  FOBT/FIT: every 1 year  OR  Cologuard: every 3 years  OR  Sigmoidoscopy: every 5 years  Screening may be recommended earlier than age 39 if at higher risk for colorectal cancer. Also, an individualized decision between you and your healthcare provider will decide whether screening between the ages of 77-80 would be appropriate. Colonoscopy: 01/15/2013  FOBT/FIT: Not on file  Cologuard: Not on file  Sigmoidoscopy: Not on file          Breast Cancer Screening Age: 36 years old  Frequency: every 1-2 years  Not required if history of left and right mastectomy Mammogram: 07/18/2023        Cervical Cancer Screening Between the ages of 21-29, pap smear recommended once every 3 years. Between the ages of 32-69, can perform pap smear with HPV co-testing every 5 years.    Recommendations may differ for women with a history of total hysterectomy, cervical cancer, or abnormal pap smears in past. Pap Smear: 09/29/2023        Hepatitis C Screening Once for adults born between 1945 and 1965  More frequently in patients at high risk for Hepatitis C Hep C Antibody: 11/17/2020        Diabetes Screening 1-2 times per year if you're at risk for diabetes or have pre-diabetes Fasting glucose: No results in last 5 years (No results in last 5 years)  A1C: No results in last 5 years (No results in last 5 years)      Cholesterol Screening Once every 5 years if you don't have a lipid disorder. May order more often based on risk factors. Lipid panel: 11/30/2022          Other Preventive Screenings Covered by Medicare:  Abdominal Aortic Aneurysm (AAA) Screening: covered once if your at risk. You're considered to be at risk if you have a family history of AAA. Lung Cancer Screening: covers low dose CT scan once per year if you meet all of the following conditions: (1) Age 48-67; (2) No signs or symptoms of lung cancer; (3) Current smoker or have quit smoking within the last 15 years; (4) You have a tobacco smoking history of at least 20 pack years (packs per day multiplied by number of years you smoked); (5) You get a written order from a healthcare provider. Glaucoma Screening: covered annually if you're considered high risk: (1) You have diabetes OR (2) Family history of glaucoma OR (3)  aged 48 and older OR (3)  American aged 72 and older  Osteoporosis Screening: covered every 2 years if you meet one of the following conditions: (1) You're estrogen deficient and at risk for osteoporosis based off medical history and other findings; (2) Have a vertebral abnormality; (3) On glucocorticoid therapy for more than 3 months; (4) Have primary hyperparathyroidism; (5) On osteoporosis medications and need to assess response to drug therapy. Last bone density test (DXA Scan): 05/19/2021. HIV Screening: covered annually if you're between the age of 14-79. Also covered annually if you are younger than 13 and older than 72 with risk factors for HIV infection. For pregnant patients, it is covered up to 3 times per pregnancy.     Immunizations:  Immunization Recommendations Influenza Vaccine Annual influenza vaccination during flu season is recommended for all persons aged >= 6 months who do not have contraindications   Pneumococcal Vaccine   * Pneumococcal conjugate vaccine = PCV13 (Prevnar 13), PCV15 (Vaxneuvance), PCV20 (Prevnar 20)  * Pneumococcal polysaccharide vaccine = PPSV23 (Pneumovax) Adults 24-13 yo with certain risk factors or if 69+ yo  If never received any pneumonia vaccine: recommend Prevnar 20 (PCV20)  Give PCV20 if previously received 1 dose of PCV13 or PPSV23   Hepatitis B Vaccine 3 dose series if at intermediate or high risk (ex: diabetes, end stage renal disease, liver disease)   Respiratory syncytial virus (RSV) Vaccine - COVERED BY MEDICARE PART D  * RSVPreF3 (Arexvy) CDC recommends that adults 61years of age and older may receive a single dose of RSV vaccine using shared clinical decision-making (SCDM)   Tetanus (Td) Vaccine - COST NOT COVERED BY MEDICARE PART B Following completion of primary series, a booster dose should be given every 10 years to maintain immunity against tetanus. Td may also be given as tetanus wound prophylaxis. Tdap Vaccine - COST NOT COVERED BY MEDICARE PART B Recommended at least once for all adults. For pregnant patients, recommended with each pregnancy. Shingles Vaccine (Shingrix) - COST NOT COVERED BY MEDICARE PART B  2 shot series recommended in those 19 years and older who have or will have weakened immune systems or those 50 years and older     Health Maintenance Due:      Topic Date Due   • Colorectal Cancer Screening  01/15/2023   • Cervical Cancer Screening  01/27/2023   • Breast Cancer Screening: Mammogram  07/18/2024   • DXA SCAN  05/19/2026   • Hepatitis C Screening  Completed     Immunizations Due:      Topic Date Due   • COVID-19 Vaccine (4 - Pfizer series) 10/14/2022   • Influenza Vaccine (1) 09/01/2023     Advance Directives   What are advance directives?   Advance directives are legal documents that state your wishes and plans for medical care. These plans are made ahead of time in case you lose your ability to make decisions for yourself. Advance directives can apply to any medical decision, such as the treatments you want, and if you want to donate organs. What are the types of advance directives? There are many types of advance directives, and each state has rules about how to use them. You may choose a combination of any of the following:  Living will: This is a written record of the treatment you want. You can also choose which treatments you do not want, which to limit, and which to stop at a certain time. This includes surgery, medicine, IV fluid, and tube feedings. Durable power of  for [facility]): This is a written record that states who you want to make healthcare choices for you when you are unable to make them for yourself. This person, called a proxy, is usually a family member or a friend. You may choose more than 1 proxy. Do not resuscitate (DNR) order:  A DNR order is used in case your heart stops beating or you stop breathing. It is a request not to have certain forms of treatment, such as CPR. A DNR order may be included in other types of advance directives. Medical directive: This covers the care that you want if you are in a coma, near death, or unable to make decisions for yourself. You can list the treatments you want for each condition. Treatment may include pain medicine, surgery, blood transfusions, dialysis, IV or tube feedings, and a ventilator (breathing machine). Values history: This document has questions about your views, beliefs, and how you feel and think about life. This information can help others choose the care that you would choose. Why are advance directives important? An advance directive helps you control your care. Although spoken wishes may be used, it is better to have your wishes written down. Spoken wishes can be misunderstood, or not followed. Treatments may be given even if you do not want them. An advance directive may make it easier for your family to make difficult choices about your care. Weight Management   Why it is important to manage your weight:  Being overweight increases your risk of health conditions such as heart disease, high blood pressure, type 2 diabetes, and certain types of cancer. It can also increase your risk for osteoarthritis, sleep apnea, and other respiratory problems. Aim for a slow, steady weight loss. Even a small amount of weight loss can lower your risk of health problems. How to lose weight safely:  A safe and healthy way to lose weight is to eat fewer calories and get regular exercise. You can lose up about 1 pound a week by decreasing the number of calories you eat by 500 calories each day. Healthy meal plan for weight management:  A healthy meal plan includes a variety of foods, contains fewer calories, and helps you stay healthy. A healthy meal plan includes the following:  Eat whole-grain foods more often. A healthy meal plan should contain fiber. Fiber is the part of grains, fruits, and vegetables that is not broken down by your body. Whole-grain foods are healthy and provide extra fiber in your diet. Some examples of whole-grain foods are whole-wheat breads and pastas, oatmeal, brown rice, and bulgur. Eat a variety of vegetables every day. Include dark, leafy greens such as spinach, kale, qi greens, and mustard greens. Eat yellow and orange vegetables such as carrots, sweet potatoes, and winter squash. Eat a variety of fruits every day. Choose fresh or canned fruit (canned in its own juice or light syrup) instead of juice. Fruit juice has very little or no fiber. Eat low-fat dairy foods. Drink fat-free (skim) milk or 1% milk. Eat fat-free yogurt and low-fat cottage cheese. Try low-fat cheeses such as mozzarella and other reduced-fat cheeses. Choose meat and other protein foods that are low in fat. Choose beans or other legumes such as split peas or lentils. Choose fish, skinless poultry (chicken or turkey), or lean cuts of red meat (beef or pork). Before you cook meat or poultry, cut off any visible fat. Use less fat and oil. Try baking foods instead of frying them. Add less fat, such as margarine, sour cream, regular salad dressing and mayonnaise to foods. Eat fewer high-fat foods. Some examples of high-fat foods include french fries, doughnuts, ice cream, and cakes. Eat fewer sweets. Limit foods and drinks that are high in sugar. This includes candy, cookies, regular soda, and sweetened drinks. Exercise:  Exercise at least 30 minutes per day on most days of the week. Some examples of exercise include walking, biking, dancing, and swimming. You can also fit in more physical activity by taking the stairs instead of the elevator or parking farther away from stores. Ask your healthcare provider about the best exercise plan for you. © Copyright [employer] 2018 Information is for End User's use only and may not be sold, redistributed or otherwise used for commercial purposes.  All illustrations and images included in CareNotes® are the copyrighted property of [employer] or  [employer]

## 2023-11-20 NOTE — PROGRESS NOTES
Assessment and Plan:     Problem List Items Addressed This Visit          Musculoskeletal and Integument    Posterior tibial tendon dysfunction (PTTD) of left lower extremity    Relevant Orders    Ambulatory Referral to Physical Therapy    Arthritis of right ankle    Relevant Orders    Ambulatory Referral to Physical Therapy       Other    Obesity (BMI 30.0-34. 9)     BMI Counseling: Body mass index is 32.61 kg/m². The BMI is above normal. Nutrition recommendations include reducing portion sizes, decreasing overall calorie intake, and 3-5 servings of fruits/vegetables daily. Exercise recommendations include exercising 3-5 times per week. Relevant Orders    Lipid panel    Comprehensive metabolic panel    CBC and differential    TSH, 3rd generation with Free T4 reflex    UA (URINE) with reflex to Scope    Palpitations     Following with cardiology          Relevant Orders    Lipid panel    Comprehensive metabolic panel    CBC and differential    TSH, 3rd generation with Free T4 reflex    UA (URINE) with reflex to Scope    Pes planus of left foot    Relevant Orders    Ambulatory Referral to Physical Therapy     Other Visit Diagnoses       Medicare annual wellness visit, subsequent    -  Primary    Relevant Orders    Lipid panel    Comprehensive metabolic panel    CBC and differential    TSH, 3rd generation with Free T4 reflex    UA (URINE) with reflex to Scope    Immunization due        Relevant Medications    Zoster Vac Recomb Adjuvanted (Shingrix) 48 MCG/0.5ML SUSR    Screening for diabetes mellitus        Relevant Orders    Lipid panel    Comprehensive metabolic panel    Screening for cardiovascular condition        Relevant Orders    Lipid panel    Comprehensive metabolic panel    Screening for colorectal cancer        Relevant Orders    Cologuard            Depression Screening and Follow-up Plan: Patient was screened for depression during today's encounter.  They screened negative with a PHQ-2 score of 0.      Preventive health issues were discussed with patient, and age appropriate screening tests were ordered as noted in patient's After Visit Summary. Personalized health advice and appropriate referrals for health education or preventive services given if needed, as noted in patient's After Visit Summary. History of Present Illness:     Patient presents for a Medicare Wellness Visit    HPI   Patient Care Team:  Hill Post DO as PCP - General (Family Medicine)     Review of Systems:     Review of Systems   Constitutional:  Negative for chills, fatigue and fever. HENT:  Negative for congestion, postnasal drip, rhinorrhea and sinus pressure. Eyes:  Negative for photophobia and visual disturbance. Respiratory:  Negative for cough and shortness of breath. Cardiovascular:  Negative for chest pain, palpitations and leg swelling. Gastrointestinal:  Negative for abdominal pain, constipation, diarrhea, nausea and vomiting. Genitourinary:  Negative for difficulty urinating and dysuria. Musculoskeletal:  Negative for arthralgias and myalgias. Skin:  Negative for color change and rash. Neurological:  Negative for dizziness, weakness, light-headedness and headaches. Problem List:     Patient Active Problem List   Diagnosis    Allergic rhinitis    Diverticulosis    Internal hemorrhoids    Vaginal atrophy    Uterine procidentia    Vaginal pessary in situ    Obesity (BMI 30.0-34. 9)    Palpitations    Acute vaginitis    Posterior tibial tendon dysfunction (PTTD) of left lower extremity    Pes planus of left foot    Arthritis of right ankle      Past Medical and Surgical History:     Past Medical History:   Diagnosis Date    Obesity     Papanicolaou smear 01/27/2020    neg    Rectocele     Uterine prolapse 2020     Past Surgical History:   Procedure Laterality Date    DILATION AND EVACUATION  1980    x7    HERNIA REPAIR  2013    NEUROPLASTY / TRANSPOSITION MEDIAN NERVE AT CARPAL TUNNEL 2012      Family History:     Family History   Problem Relation Age of Onset    Breast cancer Mother 80    Stroke Father     Heart disease Father     Diabetes Father     Hypertension Maternal Grandmother     Hypertension Paternal Grandmother     Ovarian cancer Neg Hx     Colon cancer Neg Hx     Uterine cancer Neg Hx       Social History:     Social History     Socioeconomic History    Marital status: /Civil Union     Spouse name: None    Number of children: 1    Years of education: None    Highest education level: None   Occupational History    None   Tobacco Use    Smoking status: Never     Passive exposure: Never    Smokeless tobacco: Never   Vaping Use    Vaping Use: Never used   Substance and Sexual Activity    Alcohol use: Yes    Drug use: Never    Sexual activity: Not Currently     Birth control/protection: Post-menopausal   Other Topics Concern    None   Social History Narrative    Exercises occasionally     Social Determinants of Health     Financial Resource Strain: Low Risk  (11/18/2022)    Overall Financial Resource Strain (CARDIA)     Difficulty of Paying Living Expenses: Not hard at all   Food Insecurity: Not on file   Transportation Needs: No Transportation Needs (11/18/2022)    PRAPARE - Transportation     Lack of Transportation (Medical): No     Lack of Transportation (Non-Medical):  No   Physical Activity: Not on file   Stress: Not on file   Social Connections: Unknown (11/5/2020)    Social Connection and Isolation Panel [NHANES]     Frequency of Communication with Friends and Family: Not on file     Frequency of Social Gatherings with Friends and Family: Not on file     Attends [employer] Services: Not on file     Active Member of Clubs or Organizations: Not on file     Attends Club or Organization Meetings: Not on file     Marital Status:    Intimate Partner Violence: Not At Risk (11/20/2023)    Humiliation, Afraid, Rape, and Kick questionnaire     Fear of Current or Ex-Partner: No Emotionally Abused: No     Physically Abused: No     Sexually Abused: No   Housing Stability: Not on file      Medications and Allergies:     Current Outpatient Medications   Medication Sig Dispense Refill    Calcium Carb-Cholecalciferol 600-800 MG-UNIT TABS Take 1 tablet by mouth 2 (two) times a day      estradiol (ESTRACE) 0.1 mg/g vaginal cream Insert 1 g into the vagina 2 (two) times a week Daily x 2 weeks, then 2-3 times weekly. Use lowest frequency necessary to alleviate symptoms 42.5 g 1    ibuprofen (MOTRIN) 200 mg tablet Take 200 mg by mouth every 6 (six) hours as needed for mild pain      Zoster Vac Recomb Adjuvanted (Shingrix) 50 MCG/0.5ML SUSR Inject 0.5 mL into a muscle once for 1 dose Repeat dose in 2 to 6 months 1 each 1    Oxyquinoline-Sod Lauryl Sulf (Trimo-Gu) 0.025-0.01 % GEL Insert 2 g into the vagina 3 (three) times a week (Patient not taking: Reported on 11/20/2023) 113.4 g 1     No current facility-administered medications for this visit.      Allergies   Allergen Reactions    Naproxen Anaphylaxis    Amphotericin B Rash    Ampicillin Rash    Penicillins Rash      Immunizations:     Immunization History   Administered Date(s) Administered    COVID-19 PFIZER VACCINE 0.3 ML IM 03/18/2021, 04/12/2021    COVID-19 Pfizer vac (Conrad-sucrose, gray cap) 12 yr+ IM 08/19/2022    INFLUENZA 10/19/2007, 10/31/2008    Influenza, high dose seasonal 0.7 mL 11/05/2020, 11/16/2021, 11/18/2022    Influenza, seasonal, injectable 10/27/2014    Pneumococcal Conjugate 13-Valent 11/05/2020    Pneumococcal Polysaccharide PPV23 10/27/2014, 11/16/2021    Tdap 05/27/2009, 12/03/2020      Health Maintenance:         Topic Date Due    Colorectal Cancer Screening  01/15/2023    Cervical Cancer Screening  11/20/2024 (Originally 1/27/2023)    Breast Cancer Screening: Mammogram  07/18/2024    DXA SCAN  05/19/2026    Hepatitis C Screening  Completed         Topic Date Due    COVID-19 Vaccine (4 - Pfizer series) 10/14/2022 Influenza Vaccine (1) 09/01/2023      Medicare Screening Tests and Risk Assessments:     Frank Arboleda is here for her Subsequent Wellness visit. Last Medicare Wellness visit information reviewed, patient interviewed, no change since last AWV. Health Risk Assessment:   Patient rates overall health as good. Patient feels that their physical health rating is same. Patient is satisfied with their life. Eyesight was rated as slightly worse. Hearing was rated as same. Patient feels that their emotional and mental health rating is same. Patients states they are never, rarely angry. Patient states they are never, rarely unusually tired/fatigued. Pain experienced in the last 7 days has been none. Patient states that she has experienced no weight loss or gain in last 6 months. Depression Screening:   PHQ-2 Score: 0      Fall Risk Screening: In the past year, patient has experienced: no history of falling in past year      Urinary Incontinence Screening:   Patient has not leaked urine accidently in the last six months. Home Safety:  Patient does not have trouble with stairs inside or outside of their home. Patient has working smoke alarms and has working carbon monoxide detector. Home safety hazards include: none. Nutrition:   Current diet is Regular. Medications:   Patient is not currently taking any over-the-counter supplements. Patient is able to manage medications. Activities of Daily Living (ADLs)/Instrumental Activities of Daily Living (IADLs):   Walk and transfer into and out of bed and chair?: Yes  Dress and groom yourself?: Yes    Bathe or shower yourself?: Yes    Feed yourself?  Yes  Do your laundry/housekeeping?: Yes  Manage your money, pay your bills and track your expenses?: Yes  Make your own meals?: Yes    Do your own shopping?: Yes    Previous Hospitalizations:   Any hospitalizations or ED visits within the last 12 months?: No      Advance Care Planning:   Living will: No    Advanced directive: Yes    Advanced directive counseling given: Yes      Cognitive Screening:   Provider or family/friend/caregiver concerned regarding cognition?: No    PREVENTIVE SCREENINGS      Cardiovascular Screening:    General: Screening Not Indicated, History Lipid Disorder and Risks and Benefits Discussed    Due for: Lipid Panel      Diabetes Screening:     General: Screening Current and Risks and Benefits Discussed    Due for: Blood Glucose      Colorectal Cancer Screening:     General: Risks and Benefits Discussed    Due for: Cologuard      Breast Cancer Screening:     General: Screening Current      Cervical Cancer Screening:    General: Screening Not Indicated      Osteoporosis Screening:    General: Screening Current      Lung Cancer Screening:     General: Screening Not Indicated      Hepatitis C Screening:    General: Screening Current    Screening, Brief Intervention, and Referral to Treatment (SBIRT)    Screening  Typical number of drinks in a day: 0  Typical number of drinks in a week: 0  Interpretation: Low risk drinking behavior. Single Item Drug Screening:  How often have you used an illegal drug (including marijuana) or a prescription medication for non-medical reasons in the past year? never    Single Item Drug Screen Score: 0  Interpretation: Negative screen for possible drug use disorder    No results found. Physical Exam:     /78 (BP Location: Right arm, Patient Position: Sitting, Cuff Size: Large)   Pulse 73   Temp 98.5 °F (36.9 °C) (Tympanic)   Resp 16   Ht 5' 2.5" (1.588 m)   Wt 82.2 kg (181 lb 3.2 oz)   LMP  (LMP Unknown)   SpO2 98%   BMI 32.61 kg/m²     Physical Exam  Constitutional:       General: She is not in acute distress. Appearance: Normal appearance. She is not ill-appearing, toxic-appearing or diaphoretic. HENT:      Head: Normocephalic and atraumatic.       Right Ear: Tympanic membrane and ear canal normal.      Left Ear: Tympanic membrane and ear canal normal.      Nose: Nose normal. No congestion. Mouth/Throat:      Mouth: Mucous membranes are moist.      Pharynx: Oropharynx is clear. No oropharyngeal exudate. Eyes:      Extraocular Movements: Extraocular movements intact. Conjunctiva/sclera: Conjunctivae normal.      Pupils: Pupils are equal, round, and reactive to light. Cardiovascular:      Rate and Rhythm: Normal rate and regular rhythm. Pulses: Normal pulses. Heart sounds: No murmur heard. Pulmonary:      Effort: Pulmonary effort is normal.      Breath sounds: Normal breath sounds. No wheezing, rhonchi or rales. Abdominal:      General: Bowel sounds are normal. There is no distension. Palpations: Abdomen is soft. Tenderness: There is no abdominal tenderness. Musculoskeletal:         General: No swelling or tenderness. Normal range of motion. Cervical back: Normal range of motion and neck supple. Skin:     General: Skin is warm and dry. Capillary Refill: Capillary refill takes less than 2 seconds. Neurological:      General: No focal deficit present. Mental Status: She is alert and oriented to person, place, and time. Cranial Nerves: No cranial nerve deficit. Psychiatric:         Mood and Affect: Mood normal.         Behavior: Behavior normal.         Thought Content:  Thought content normal.          Kristine Britton DO

## 2023-11-20 NOTE — ASSESSMENT & PLAN NOTE
BMI Counseling: Body mass index is 32.61 kg/m². The BMI is above normal. Nutrition recommendations include reducing portion sizes, decreasing overall calorie intake, and 3-5 servings of fruits/vegetables daily. Exercise recommendations include exercising 3-5 times per week.

## 2023-12-01 ENCOUNTER — OFFICE VISIT (OUTPATIENT)
Dept: OBGYN CLINIC | Facility: CLINIC | Age: 68
End: 2023-12-01

## 2023-12-01 VITALS
SYSTOLIC BLOOD PRESSURE: 126 MMHG | HEIGHT: 63 IN | BODY MASS INDEX: 32.04 KG/M2 | WEIGHT: 180.8 LBS | DIASTOLIC BLOOD PRESSURE: 74 MMHG

## 2023-12-01 DIAGNOSIS — N95.0 POSTMENOPAUSAL BLEEDING: ICD-10-CM

## 2023-12-01 DIAGNOSIS — N95.2 VAGINAL ATROPHY: ICD-10-CM

## 2023-12-01 DIAGNOSIS — Z96.0 VAGINAL PESSARY IN SITU: ICD-10-CM

## 2023-12-01 DIAGNOSIS — N76.0 ACUTE VAGINITIS: Primary | ICD-10-CM

## 2023-12-01 DIAGNOSIS — N81.3 UTERINE PROCIDENTIA: ICD-10-CM

## 2023-12-01 RX ORDER — OXYQUINOLINE SULFATE AND SODIUM LAURYL SULFATE .25; .1 MG/G; MG/G
2 JELLY VAGINAL 3 TIMES WEEKLY
Qty: 113.4 G | Refills: 1 | Status: SHIPPED | OUTPATIENT
Start: 2023-12-01

## 2023-12-01 RX ORDER — FLUCONAZOLE 150 MG/1
150 TABLET ORAL EVERY OTHER DAY
Qty: 5 TABLET | Refills: 0 | Status: SHIPPED | OUTPATIENT
Start: 2023-12-01 | End: 2023-12-10

## 2023-12-01 RX ORDER — ESTRADIOL 0.1 MG/G
1 CREAM VAGINAL 2 TIMES WEEKLY
Qty: 42.5 G | Refills: 1 | Status: SHIPPED | OUTPATIENT
Start: 2023-12-04

## 2023-12-01 RX ORDER — METRONIDAZOLE 500 MG/1
500 TABLET ORAL EVERY 12 HOURS SCHEDULED
Qty: 14 TABLET | Refills: 0 | Status: SHIPPED | OUTPATIENT
Start: 2023-12-01 | End: 2023-12-08

## 2023-12-01 NOTE — PROGRESS NOTES
[address] [place] [address], [address], [facility], [address] [facility],[address]    Assessment/Plan:  1. Acute vaginitis  Assessment & Plan:  - Foul-smelling, curd-like discharge noted on exam today. Pessary removed and cleaned but not replaced. - Will treat with metronidazole 500 mg PO BID x 7 days as well as fluconazole 150 mg PO every-other day x 5 doses. - Continue Trimo-Gu twice weekly. Orders:  -     fluconazole (DIFLUCAN) 150 mg tablet; Take 1 tablet (150 mg total) by mouth every other day for 5 doses  -     metroNIDAZOLE (FLAGYL) 500 mg tablet; Take 1 tablet (500 mg total) by mouth every 12 (twelve) hours for 7 days    2. Postmenopausal bleeding  Assessment & Plan:  - No bleeding noted on exam. Erythematous mucosa at fornix. Suspect mucosal source in setting of vaginitis. However, recommend pelvic ultrasound for assessment of endometrial stripe. Patient may need uterine sampling. Orders:  -     US pelvis complete w transvaginal; Future; Expected date: 12/01/2023    3. Uterine procidentia    4. Vaginal atrophy  Assessment & Plan:  - Continue estrace cream twice weekly. Orders:  -     estradiol (ESTRACE) 0.1 mg/g vaginal cream; Insert 1 g into the vagina 2 (two) times a week Daily x 2 weeks, then 2-3 times weekly. Use lowest frequency necessary to alleviate symptoms    5. Vaginal pessary in situ  Assessment & Plan:  - Will leave pessary out while undergoing treatment for vaginitis and while obtaining pelvic ultrasound. Return to office for re-insertion in 1 week. Orders:  -     Oxyquinoline-Sod Lauryl Sulf (Trimo-Gu) 0.025-0.01 % GEL; Insert 2 g into the vagina 3 (three) times a week        Subjective:   Bertha Hernandez is a 69 y.o. [id number] . CC:   Chief Complaint   Patient presents with    Vaginal Bleeding     Pt states she's been bleeding/spotting  on an off since Nov 19 , Also needs refills on both gels     HPI: Patient presents for evaluation of persistent vaginal spotting since November. She denies heavy bleeding. Has not noted discharge or odor. ROS: Negative except as noted in HPI    No LMP recorded (lmp unknown). Patient is postmenopausal.       She  reports that she is not currently sexually active.  She reports using the following method of birth control/protection: Post-menopausal.       The following portions of the patient's history were reviewed and updated as appropriate:   Past Medical History:   Diagnosis Date    Obesity     Papanicolaou smear 01/27/2020    neg    Rectocele     Uterine prolapse 2020     Past Surgical History:   Procedure Laterality Date    DILATION AND EVACUATION  1980    x7    HERNIA REPAIR  2013    NEUROPLASTY / TRANSPOSITION MEDIAN NERVE AT CARPAL TUNNEL  2012     Family History   Problem Relation Age of Onset    Breast cancer Mother 80    Stroke Father     Heart disease Father     Diabetes Father     Hypertension Maternal Grandmother     Hypertension Paternal Grandmother     Ovarian cancer Neg Hx     Colon cancer Neg Hx     Uterine cancer Neg Hx      Social History     Socioeconomic History    Marital status: /Civil Union     Spouse name: Not on file    Number of children: 1    Years of education: Not on file    Highest education level: Not on file   Occupational History    Not on file   Tobacco Use    Smoking status: Never     Passive exposure: Never    Smokeless tobacco: Never   Vaping Use    Vaping Use: Never used   Substance and Sexual Activity    Alcohol use: Yes    Drug use: Never    Sexual activity: Not Currently     Birth control/protection: Post-menopausal   Other Topics Concern    Not on file   Social History Narrative    Exercises occasionally     Social Determinants of Health     Financial Resource Strain: Low Risk  (11/18/2022)    Overall Financial Resource Strain (CARDIA)     Difficulty of Paying Living Expenses: Not hard at all   Food Insecurity: Not on file   Transportation Needs: No Transportation Needs (11/18/2022)    Nevaeh Vázquez Transportation     Lack of Transportation (Medical): No     Lack of Transportation (Non-Medical): No   Physical Activity: Not on file   Stress: Not on file   Social Connections: Unknown (11/5/2020)    Social Connection and Isolation Panel [NHANES]     Frequency of Communication with Friends and Family: Not on file     Frequency of Social Gatherings with Friends and Family: Not on file     Attends [employer] Services: Not on file     Active Member of Clubs or Organizations: Not on file     Attends Club or Organization Meetings: Not on file     Marital Status:    Intimate Partner Violence: Not At Risk (11/20/2023)    Humiliation, Afraid, Rape, and Kick questionnaire     Fear of Current or Ex-Partner: No     Emotionally Abused: No     Physically Abused: No     Sexually Abused: No   Housing Stability: Not on file     Outpatient Medications Marked as Taking for the 12/1/23 encounter (Office Visit) with Yared Nielson MD   Medication    Calcium Carb-Cholecalciferol 600-800 MG-UNIT TABS    [START ON 12/4/2023] estradiol (ESTRACE) 0.1 mg/g vaginal cream    fluconazole (DIFLUCAN) 150 mg tablet    ibuprofen (MOTRIN) 200 mg tablet    metroNIDAZOLE (FLAGYL) 500 mg tablet    Oxyquinoline-Sod Lauryl Sulf (Trimo-Gu) 0.025-0.01 % GEL    [DISCONTINUED] estradiol (ESTRACE) 0.1 mg/g vaginal cream    [DISCONTINUED] Oxyquinoline-Sod Lauryl Sulf (Trimo-Gu) 0.025-0.01 % GEL     Allergies   Allergen Reactions    Naproxen Anaphylaxis    Amphotericin B Rash    Ampicillin Rash    Penicillins Rash           Objective:  /74 (BP Location: Left arm, Patient Position: Sitting, Cuff Size: Standard)   Ht 5' 2.5" (1.588 m)   Wt 82 kg (180 lb 12.8 oz)   LMP  (LMP Unknown)   Breastfeeding No   BMI 32.54 kg/m²        Chaperone present? Yes: Antonio Gabriel MA. General Appearance: alert and oriented, in no acute distress. Abdomen: Soft, non-tender, non-distended, no masses, no rebound or guarding.   Pelvic:       External genitalia: Normal appearance, no abnormal pigmentation, no lesions or masses. Normal Bartholin's and Notus's. Urinary system: Urethral meatus normal, bladder non-tender. Vaginal: atrophic mucosa. Complete procidentia. Erythema at fornices. No overt erosion. No bleeding noted. Thick, curd-like, malodorous discharge noted. Cervix: No masses. No cervical motion tenderness. Adnexa: No adnexal masses or tenderness noted. Uterus: Normal-sized, regular contour, midline, mobile, no uterine tenderness. Extremities: Normal range of motion. Skin: normal, no rash or abnormalities  Neurologic: alert, oriented x3  Psychiatric: Appropriate affect, mood stable, cooperative with exam.        Luanne Tuttle MD  12/1/2023 5:04 PM  ----------------------------------------  Pessary    Date/Time: 12/1/2023 11:20 AM    Performed by: Angelique Wang MD  Authorized by: Angelique Wang MD  Universal Protocol:  Consent: Verbal consent obtained. Consent given by: patient  Patient understanding: patient states understanding of the procedure being performed  Required items: required blood products, implants, devices, and special equipment available    Indication:     Indication for pessary: uterine prolapse    Pre-procedure:     Pessary procedure type:  Cleaning/check  Problems:     Pessary complications: bleeding, foul odor and vaginal discharge    Procedure:     Pessary type:  Ring w/ support    Pessary size:  7    Patient tolerance of procedure:   Tolerated well, no immediate complications

## 2023-12-01 NOTE — ASSESSMENT & PLAN NOTE
- Will leave pessary out while undergoing treatment for vaginitis and while obtaining pelvic ultrasound. Return to office for re-insertion in 1 week.

## 2023-12-01 NOTE — ASSESSMENT & PLAN NOTE
- Foul-smelling, curd-like discharge noted on exam today. Pessary removed and cleaned but not replaced. - Will treat with metronidazole 500 mg PO BID x 7 days as well as fluconazole 150 mg PO every-other day x 5 doses. - Continue Trimo-Gu twice weekly.

## 2023-12-01 NOTE — ASSESSMENT & PLAN NOTE
- No bleeding noted on exam. Erythematous mucosa at fornix. Suspect mucosal source in setting of vaginitis. However, recommend pelvic ultrasound for assessment of endometrial stripe. Patient may need uterine sampling.

## 2023-12-03 ENCOUNTER — HOSPITAL ENCOUNTER (OUTPATIENT)
Dept: RADIOLOGY | Facility: HOSPITAL | Age: 68
Discharge: HOME | End: 2023-12-03
Attending: STUDENT IN AN ORGANIZED HEALTH CARE EDUCATION/TRAINING PROGRAM
Payer: MEDICARE

## 2023-12-03 DIAGNOSIS — N95.0 POSTMENOPAUSAL BLEEDING: ICD-10-CM

## 2023-12-03 PROCEDURE — 76856 US EXAM PELVIC COMPLETE: CPT

## 2023-12-03 PROCEDURE — 76830 TRANSVAGINAL US NON-OB: CPT

## 2023-12-04 ENCOUNTER — TELEPHONE (OUTPATIENT)
Dept: OBGYN CLINIC | Facility: CLINIC | Age: 68
End: 2023-12-04

## 2023-12-04 NOTE — TELEPHONE ENCOUNTER
Elenita Gary had Pelvic U/S testing done yesterday at [facility] location. She did have some bleeding & passing blood clots post U/S testing;none today. Elenita Gary is having difficulty doing everyday activities due to prolapse. Elenita Gary is requesting to have Pessary reinserted prior to Wed. 12/08 appt. . message sent to Dr. Briana Hubbard for assistance in scheduling an appointment.

## 2023-12-06 ENCOUNTER — OFFICE VISIT (OUTPATIENT)
Dept: OBGYN CLINIC | Facility: CLINIC | Age: 68
End: 2023-12-06
Payer: MEDICARE

## 2023-12-06 VITALS
WEIGHT: 179.8 LBS | SYSTOLIC BLOOD PRESSURE: 130 MMHG | DIASTOLIC BLOOD PRESSURE: 70 MMHG | HEIGHT: 63 IN | BODY MASS INDEX: 31.86 KG/M2

## 2023-12-06 DIAGNOSIS — N81.3 UTERINE PROCIDENTIA: ICD-10-CM

## 2023-12-06 DIAGNOSIS — N76.0 ACUTE VAGINITIS: ICD-10-CM

## 2023-12-06 DIAGNOSIS — N95.0 POSTMENOPAUSAL BLEEDING: Primary | ICD-10-CM

## 2023-12-06 PROCEDURE — 99213 OFFICE O/P EST LOW 20 MIN: CPT | Performed by: STUDENT IN AN ORGANIZED HEALTH CARE EDUCATION/TRAINING PROGRAM

## 2023-12-06 NOTE — PROGRESS NOTES
Pre-Operative History & Physical  [facility] - [address] [place] [address] [address] [facility], [facility], [address] [facility],[address]W    Assessment/Plan:  Anisa Jackson is a 77 y.o. [id number] with recurrent postmenopausal bleeding.    - Plan for pelvic exam under anesthesia, dilation and curettage, and all other indicated procedures. Surgical consents reviewed and signed with patient today. Risks of surgery were reviewed, including bleeding, infection, damage to surrounding organs/structures (specifically bowel, bladder, vessels, nerves, ureters), uterine perforation, scar tissue formation, and need for further surgery. All questions answered to the patient's apparent satisfaction. Patient agrees to proceed with surgery as discussed. - PATs: T&S. (CBC and BMP ordered by PCP and will be completed this week. Recent EKG from 3/2023 is on file). - Antibiotic prophylaxis: None indicated. - VTE ppx: SCDs. 1. Postmenopausal bleeding  Assessment & Plan:  - Pelvic ultrasound completed, but report not yet available for review. Regardless of results, I recommend endometrial sampling due to persistent/recurrent bleeding.   - No identifiable etiology of her bleeding on exam today. However, exam is limited by patient discomfort and redundant vaginal tissue obscuring visualization of upper vagina. I therefore recommend exam under anesthesia for further evaluation and endometrial sampling. 2. Acute vaginitis  Assessment & Plan:  - Discharge improved. Continue oral flagyl and diflucan. - Pessary re-inserted today, given patient discomfort with prolapse. 3. Uterine procidentia      ____________________________________    Subjective:   Anisa Jackson is a 77 y.o. [id number] . CC:   Chief Complaint   Patient presents with    Pessary Check     Re-insert pessary        HPI: Patient presents for re-insertion of pessary. Has been taking Flagyl and Diflucan as prescribed for acute vaginitis. Had ultrasound performed this week.  Patient states that prior to ultrasound, she had sudden onset of bright red blood per vagina. Patient digitally reduced prolapse and bleeding resolved. She denies vaginal discharge or current bleeding.      ROS: Negative except as noted in HPI    The following portions of the patient's history were reviewed and updated as appropriate:   Past Medical History:   Diagnosis Date    Obesity     Papanicolaou smear 01/27/2020    neg    Rectocele     Uterine prolapse 2020     Past Surgical History:   Procedure Laterality Date    DILATION AND EVACUATION  1980    x7    HERNIA REPAIR  2013    MAMMO (HISTORICAL) Bilateral 01/30/2020    [facility] BI-RADS 2 Benign findings Scattered area fibroglandular density; 25% to 50% glandular breast tissue    NEUROPLASTY / TRANSPOSITION MEDIAN NERVE AT CARPAL TUNNEL  2012     Family History   Problem Relation Age of Onset    Breast cancer Mother 80    Stroke Father     Heart disease Father     Diabetes Father     Hypertension Maternal Grandmother     Hypertension Paternal Grandmother     Ovarian cancer Neg Hx     Colon cancer Neg Hx     Uterine cancer Neg Hx      Social History     Socioeconomic History    Marital status: /Civil Union     Spouse name: None    Number of children: 1    Years of education: None    Highest education level: None   Occupational History    None   Tobacco Use    Smoking status: Never     Passive exposure: Never    Smokeless tobacco: Never   Vaping Use    Vaping Use: Never used   Substance and Sexual Activity    Alcohol use: Yes    Drug use: Never    Sexual activity: Not Currently     Birth control/protection: Post-menopausal   Other Topics Concern    None   Social History Narrative    Exercises occasionally     Social Determinants of Health     Financial Resource Strain: Low Risk  (11/18/2022)    Overall Financial Resource Strain (CARDIA)     Difficulty of Paying Living Expenses: Not hard at all   Food Insecurity: Not on file   Transportation Needs: No Transportation Needs (11/18/2022)    PRAPARE - Transportation     Lack of Transportation (Medical): No     Lack of Transportation (Non-Medical): No   Physical Activity: Not on file   Stress: Not on file   Social Connections: Unknown (11/5/2020)    Social Connection and Isolation Panel [NHANES]     Frequency of Communication with Friends and Family: Not on file     Frequency of Social Gatherings with Friends and Family: Not on file     Attends Orthodoxy Services: Not on file     Active Member of Clubs or Organizations: Not on file     Attends Club or Organization Meetings: Not on file     Marital Status:    Intimate Partner Violence: Not At Risk (11/20/2023)    Humiliation, Afraid, Rape, and Kick questionnaire     Fear of Current or Ex-Partner: No     Emotionally Abused: No     Physically Abused: No     Sexually Abused: No   Housing Stability: Not on file     Outpatient Medications Marked as Taking for the 12/6/23 encounter (Office Visit) with Sarah Cerrato MD   Medication    Calcium Carb-Cholecalciferol 600-800 MG-UNIT TABS    estradiol (ESTRACE) 0.1 mg/g vaginal cream    fluconazole (DIFLUCAN) 150 mg tablet    ibuprofen (MOTRIN) 200 mg tablet    metroNIDAZOLE (FLAGYL) 500 mg tablet    Oxyquinoline-Sod Lauryl Sulf (Trimo-Gu) 0.025-0.01 % GEL     Allergies   Allergen Reactions    Naproxen Anaphylaxis    Amphotericin B Rash    Ampicillin Rash    Penicillins Rash           Objective:  /70 (BP Location: Left arm, Patient Position: Sitting, Cuff Size: Large)   Ht 5' 2.5" (1.588 m)   Wt 81.6 kg (179 lb 12.8 oz)   LMP  (LMP Unknown)   BMI 32.36 kg/m²        Chaperone present? Yes: Meera Maza MA. General Appearance: alert and oriented, in no acute distress. HEENT: NC/AT, EOMI  CVS: Regular rate and rhythm  Lungs: Normal work of breathing  Abdomen: Soft, non-tender, non-distended, no masses, no rebound or guarding. Pelvic:       External genitalia: Normal appearance, no abnormal pigmentation, no lesions or masses. Normal Bartholin's and [place]. Urinary system: Urethral meatus normal, bladder non-tender. Vaginal: Complete procidentia. Normal-appearing physiologic discharge. No overt evidence of erosion. Cervix: Normal-appearing, well-epithelialized, no gross lesions or masses. No cervical motion tenderness. Adnexa: No adnexal masses or tenderness noted. Uterus: Normal-sized, regular contour, midline, mobile, no uterine tenderness. Extremities: Normal range of motion.    Skin: normal, no rash or abnormalities  Neurologic: alert, oriented x3  Psychiatric: Appropriate affect, mood stable, cooperative with exam.        Murphy Grubbs MD  12/6/2023 2:32 PM

## 2023-12-06 NOTE — PRE-PROCEDURE INSTRUCTIONS
Pre-Surgery Instructions:   Medication Instructions    Calcium Carb-Cholecalciferol 600-800 MG-UNIT TABS Stop taking 7 days prior to surgery. Chlorphen-PE-Acetaminophen (CORICIDIN D COLD/FLU/SINUS PO) Ok to use up to and including night before surgery ( takes at night)    estradiol (ESTRACE) 0.1 mg/g vaginal cream Hold day of surgery. fluconazole (DIFLUCAN) 150 mg tablet Pt is currently taking - will be completed prior to surgery -will complete as of 12/9/23     ibuprofen (MOTRIN) 200 mg tablet Stop taking 3 days prior to surgery. metroNIDAZOLE (FLAGYL) 500 mg tablet Pt is currently taking - will complete prior to surgery - last dose to be 12/8/23 per pt     Oxyquinoline-Sod Lauryl Sulf (Trimo-Gu) 0.025-0.01 % GEL Hold day of surgery. Pt instructed on using antibacterial dial soap - instructions reviewed with pt for showering both  night before and DOS - pt verbalized understanding of these instructions. Pt instructed no razor blade shaving between now and DOS- ok to use electric shaver up till 24 hrs prior to DOS - NO shaving on 12/11/23 or 12/12/23. Pt instructed no Hair of body products to be placed on skin for DOS    Pt instructed if with any health status changes between now and DOS - report to surgeon office     Medication instructions for day surgery reviewed. Please use only a sip of water to take your instructed medications. Avoid all over the counter vitamins, supplements and NSAIDS for one week prior to surgery per anesthesia guidelines. Tylenol is ok to take as needed. You will receive a call one business day prior to surgery with an arrival time and hospital directions. If your surgery is scheduled on a Monday, the hospital will be calling you on the Friday prior to your surgery. If you have not heard from anyone by 8pm, please call the hospital supervisor through the hospital  at [phone]. Trinh Anderson [phone]).     Do not eat or drink anything after midnight the night before your surgery, including candy, mints, lifesavers, or chewing gum. Do not drink alcohol 24hrs before your surgery. Try not to smoke at least 24hrs before your surgery. Follow the pre surgery showering instructions as listed in the [facility] Surgical Experience Booklet” or otherwise provided by your surgeon's office. Do not use a blade to shave the surgical area 1 week before surgery. It is okay to use a clean electric clippers up to 24 hours before surgery. Do not apply any lotions, creams, including makeup, cologne, deodorant, or perfumes after showering on the day of your surgery. Do not use dry shampoo, hair spray, hair gel, or any type of hair products. No contact lenses, eye make-up, or artificial eyelashes. Remove nail polish, including gel polish, and any artificial, gel, or acrylic nails if possible. Remove all jewelry including rings and body piercing jewelry. Wear causal clothing that is easy to take on and off. Consider your type of surgery. Keep any valuables, jewelry, piercings at home. Please bring any specially ordered equipment (sling, braces) if indicated. Arrange for a responsible person to drive you to and from the hospital on the day of your surgery. Visitor Guidelines discussed. Call the surgeon's office with any new illnesses, exposures, or additional questions prior to surgery. Please reference your [facility] Surgical Experience Booklet” for additional information to prepare for your upcoming surgery.

## 2023-12-06 NOTE — ASSESSMENT & PLAN NOTE
- Discharge improved. Continue oral flagyl and diflucan. - Pessary re-inserted today, given patient discomfort with prolapse.

## 2023-12-06 NOTE — ASSESSMENT & PLAN NOTE
- Pelvic ultrasound completed, but report not yet available for review. Regardless of results, I recommend endometrial sampling due to persistent/recurrent bleeding.   - No identifiable etiology of her bleeding on exam today. However, exam is limited by patient discomfort and redundant vaginal tissue obscuring visualization of upper vagina. I therefore recommend exam under anesthesia for further evaluation and endometrial sampling.

## 2023-12-06 NOTE — H&P (VIEW-ONLY)
Pre-Operative History & Physical  [facility] - [address] [place] [address] [address] [facility] [address], [facility], [address] [facility],[address]    Assessment/Plan:  Ashia Tong is a 77 y.o. [id number] with recurrent postmenopausal bleeding.    - Plan for pelvic exam under anesthesia, dilation and curettage, and all other indicated procedures. Surgical consents reviewed and signed with patient today. Risks of surgery were reviewed, including bleeding, infection, damage to surrounding organs/structures (specifically bowel, bladder, vessels, nerves, ureters), uterine perforation, scar tissue formation, and need for further surgery. All questions answered to the patient's apparent satisfaction. Patient agrees to proceed with surgery as discussed. - PATs: T&S. (CBC and BMP ordered by PCP and will be completed this week. Recent EKG from 3/2023 is on file). - Antibiotic prophylaxis: None indicated. - VTE ppx: SCDs. 1. Postmenopausal bleeding  Assessment & Plan:  - Pelvic ultrasound completed, but report not yet available for review. Regardless of results, I recommend endometrial sampling due to persistent/recurrent bleeding.   - No identifiable etiology of her bleeding on exam today. However, exam is limited by patient discomfort and redundant vaginal tissue obscuring visualization of upper vagina. I therefore recommend exam under anesthesia for further evaluation and endometrial sampling. 2. Acute vaginitis  Assessment & Plan:  - Discharge improved. Continue oral flagyl and diflucan. - Pessary re-inserted today, given patient discomfort with prolapse. 3. Uterine procidentia      ____________________________________    Subjective:   Ashia Tong is a 77 y.o. [id number] . CC:   Chief Complaint   Patient presents with    Pessary Check     Re-insert pessary        HPI: Patient presents for re-insertion of pessary. Has been taking Flagyl and Diflucan as prescribed for acute vaginitis. Had ultrasound performed this week.  Patient states that prior to ultrasound, she had sudden onset of bright red blood per vagina. Patient digitally reduced prolapse and bleeding resolved. She denies vaginal discharge or current bleeding.      ROS: Negative except as noted in HPI    The following portions of the patient's history were reviewed and updated as appropriate:   Past Medical History:   Diagnosis Date    Obesity     Papanicolaou smear 01/27/2020    neg    Rectocele     Uterine prolapse 2020     Past Surgical History:   Procedure Laterality Date    DILATION AND EVACUATION  1980    x7    HERNIA REPAIR  2013    MAMMO (HISTORICAL) Bilateral 01/30/2020    [facility] BI-RADS 2 Benign findings Scattered area fibroglandular density; 25% to 50% glandular breast tissue    NEUROPLASTY / TRANSPOSITION MEDIAN NERVE AT CARPAL TUNNEL  2012     Family History   Problem Relation Age of Onset    Breast cancer Mother 80    Stroke Father     Heart disease Father     Diabetes Father     Hypertension Maternal Grandmother     Hypertension Paternal Grandmother     Ovarian cancer Neg Hx     Colon cancer Neg Hx     Uterine cancer Neg Hx      Social History     Socioeconomic History    Marital status: /Civil Union     Spouse name: None    Number of children: 1    Years of education: None    Highest education level: None   Occupational History    None   Tobacco Use    Smoking status: Never     Passive exposure: Never    Smokeless tobacco: Never   Vaping Use    Vaping Use: Never used   Substance and Sexual Activity    Alcohol use: Yes    Drug use: Never    Sexual activity: Not Currently     Birth control/protection: Post-menopausal   Other Topics Concern    None   Social History Narrative    Exercises occasionally     Social Determinants of Health     Financial Resource Strain: Low Risk  (11/18/2022)    Overall Financial Resource Strain (CARDIA)     Difficulty of Paying Living Expenses: Not hard at all   Food Insecurity: Not on file   Transportation Needs: No Transportation Needs (11/18/2022)    PRAPARE - Transportation     Lack of Transportation (Medical): No     Lack of Transportation (Non-Medical): No   Physical Activity: Not on file   Stress: Not on file   Social Connections: Unknown (11/5/2020)    Social Connection and Isolation Panel [NHANES]     Frequency of Communication with Friends and Family: Not on file     Frequency of Social Gatherings with Friends and Family: Not on file     Attends Restoration Services: Not on file     Active Member of Clubs or Organizations: Not on file     Attends Club or Organization Meetings: Not on file     Marital Status:    Intimate Partner Violence: Not At Risk (11/20/2023)    Humiliation, Afraid, Rape, and Kick questionnaire     Fear of Current or Ex-Partner: No     Emotionally Abused: No     Physically Abused: No     Sexually Abused: No   Housing Stability: Not on file     Outpatient Medications Marked as Taking for the 12/6/23 encounter (Office Visit) with Guillermo Horton MD   Medication    Calcium Carb-Cholecalciferol 600-800 MG-UNIT TABS    estradiol (ESTRACE) 0.1 mg/g vaginal cream    fluconazole (DIFLUCAN) 150 mg tablet    ibuprofen (MOTRIN) 200 mg tablet    metroNIDAZOLE (FLAGYL) 500 mg tablet    Oxyquinoline-Sod Lauryl Sulf (Trimo-Gu) 0.025-0.01 % GEL     Allergies   Allergen Reactions    Naproxen Anaphylaxis    Amphotericin B Rash    Ampicillin Rash    Penicillins Rash           Objective:  /70 (BP Location: Left arm, Patient Position: Sitting, Cuff Size: Large)   Ht 5' 2.5" (1.588 m)   Wt 81.6 kg (179 lb 12.8 oz)   LMP  (LMP Unknown)   BMI 32.36 kg/m²        Chaperone present? Yes: Sarahi Gaspar MA. General Appearance: alert and oriented, in no acute distress. HEENT: NC/AT, EOMI  CVS: Regular rate and rhythm  Lungs: Normal work of breathing  Abdomen: Soft, non-tender, non-distended, no masses, no rebound or guarding. Pelvic:       External genitalia: Normal appearance, no abnormal pigmentation, no lesions or masses. Normal Bartholin's and [place]'s. Urinary system: Urethral meatus normal, bladder non-tender. Vaginal: Complete procidentia. Normal-appearing physiologic discharge. No overt evidence of erosion. Cervix: Normal-appearing, well-epithelialized, no gross lesions or masses. No cervical motion tenderness. Adnexa: No adnexal masses or tenderness noted. Uterus: Normal-sized, regular contour, midline, mobile, no uterine tenderness. Extremities: Normal range of motion.    Skin: normal, no rash or abnormalities  Neurologic: alert, oriented x3  Psychiatric: Appropriate affect, mood stable, cooperative with exam.        Bronson Hannah MD  12/6/2023 2:32 PM

## 2023-12-07 ENCOUNTER — ANESTHESIA EVENT (OUTPATIENT)
Dept: PERIOP | Facility: HOSPITAL | Age: 68
End: 2023-12-07
Payer: MEDICARE

## 2023-12-07 ENCOUNTER — APPOINTMENT (OUTPATIENT)
Dept: LAB | Facility: HOSPITAL | Age: 68
End: 2023-12-07
Payer: MEDICARE

## 2023-12-07 DIAGNOSIS — Z01.818 ENCOUNTER FOR PREADMISSION TESTING: ICD-10-CM

## 2023-12-07 LAB
ABO GROUP BLD: NORMAL
BLD GP AB SCN SERPL QL: NEGATIVE
RH BLD: POSITIVE
SPECIMEN EXPIRATION DATE: NORMAL

## 2023-12-07 PROCEDURE — 86900 BLOOD TYPING SEROLOGIC ABO: CPT

## 2023-12-07 PROCEDURE — 86901 BLOOD TYPING SEROLOGIC RH(D): CPT

## 2023-12-07 PROCEDURE — 86850 RBC ANTIBODY SCREEN: CPT

## 2023-12-07 PROCEDURE — 36415 COLL VENOUS BLD VENIPUNCTURE: CPT

## 2023-12-09 LAB
ALBUMIN SERPL-MCNC: 4.5 G/DL (ref 3.9–4.9)
ALBUMIN/GLOB SERPL: 2 {RATIO} (ref 1.2–2.2)
ALP SERPL-CCNC: 87 IU/L (ref 44–121)
ALT SERPL-CCNC: 21 IU/L (ref 0–32)
APPEARANCE UR: CLEAR
AST SERPL-CCNC: 18 IU/L (ref 0–40)
BACTERIA URNS QL MICRO: ABNORMAL
BASOPHILS # BLD AUTO: 0 X10E3/UL (ref 0–0.2)
BASOPHILS NFR BLD AUTO: 1 %
BILIRUB SERPL-MCNC: 0.8 MG/DL (ref 0–1.2)
BILIRUB UR QL STRIP: NEGATIVE
BUN SERPL-MCNC: 10 MG/DL (ref 8–27)
BUN/CREAT SERPL: 18 (ref 12–28)
CALCIUM SERPL-MCNC: 9.3 MG/DL (ref 8.7–10.3)
CASTS URNS QL MICRO: ABNORMAL /LPF
CHLORIDE SERPL-SCNC: 101 MMOL/L (ref 96–106)
CHOLEST SERPL-MCNC: 182 MG/DL (ref 100–199)
CHOLEST/HDLC SERPL: 2.9 RATIO (ref 0–4.4)
CO2 SERPL-SCNC: 26 MMOL/L (ref 20–29)
COLOR UR: YELLOW
CREAT SERPL-MCNC: 0.55 MG/DL (ref 0.57–1)
EGFR: 99 ML/MIN/1.73
EOSINOPHIL # BLD AUTO: 0.2 X10E3/UL (ref 0–0.4)
EOSINOPHIL NFR BLD AUTO: 4 %
EPI CELLS #/AREA URNS HPF: ABNORMAL /HPF (ref 0–10)
ERYTHROCYTE [DISTWIDTH] IN BLOOD BY AUTOMATED COUNT: 13.5 % (ref 11.7–15.4)
GLOBULIN SER-MCNC: 2.2 G/DL (ref 1.5–4.5)
GLUCOSE SERPL-MCNC: 95 MG/DL (ref 70–99)
GLUCOSE UR QL: NEGATIVE
HCT VFR BLD AUTO: 43.5 % (ref 34–46.6)
HDLC SERPL-MCNC: 63 MG/DL
HGB BLD-MCNC: 13.8 G/DL (ref 11.1–15.9)
HGB UR QL STRIP: ABNORMAL
IMM GRANULOCYTES # BLD: 0 X10E3/UL (ref 0–0.1)
IMM GRANULOCYTES NFR BLD: 0 %
KETONES UR QL STRIP: NEGATIVE
LDLC SERPL CALC-MCNC: 108 MG/DL (ref 0–99)
LEUKOCYTE ESTERASE UR QL STRIP: ABNORMAL
LYMPHOCYTES # BLD AUTO: 1.2 X10E3/UL (ref 0.7–3.1)
LYMPHOCYTES NFR BLD AUTO: 26 %
MCH RBC QN AUTO: 27.7 PG (ref 26.6–33)
MCHC RBC AUTO-ENTMCNC: 31.7 G/DL (ref 31.5–35.7)
MCV RBC AUTO: 87 FL (ref 79–97)
MICRO URNS: ABNORMAL
MONOCYTES # BLD AUTO: 0.5 X10E3/UL (ref 0.1–0.9)
MONOCYTES NFR BLD AUTO: 10 %
NEUTROPHILS # BLD AUTO: 2.8 X10E3/UL (ref 1.4–7)
NEUTROPHILS NFR BLD AUTO: 59 %
NITRITE UR QL STRIP: NEGATIVE
PH UR STRIP: 6.5 [PH] (ref 5–7.5)
PLATELET # BLD AUTO: 366 X10E3/UL (ref 150–450)
POTASSIUM SERPL-SCNC: 4.6 MMOL/L (ref 3.5–5.2)
PROT SERPL-MCNC: 6.7 G/DL (ref 6–8.5)
PROT UR QL STRIP: NEGATIVE
RBC # BLD AUTO: 4.99 X10E6/UL (ref 3.77–5.28)
RBC #/AREA URNS HPF: ABNORMAL /HPF (ref 0–2)
SL AMB VLDL CHOLESTEROL CALC: 11 MG/DL (ref 5–40)
SODIUM SERPL-SCNC: 139 MMOL/L (ref 134–144)
SP GR UR: 1.01 (ref 1–1.03)
TRIGL SERPL-MCNC: 55 MG/DL (ref 0–149)
TSH SERPL DL<=0.005 MIU/L-ACNC: 3.03 UIU/ML (ref 0.45–4.5)
UROBILINOGEN UR STRIP-ACNC: 0.2 MG/DL (ref 0.2–1)
WBC # BLD AUTO: 4.7 X10E3/UL (ref 3.4–10.8)
WBC #/AREA URNS HPF: ABNORMAL /HPF (ref 0–5)

## 2023-12-11 NOTE — ANESTHESIA PREPROCEDURE EVALUATION
Procedure:  EXAM UNDER ANESTHESIA (EUA) (Cervix)  DILATATION AND CURETTAGE (D&C) (Uterus)    Relevant Problems   MUSCULOSKELETAL   (+) Arthritis of right ankle   12/22   Left Ventricle: Left ventricular cavity size is normal. Wall thickness is normal. Systolic function is normal. Wall motion is normal. Diastolic function is normal.    Tricuspid Valve: There is mild regurgitation. Physical Exam    Airway    Mallampati score: II  TM Distance: >3 FB  Neck ROM: full     Dental   Comment: Multiple caries     Cardiovascular      Pulmonary      Other Findings  post-pubertal.      Anesthesia Plan  ASA Score- 2     Anesthesia Type- IV sedation with anesthesia with ASA Monitors. Additional Monitors:     Airway Plan:            Plan Factors-Exercise tolerance (METS): >4 METS. Chart reviewed. EKG reviewed. Existing labs reviewed. Patient summary reviewed. Patient is not a current smoker. Induction- intravenous. Postoperative Plan-     Informed Consent- Anesthetic plan and risks discussed with patient. I personally reviewed this patient with the CRNA. Discussed and agreed on the Anesthesia Plan with the CRNA. .              Lab Results   Component Value Date    GLUC 95 12/08/2023    ALT 21 12/08/2023    AST 18 12/08/2023    BUN 10 12/08/2023    CALCIUM 9.0 07/20/2016     12/08/2023    CO2 26 12/08/2023    CREATININE 0.55 (L) 12/08/2023    HDL 63 12/08/2023    HCT 43.5 12/08/2023    HGB 13.8 12/08/2023     12/08/2023    K 4.6 12/08/2023     07/20/2016    TRIG 55 12/08/2023    WBC 4.7 12/08/2023

## 2023-12-12 ENCOUNTER — HOSPITAL ENCOUNTER (OUTPATIENT)
Facility: HOSPITAL | Age: 68
Setting detail: OUTPATIENT SURGERY
Discharge: HOME | End: 2023-12-12
Attending: STUDENT IN AN ORGANIZED HEALTH CARE EDUCATION/TRAINING PROGRAM | Admitting: STUDENT IN AN ORGANIZED HEALTH CARE EDUCATION/TRAINING PROGRAM
Payer: MEDICARE

## 2023-12-12 ENCOUNTER — ANESTHESIA (OUTPATIENT)
Dept: PERIOP | Facility: HOSPITAL | Age: 68
End: 2023-12-12
Payer: MEDICARE

## 2023-12-12 VITALS
OXYGEN SATURATION: 98 % | SYSTOLIC BLOOD PRESSURE: 117 MMHG | HEIGHT: 64 IN | TEMPERATURE: 98.6 F | BODY MASS INDEX: 30.32 KG/M2 | RESPIRATION RATE: 21 BRPM | DIASTOLIC BLOOD PRESSURE: 63 MMHG | WEIGHT: 177.6 LBS | HEART RATE: 74 BPM

## 2023-12-12 DIAGNOSIS — N95.0 POSTMENOPAUSAL BLEEDING: ICD-10-CM

## 2023-12-12 DIAGNOSIS — Z96.0 VAGINAL PESSARY IN SITU: ICD-10-CM

## 2023-12-12 PROCEDURE — 58120 DILATION AND CURETTAGE: CPT | Performed by: STUDENT IN AN ORGANIZED HEALTH CARE EDUCATION/TRAINING PROGRAM

## 2023-12-12 PROCEDURE — 88305 TISSUE EXAM BY PATHOLOGIST: CPT | Performed by: PATHOLOGY

## 2023-12-12 RX ORDER — HYDROMORPHONE HCL IN WATER/PF 6 MG/30 ML
0.2 PATIENT CONTROLLED ANALGESIA SYRINGE INTRAVENOUS
Status: DISCONTINUED | OUTPATIENT
Start: 2023-12-12 | End: 2023-12-12 | Stop reason: HOSPADM

## 2023-12-12 RX ORDER — OXYQUINOLINE SULFATE AND SODIUM LAURYL SULFATE .25; .1 MG/G; MG/G
2 JELLY VAGINAL 2 TIMES WEEKLY
Start: 2023-12-14

## 2023-12-12 RX ORDER — ONDANSETRON 2 MG/ML
4 INJECTION INTRAMUSCULAR; INTRAVENOUS ONCE AS NEEDED
Status: DISCONTINUED | OUTPATIENT
Start: 2023-12-12 | End: 2023-12-12 | Stop reason: HOSPADM

## 2023-12-12 RX ORDER — FENTANYL CITRATE 50 UG/ML
INJECTION, SOLUTION INTRAMUSCULAR; INTRAVENOUS AS NEEDED
Status: DISCONTINUED | OUTPATIENT
Start: 2023-12-12 | End: 2023-12-12

## 2023-12-12 RX ORDER — FENTANYL CITRATE/PF 50 MCG/ML
25 SYRINGE (ML) INJECTION
Status: DISCONTINUED | OUTPATIENT
Start: 2023-12-12 | End: 2023-12-12 | Stop reason: HOSPADM

## 2023-12-12 RX ORDER — SODIUM CHLORIDE, SODIUM LACTATE, POTASSIUM CHLORIDE, CALCIUM CHLORIDE 600; 310; 30; 20 MG/100ML; MG/100ML; MG/100ML; MG/100ML
125 INJECTION, SOLUTION INTRAVENOUS CONTINUOUS
Status: DISCONTINUED | OUTPATIENT
Start: 2023-12-12 | End: 2023-12-12 | Stop reason: HOSPADM

## 2023-12-12 RX ORDER — DEXAMETHASONE SODIUM PHOSPHATE 10 MG/ML
INJECTION, SOLUTION INTRAMUSCULAR; INTRAVENOUS AS NEEDED
Status: DISCONTINUED | OUTPATIENT
Start: 2023-12-12 | End: 2023-12-12

## 2023-12-12 RX ORDER — LIDOCAINE HYDROCHLORIDE 10 MG/ML
0.5 INJECTION, SOLUTION EPIDURAL; INFILTRATION; INTRACAUDAL; PERINEURAL ONCE AS NEEDED
Status: DISCONTINUED | OUTPATIENT
Start: 2023-12-12 | End: 2023-12-12 | Stop reason: HOSPADM

## 2023-12-12 RX ORDER — LIDOCAINE HYDROCHLORIDE 10 MG/ML
INJECTION, SOLUTION EPIDURAL; INFILTRATION; INTRACAUDAL; PERINEURAL AS NEEDED
Status: DISCONTINUED | OUTPATIENT
Start: 2023-12-12 | End: 2023-12-12

## 2023-12-12 RX ORDER — ACETAMINOPHEN 325 MG/1
975 TABLET ORAL EVERY 6 HOURS PRN
Status: DISCONTINUED | OUTPATIENT
Start: 2023-12-12 | End: 2023-12-12 | Stop reason: HOSPADM

## 2023-12-12 RX ORDER — PROPOFOL 10 MG/ML
INJECTION, EMULSION INTRAVENOUS CONTINUOUS PRN
Status: DISCONTINUED | OUTPATIENT
Start: 2023-12-12 | End: 2023-12-12

## 2023-12-12 RX ORDER — LIDOCAINE HYDROCHLORIDE 10 MG/ML
INJECTION, SOLUTION EPIDURAL; INFILTRATION; INTRACAUDAL; PERINEURAL AS NEEDED
Status: DISCONTINUED | OUTPATIENT
Start: 2023-12-12 | End: 2023-12-12 | Stop reason: HOSPADM

## 2023-12-12 RX ORDER — ONDANSETRON 2 MG/ML
INJECTION INTRAMUSCULAR; INTRAVENOUS AS NEEDED
Status: DISCONTINUED | OUTPATIENT
Start: 2023-12-12 | End: 2023-12-12

## 2023-12-12 RX ORDER — PROPOFOL 10 MG/ML
INJECTION, EMULSION INTRAVENOUS AS NEEDED
Status: DISCONTINUED | OUTPATIENT
Start: 2023-12-12 | End: 2023-12-12

## 2023-12-12 RX ADMIN — FENTANYL CITRATE 25 MCG: 50 INJECTION, SOLUTION INTRAMUSCULAR; INTRAVENOUS at 14:25

## 2023-12-12 RX ADMIN — LIDOCAINE HYDROCHLORIDE 50 MG: 10 INJECTION, SOLUTION EPIDURAL; INFILTRATION; INTRACAUDAL; PERINEURAL at 13:58

## 2023-12-12 RX ADMIN — PROPOFOL 50 MCG/KG/MIN: 10 INJECTION, EMULSION INTRAVENOUS at 13:59

## 2023-12-12 RX ADMIN — PROPOFOL 70 MG: 10 INJECTION, EMULSION INTRAVENOUS at 13:58

## 2023-12-12 RX ADMIN — ONDANSETRON 4 MG: 2 INJECTION INTRAMUSCULAR; INTRAVENOUS at 14:22

## 2023-12-12 RX ADMIN — DEXAMETHASONE SODIUM PHOSPHATE 10 MG: 10 INJECTION, SOLUTION INTRAMUSCULAR; INTRAVENOUS at 14:16

## 2023-12-12 RX ADMIN — SODIUM CHLORIDE, SODIUM LACTATE, POTASSIUM CHLORIDE, AND CALCIUM CHLORIDE: .6; .31; .03; .02 INJECTION, SOLUTION INTRAVENOUS at 13:40

## 2023-12-12 RX ADMIN — FENTANYL CITRATE 50 MCG: 50 INJECTION, SOLUTION INTRAMUSCULAR; INTRAVENOUS at 14:04

## 2023-12-12 RX ADMIN — FENTANYL CITRATE 25 MCG: 50 INJECTION, SOLUTION INTRAMUSCULAR; INTRAVENOUS at 14:19

## 2023-12-12 NOTE — ANESTHESIA PROCEDURE NOTES
Anesthesia Notable Event  No anethesia notable event occurred.     Date/Time: 12/12/2023 2:30 PM    Performed by: Daryle Minks, MD  Authorized by: Daryle Minks, MD

## 2023-12-12 NOTE — INTERVAL H&P NOTE
H&P reviewed. After examining the patient I find no changes in the patients condition since the H&P had been written.     Vitals:    12/12/23 1337   BP: 134/76   Pulse: 81   Resp: 13   Temp: 98.1 °F (36.7 °C)   SpO2: 99%     Herbert Pool MD  12/12/2023 1:51 PM

## 2023-12-12 NOTE — ANESTHESIA POSTPROCEDURE EVALUATION
Post-Op Assessment Note    CV Status:  Stable  Pain Score: 0    Pain management: adequate       Mental Status:  Alert and awake   Hydration Status:  Euvolemic   PONV Controlled:  Controlled   Airway Patency:  Patent     Post Op Vitals Reviewed: Yes      Staff: CRNA               BP   110/59   Temp   98.6   Pulse  78   Resp   16   SpO2   99%

## 2023-12-12 NOTE — DISCHARGE INSTR - AVS FIRST PAGE
Nabor Cunningham   10/30/2017 2:30 PM   Anticoagulation Therapy Visit    Description:  77 year old male   Provider:  NB ANTI COAG   Department:  [facility]           INR as of 10/30/2017     Today's INR 4.4!      Anticoagulation Summary as of 10/30/2017     INR goal 2.0-3.0   Today's INR 4.4!   Full instructions 10/30: Hold; Otherwise 6 mg on Mon, Wed, Fri; 8 mg all other days   Next INR check 11/6/2017    Indications   Chronic anticoagulation [Z79.01]  Chronic atrial fibrillation (H) [I48.2]  Long-term (current) use of anticoagulants [Z79.01] [Z79.01]         Your next Anticoagulation Clinic appointment(s)     Nov 06, 2017  2:00 PM CST   [facility]   [facility]    [address] [place] [id number] [phone]              Contact Numbers     Please call [phone] to cancel and/or reschedule your appointment.  Please call [phone] with any problems or questions regarding your therapy          October 2017 Details    Sun Mon Tue Wed Thu Fri Sat     1               2               3               4               5               6               7                 8               9               10               11               12               13               14                 15               16               17               18               19               20               21                 22               23               24               25               26               27               28                 29               30      Hold   See details      31      8 mg              Date Details   10/30 This INR check               How to take your warfarin dose     To take:  8 mg Take 4 of the 2 mg tablets.    Hold Do not take your warfarin dose. See the Details table to the right for additional instructions.                November 2017 Details    Sun Mon Tue Wed Thu Fri Sat        1      6 mg         2  [facility] - [place]    Postoperative Instructions: Pelvic Exam Under Anesthesia with Dilation & Curettage    What can I expect after the procedure? Immediately after the procedure, some women may experience some cramping, mild pain, nausea and/or vomiting. Many women can resume normal activities within a day or so. Be sure to follow any instructions from your doctor, no matter how good you’re feeling. A watery and/or bloody discharge following your procedure is normal. You should expect bleeding for the first 1-2 weeks after surgery. Bleeding may be irregular, and may increase with activity. Anticipated post-procedural symptoms  Commonly reported postoperative events include the following:   Cramping/pelvic pain. This cramping will typically be worst on the day of the procedure, but may last for a couple of days. For pain, you may take over-the-counter acetaminophen (Tylenol) and ibuprofen (Motrin/Advil) as needed. Take only as directed on the medication label. Nausea and vomiting. This is common immediately following the procedure and is due to the anesthesia that you received during surgery. Vaginal discharge   Vaginal bleeding/spotting    Call you doctor if you have any questions or concerns. Very few patients experience complications following Pelvic Exam Under Anesthesia with Dilation & Curettage. However, you should call your doctor right away if you develop:   A fever higher than 100.4°F   Worsening pelvic pain that is not relieved by ibuprofen or  other prescribed medicine   Worsening or persistent nausea or vomiting   Chest pain, shortness of breath, or dizziness   Bowel or bladder problems   Foul-smelling vaginal discharge. Discharge with a red, yellow, or brown tinge is normal.   brownish is normal)    Post-operative follow up   Surgical pathology results are typically available in approximately 7-10 days and will be visible to you on [facility]'s MyChart.  These results will be      8 mg         3      6 mg         4      8 mg           5      8 mg         6            7               8               9               10               11                 12               13               14               15               16               17               18                 19               20               21               22               23               24               25                 26               27               28               29               30                  Date Details   No additional details    Date of next INR:  11/6/2017         How to take your warfarin dose     To take:  6 mg Take 3 of the 2 mg tablets.    To take:  8 mg Take 4 of the 2 mg tablets.            reviewed with you further at the time of your follow up visit. You will receive a call if there are any unexpected or concerning pathology findings.

## 2023-12-12 NOTE — OP NOTE
OPERATIVE REPORT  PATIENT NAME: Josiane Palacios    :  1955  MRN: [id number]  Pt Location: [facility] OR ROOM 04    SURGERY DATE: 2023    Surgeon(s) and Role:     * Angelique Wang MD - Primary    Preop Diagnosis:  Postmenopausal bleeding [N95.0]    Post-Op Diagnosis Codes:     * Postmenopausal bleeding [N95.0]    Procedure(s) (LRB):  EXAM UNDER ANESTHESIA (EUA) (N/A)  DILATATION AND CURETTAGE (D&C) (N/A)    Specimen(s):  ID Type Source Tests Collected by Time Destination   1 : [facility] Tissue Endometrium TISSUE EXAM Angelique Wang MD 2023 1426        Surgical QBL: Minimal    Drains: None    Anesthesia Type:   IV Sedation with Anesthesia    Operative Indications:  Postmenopausal bleeding [N95.0]    Operative Findings:  Normal external genitalia. Size 7 ring pessary with support in situ. Following removal of pessary, complete uterovaginal prolapse noted. Atrophic vaginal mucosa. Normal appearing cervix without gross lesions or masses. With the cervix under traction and completely prolapsed, a very superficial mucosal abrasion measuring approximately 1.5 cm in diameter was noted at the 9 o'clock position approximately 2 cm lateral to the cervicovaginal junction. No abnormal pigmentation, lesions, or masses noted. Complications:   None    Procedure and Technique:  The patient was taken to the operating room where IV anesthesia was administered. She was prepped and draped in the usual sterile fashion in the dorsal lithotomy position. A patient safety time-out was performed with all members of the operating room team present. After removal of the patient's pessary, examination under anesthesia revealed findings as noted above. A weighted speculum was placed and Jese retractors were utilized to obtain visualization of the cervix. An Sharmaine Sand was then placed on the anterior lip of the cervix and the uterus was placed under gentle traction in order to bring the cervix to its maximal extent of prolapse.  Findings were as noted above. A paracervical block was then administered using a total of 10 mL of lidocaine 1%. The uterus was sounded to a depth of 5 cm. A gentle yet thorough curettage of the endometrial canal was performed. Silver nitrate was then applied to the superficial mucosal abrasion to facilitate healing. Upon completion, the tenaculum was removed from the anterior lip of the cervix and good hemostasis was noted. All instruments were removed from the vagina. The prolapse was then digitally reduced and the patient's clean pessary was replaced. Sponge and instrument counts were correct. The patient tolerated the procedure well and was taken to the recovery room in stable condition. I was present for the entire procedure and performed the entirety of the case.     Patient Disposition:  PACU     SIGNATURE: Pola Henderson MD  DATE: December 12, 2023  TIME: 2:34 PM

## 2023-12-15 PROCEDURE — 88305 TISSUE EXAM BY PATHOLOGIST: CPT | Performed by: PATHOLOGY

## 2024-01-24 ENCOUNTER — OFFICE VISIT (OUTPATIENT)
Dept: CARDIOLOGY CLINIC | Facility: CLINIC | Age: 69
End: 2024-01-24
Payer: MEDICARE

## 2024-01-24 VITALS
SYSTOLIC BLOOD PRESSURE: 140 MMHG | OXYGEN SATURATION: 98 % | HEART RATE: 85 BPM | DIASTOLIC BLOOD PRESSURE: 90 MMHG | HEIGHT: 64 IN | BODY MASS INDEX: 29.84 KG/M2 | WEIGHT: 174.8 LBS

## 2024-01-24 DIAGNOSIS — E78.5 HYPERLIPIDEMIA, UNSPECIFIED HYPERLIPIDEMIA TYPE: ICD-10-CM

## 2024-01-24 DIAGNOSIS — R00.2 PALPITATIONS: Primary | ICD-10-CM

## 2024-01-24 PROCEDURE — 99214 OFFICE O/P EST MOD 30 MIN: CPT | Performed by: INTERNAL MEDICINE

## 2024-01-24 RX ORDER — PRAVASTATIN SODIUM 20 MG
20 TABLET ORAL DAILY
Qty: 30 TABLET | Refills: 6 | Status: SHIPPED | OUTPATIENT
Start: 2024-01-24

## 2024-01-24 NOTE — PROGRESS NOTES
Cardiology Outpatient Follow-Up Note - Sandy Mcgovern 68 y.o. female MRN: [id number]      Assessment/Plan:    1. Palpitations  Due to PVCs. These have largely resolved.    2. Hyperlipidemia, unspecified hyperlipidemia type  Failed atorvastatin due to myalgia. Given ASCVD risk, will try another agent. Start pravastatin 20 mg daily.   - pravastatin (PRAVACHOL) 20 mg tablet; Take 1 tablet (20 mg total) by mouth daily  Dispense: 30 tablet; Refill: 6      The 10-year ASCVD risk score (Eri PETERSEN, et al., 2019) is: 10.6%    Values used to calculate the score:      Age: 68 years      Sex: Female      Is Non- : No      Diabetic: No      Tobacco smoker: No      Systolic Blood Pressure: 140 mmHg      Is BP treated: No      HDL Cholesterol: 63 mg/dL      Total Cholesterol: 182 mg/dL      We will see Sandy Mcgovern back in 12 months for routine follow-up.    Subjective:     HPI: Sandy Mcgovern is a 68 y.o. year old female with palpitations, PVCs, HLD    We started atorvastatin at last OV. She called c/o muscle aches. She stopped for two weeks, had relief, then tried to start again but again had muscle aches.     Palpitations have resolved.     Her  passed away since our last OV. This has caused some stress initially but she has been feeling more normal lately.          Cardiac testing:       Holter 9/25/23  IMPRESSION:     The patient was in sinus rhythm throughout the duration of the study.  The average heart rate was 79 bpm.   Rare ectopy without sustained arrhythmias. PVC burden 0.7%; supraventricular ectopic burden of 0.4%.   An 8-beat run of atrial tachycardia is seen which did not correlate to symptoms.  Symptoms of dizziness did not correspond to any heart rhythm abnormalities.            Holter 12/27/22 -- personally reviewed and reinterpreted by me -- PVC 2.6%, no NSVT, 16 in couplets otherwise single VE's, no bigeminy/trigeminy. PVCs appear all of same morphology.      TTE 12/20/22  "Interpretation Summary          Left Ventricle: Left ventricular cavity size is normal. Wall thickness is normal. Systolic function is normal. Wall motion is normal. Diastolic function is normal.    Tricuspid Valve: There is mild regurgitation.           EKG reviewed personally:   EKG in the office 3/22/23 shows NSR, 64 bpm, normal axis, normal intervals. Nonspecific ST changes inferior. Abnormal study.       Relevant Labs & Results:  CMP 12/8/23 - K 4.6, Cr 0.55, GFR 99  Lipid panel 12/8/23 - , TG 55, HDL 63,  mg/dL  TSH 12/8/23 - 3.030 (WNL)        ROS:  Review of Systems:  Review of Systems   Constitutional:  Negative for activity change and fatigue.   HENT:  Negative for facial swelling.    Eyes:  Negative for visual disturbance.   Respiratory:  Negative for chest tightness and shortness of breath.    Cardiovascular:  Negative for chest pain, palpitations and leg swelling.   Gastrointestinal:  Negative for nausea and vomiting.   Musculoskeletal:  Negative for myalgias.   Skin:  Negative for pallor.   Allergic/Immunologic: Negative for immunocompromised state.   Neurological:  Negative for dizziness, syncope and light-headedness.   Psychiatric/Behavioral:  Negative for agitation and confusion. The patient is not nervous/anxious.        Objective:     Vitals:   Vitals:    01/24/24 1038   BP: 140/90   BP Location: Right arm   Patient Position: Sitting   Cuff Size: Standard   Pulse: 85   SpO2: 98%   Weight: 79.3 kg (174 lb 12.8 oz)   Height: 5' 4\" (1.626 m)    Body surface area is 1.85 meters squared.  Wt Readings from Last 3 Encounters:   01/24/24 79.3 kg (174 lb 12.8 oz)   12/12/23 80.6 kg (177 lb 9.6 oz)   12/06/23 81.6 kg (179 lb 12.8 oz)       Physical Exam:  General: Sandy Mcgovern is a well appearing female, in no acute distress, sitting comfortably  HEENT: moist mucous membranes, EOMI  Neck:  No JVD, supple, trachea midline  Cardiovascular: unremarkable S1/S2, regular rate and rhythm, no murmurs, " "rubs or gallops  Pulmonary: normal respiratory effort, CTAB  Abdomen: soft and nondistended  Extremities: No lower extremity edema. Warm and well perfused extremities.  Neuro: no focal motor deficits, AAOx3 (person, place, time)  Psych: Normal mood and affect, cooperative      Medications (at the START of this encounter):  Outpatient Medications Prior to Visit   Medication Sig Dispense Refill    Calcium Carb-Cholecalciferol 600-800 MG-UNIT TABS Take 1 tablet by mouth in the morning      Chlorphen-PE-Acetaminophen (CORICIDIN D COLD/FLU/SINUS PO) Take by mouth if needed Takes at night      estradiol (ESTRACE) 0.1 mg/g vaginal cream Insert 1 g into the vagina 2 (two) times a week Daily x 2 weeks, then 2-3 times weekly. Use lowest frequency necessary to alleviate symptoms 42.5 g 1    ibuprofen (MOTRIN) 200 mg tablet Take 400 mg by mouth in the morning      Oxyquinoline-Sod Lauryl Sulf (Trimo-Gu) 0.025-0.01 % GEL Insert 2 g into the vagina 2 (two) times a week       No facility-administered medications prior to visit.                 Time Spent:  Total time (face-to-face and non-face-to-face) spent on today's visit was 20 minutes. This includes preparation for the visits (i.e. reviewing test results), performance of a medically appropriate history and examination, and orders for medications, tests or other procedures. This time is exclusive of procedures performed and time spent teaching.      This note was completed in part utilizing Dragon [employer] One voice recognition software. Grammatical errors, random word insertion, spelling mistakes, occasional wrong word or \"sound-alike\" substitutions and incomplete sentences may be an occasional consequence of the system secondary to software limitations, ambient noise and hardware issues. At the time of dictation, efforts were made to edit, clarify and /or correct errors.  Please read the chart carefully and recognize, using context, where substitutions have occurred.  If you " have any questions or concerns about the context, text or information contained within the body of this dictation, please contact myself, the provider, for further clarification.

## 2024-01-26 ENCOUNTER — OFFICE VISIT (OUTPATIENT)
Dept: OBGYN CLINIC | Facility: CLINIC | Age: 69
End: 2024-01-26
Payer: MEDICARE

## 2024-01-26 VITALS
DIASTOLIC BLOOD PRESSURE: 74 MMHG | HEIGHT: 64 IN | SYSTOLIC BLOOD PRESSURE: 126 MMHG | WEIGHT: 173.8 LBS | BODY MASS INDEX: 29.67 KG/M2

## 2024-01-26 DIAGNOSIS — N95.2 VAGINAL ATROPHY: ICD-10-CM

## 2024-01-26 DIAGNOSIS — N81.3 UTERINE PROCIDENTIA: Primary | ICD-10-CM

## 2024-01-26 DIAGNOSIS — Z96.0 VAGINAL PESSARY IN SITU: ICD-10-CM

## 2024-01-26 PROCEDURE — 99213 OFFICE O/P EST LOW 20 MIN: CPT | Performed by: STUDENT IN AN ORGANIZED HEALTH CARE EDUCATION/TRAINING PROGRAM

## 2024-01-26 NOTE — PROGRESS NOTES
[facility] [address], [address], [place] [address]    Assessment/Plan:  1. Uterine procidentia  Assessment & Plan:  - Symptoms well controlled with pessary. Will continue.     Orders:  -     Pessary    2. Vaginal atrophy  Assessment & Plan:  - Continue estrace cream twice weekly.       3. Vaginal pessary in situ  Assessment & Plan:  - Continue Trimo-Gu twice weekly  - Pessary removed, cleaned, and replaced.   - Return to office in 3 months for routine pessary maintenance.        Pessary    Date/Time: 2024 9:20 AM    Performed by: Jeremiah Peres MD  Authorized by: Jeremiah Peres MD  Universal Protocol:  Consent: Verbal consent obtained.  Consent given by: patient  Patient understanding: patient states understanding of the procedure being performed  Required items: required blood products, implants, devices, and special equipment available  Patient identity confirmed: verbally with patient    Indication:     Indication for pessary: uterine prolapse    Pre-procedure:     Pessary procedure type:  Cleaning/check  Problems:     Pessary complications: none    Procedure:     Pessary type:  Ring w/ support    Pessary size:  7    Patient tolerance of procedure:  Tolerated well, no immediate complications        Subjective:   Sandy Mcgovern is a 68 y.o.  who presents for routine pessary maintenance.    CC:   Chief Complaint   Patient presents with    Gynecologic Exam       HPI: Patient reports no further bleeding since her operative procedure. Reports prolapse symptoms well-controlled. Denies vaginal discharge or irritation.     ROS: Negative except as noted in HPI    No LMP recorded (lmp unknown). Patient is postmenopausal.       She  reports that she is not currently sexually active. She reports using the following method of birth control/protection: Post-menopausal.       The following portions of the patient's history were reviewed and updated as appropriate:   Past Medical History:  "  Diagnosis Date    Cough     12/6/23 Deals with dry cough pt reports feels its a stress reaction the cough\"    History of umbilical hernia     Obesity     Papanicolaou smear 01/27/2020    neg    Rectocele     Sinus congestion     12/6/23 Intermitent sinus issues per pt - takes antihistamine as needed    Uterine prolapse 2020     Past Surgical History:   Procedure Laterality Date    COLONOSCOPY      DILATION AND EVACUATION  1980    x7    EGD      EXAMINATION UNDER ANESTHESIA N/A 12/12/2023    Procedure: EXAM UNDER ANESTHESIA (EUA);  Surgeon: Jeremiah Peres MD;  Location: [facility] MAIN OR;  Service: Gynecology    HERNIA REPAIR  2013    MAMMO (HISTORICAL) Bilateral 01/30/2020    [facility] BI-RADS 2 Benign findings Scattered area fibroglandular density; 25% to 50% glandular breast tissue    NEUROPLASTY / TRANSPOSITION MEDIAN NERVE AT CARPAL TUNNEL  2012    PA DILATION & CURETTAGE DX&/THER NONOBSTETRIC N/A 12/12/2023    Procedure: DILATATION AND CURETTAGE (D&C);  Surgeon: Jeremiah Peres MD;  Location: [facility];  Service: Gynecology     Family History   Problem Relation Age of Onset    Breast cancer Mother 94    Stroke Father     Heart disease Father     Diabetes Father     Hypertension Maternal Grandmother     Hypertension Paternal Grandmother     Ovarian cancer Neg Hx     Colon cancer Neg Hx     Uterine cancer Neg Hx     Anesthesia problems Neg Hx      Social History     Socioeconomic History    Marital status: /Civil Union     Spouse name: Not on file    Number of children: 1    Years of education: Not on file    Highest education level: Not on file   Occupational History    Not on file   Tobacco Use    Smoking status: Never     Passive exposure: Never    Smokeless tobacco: Never    Tobacco comments:     Never a smoker or any tobacco use per pt    Vaping Use    Vaping status: Never Used   Substance and Sexual Activity    Alcohol use: Not Currently     Comment: No current alcohol use within the last 6 mos per pt    Drug " use: Never     Comment: Denies any drug use per pt    Sexual activity: Not Currently     Birth control/protection: Post-menopausal     Comment: Not active at this time per pt   Other Topics Concern    Not on file   Social History Narrative    Exercises occasionally     Social Determinants of Health     Financial Resource Strain: Low Risk  (11/18/2022)    Overall Financial Resource Strain (CARDIA)     Difficulty of Paying Living Expenses: Not hard at all   Food Insecurity: Not on file   Transportation Needs: No Transportation Needs (11/18/2022)    PRAPARE - Transportation     Lack of Transportation (Medical): No     Lack of Transportation (Non-Medical): No   Physical Activity: Not on file   Stress: Not on file   Social Connections: Unknown (11/5/2020)    Social Connection and Isolation Panel [NHANES]     Frequency of Communication with Friends and Family: Not on file     Frequency of Social Gatherings with Friends and Family: Not on file     Attends [employer] Services: Not on file     Active Member of Clubs or Organizations: Not on file     Attends Club or Organization Meetings: Not on file     Marital Status:    Intimate Partner Violence: Not At Risk (11/20/2023)    Humiliation, Afraid, Rape, and Kick questionnaire     Fear of Current or Ex-Partner: No     Emotionally Abused: No     Physically Abused: No     Sexually Abused: No   Housing Stability: Not on file     Outpatient Medications Marked as Taking for the 1/26/24 encounter (Office Visit) with Jeremiah Peres MD   Medication    Calcium Carb-Cholecalciferol 600-800 MG-UNIT TABS    Chlorphen-PE-Acetaminophen (CORICIDIN D COLD/FLU/SINUS PO)    estradiol (ESTRACE) 0.1 mg/g vaginal cream    ibuprofen (MOTRIN) 200 mg tablet    Oxyquinoline-Sod Lauryl Sulf (Trimo-Gu) 0.025-0.01 % GEL    pravastatin (PRAVACHOL) 20 mg tablet     Allergies   Allergen Reactions    Naproxen Anaphylaxis     Per pt throat swelled up and eyes swelling - could barely breathe     "Amphotericin B Rash    Ampicillin Rash    Penicillins Rash           Objective:  /74 (BP Location: Left arm, Patient Position: Sitting, Cuff Size: Standard)   Ht 5' 4\" (1.626 m)   Wt 78.8 kg (173 lb 12.8 oz)   LMP  (LMP Unknown)   Breastfeeding No   BMI 29.83 kg/m²        Chaperone present? Yes: Antonio Gabriel MA.    General Appearance: alert and oriented, in no acute distress.   Abdomen: Soft, non-tender, non-distended, no masses, no rebound or guarding.  Pelvic:       External genitalia: Normal appearance, no abnormal pigmentation, no lesions or masses. Normal Bartholin's and [place]'s.      Urinary system: Urethral meatus normal, bladder non-tender.      Vaginal: Complete procidentia. Atrophic mucosa. No erosions. Normal-appearing physiologic discharge.  Extremities: Normal range of motion.   Skin: normal, no rash or abnormalities  Neurologic: alert, oriented x3  Psychiatric: Appropriate affect, mood stable, cooperative with exam.        Jeremiah Peres MD  1/26/2024 9:49 AM  "

## 2024-01-26 NOTE — ASSESSMENT & PLAN NOTE
- Continue Trimo-Gu twice weekly  - Pessary removed, cleaned, and replaced.   - Return to office in 3 months for routine pessary maintenance.

## 2024-02-22 ENCOUNTER — EVALUATION (OUTPATIENT)
Dept: PHYSICAL THERAPY | Facility: CLINIC | Age: 69
End: 2024-02-22
Payer: MEDICARE

## 2024-02-22 DIAGNOSIS — M76.822 POSTERIOR TIBIAL TENDON DYSFUNCTION (PTTD) OF LEFT LOWER EXTREMITY: ICD-10-CM

## 2024-02-22 DIAGNOSIS — M19.071 ARTHRITIS OF RIGHT ANKLE: Primary | ICD-10-CM

## 2024-02-22 DIAGNOSIS — M21.42 PES PLANUS OF LEFT FOOT: ICD-10-CM

## 2024-02-22 PROCEDURE — 97110 THERAPEUTIC EXERCISES: CPT | Performed by: PHYSICAL THERAPIST

## 2024-02-22 PROCEDURE — 97162 PT EVAL MOD COMPLEX 30 MIN: CPT | Performed by: PHYSICAL THERAPIST

## 2024-02-22 NOTE — PROGRESS NOTES
PT Evaluation     Today's date: 2024  Patient name: Sandy Mcgovern  : 1955  MRN: [id number]  Referring provider: Bekah Isaacs DO  Dx:   Encounter Diagnosis     ICD-10-CM    1. Arthritis of right ankle  M19.071       2. Posterior tibial tendon dysfunction (PTTD) of left lower extremity  M76.822       3. Pes planus of left foot  M21.42                      Assessment  Assessment details: Patient presents with arthritis of right ankle and pes planus of left foot. Demonstrates decrease in right ankle ROM and b/l lower extremity strength, severe hypomobility of right ankle, rear foot, and forefoot and tenderness along plantar aspect of right foot. Plan to assess balance next visit. Sandy presents with the impairments as listed above and would benefit from skilled Physical Therapy to address these impairments in order to maximize functional capacity and return to prior level of function. Thank you for this referral!  Impairments: abnormal or restricted ROM, activity intolerance, impaired balance, impaired physical strength, lacks appropriate home exercise program and pain with function    Goals  Impairment Goals:  1. Decrease right ankle pain to 0/10 with activities in 8 weeks  2. Increase right ankle inversion ROM by 5* in 8 weeks  3. Increase b/l lower extremity strength by ½ grade in 8 weeks    Functional Goals:  1. Patient demonstrates independence with HEP upon discharge  2. Patient is able to ambulate household distances without right ankle pain in 8-12 weeks    Plan  Patient would benefit from: PT eval and skilled physical therapy  Planned therapy interventions: joint mobilization, manual therapy, neuromuscular re-education, home exercise program, therapeutic exercise, therapeutic training, patient education and balance/weight bearing training  Frequency: 2x week  Duration in weeks: 8  Plan of Care expiration date: 2024  Treatment plan discussed with: patient        Subjective  "Evaluation    History of Present Illness  Mechanism of injury: 67 yo female presents with c/o right ankle arthritis impacting quality of life. Pt reports she has been wearing a \"horizon\" left ankle/foot brace for the last 6-7 years due to a collapsing arch. Pt notes no brace and orthotic has been prescribed for her right foot. Pt also c/o feeling unsteady or \"bouncy\" on her feet and would like to improve her physical fitness and balance.  Patient Goals  Patient goals for therapy: improved balance, increased motion and independence with ADLs/IADLs    Pain  Current pain ratin  At best pain ratin  At worst pain ratin (min afternoon)  Location: Right Ankle  Quality: dull ache  Aggravating factors: standing and walking  Progression: worsening        Objective     Active Range of Motion     Right Ankle/Foot   Dorsiflexion (ke): 22 degrees   Dorsiflexion (kf): 25 degrees   Inversion: 20 degrees with pain  Eversion: 5 degrees     Strength/Myotome Testing     Left Hip   Planes of Motion   Flexion: 3+  Abduction: 3+  Adduction: 4  External rotation: 4+    Right Hip   Planes of Motion   Flexion: 4-  Abduction: 3-  Adduction: 4-  External rotation: 4    Left Knee   Flexion: 4-  Extension: 4+    Right Knee   Flexion: 4+ (left lower leg pain)  Extension: 4+    Left Ankle/Foot   Dorsiflexion: 4+    Right Ankle/Foot   Dorsiflexion: 4+  Plantar flexion: 2+  Inversion: 2+  Eversion: 2+  Great toe flexion: 4  Great toe extension: 4+    Additional Strength Details  SLR w/ ER: 4-/5 R, 3/5 L    Ambulation     Comments   Pt ambulates with a SPC and soft left ankle brace. Left LE is excessively externally rotated throughout gait and mild increase in right hip ER with ambulation. Right foot pronates during stance phase.           Precautions: none      Manuals             Right Ankle/foot PROM/mobs DAYRON            Left Ankle/Foot Assess next visit                                      Neuro Re-Ed             Ankle DF/PF Seated " "or supine  HEP            Ankle IV Iso 1\"x10            TA SLR 7x ea                                                                Ther Ex             Balance assessment nv            Ankle IV PROM Self stretch  30\"x3                                                                                          Ther Activity                                       Gait Training                                       Modalities                                            "

## 2024-02-29 ENCOUNTER — OFFICE VISIT (OUTPATIENT)
Dept: PHYSICAL THERAPY | Facility: CLINIC | Age: 69
End: 2024-02-29
Payer: MEDICARE

## 2024-02-29 DIAGNOSIS — E78.5 HYPERLIPIDEMIA, UNSPECIFIED HYPERLIPIDEMIA TYPE: ICD-10-CM

## 2024-02-29 DIAGNOSIS — M19.071 ARTHRITIS OF RIGHT ANKLE: Primary | ICD-10-CM

## 2024-02-29 DIAGNOSIS — M21.42 PES PLANUS OF LEFT FOOT: ICD-10-CM

## 2024-02-29 DIAGNOSIS — M76.822 POSTERIOR TIBIAL TENDON DYSFUNCTION (PTTD) OF LEFT LOWER EXTREMITY: ICD-10-CM

## 2024-02-29 PROCEDURE — 97140 MANUAL THERAPY 1/> REGIONS: CPT

## 2024-02-29 PROCEDURE — 97110 THERAPEUTIC EXERCISES: CPT

## 2024-02-29 PROCEDURE — 97112 NEUROMUSCULAR REEDUCATION: CPT

## 2024-02-29 RX ORDER — PRAVASTATIN SODIUM 20 MG
20 TABLET ORAL DAILY
Qty: 90 TABLET | Refills: 3 | Status: SHIPPED | OUTPATIENT
Start: 2024-02-29

## 2024-02-29 NOTE — PROGRESS NOTES
"Daily Note     Today's date: 2024  Patient name: Sandy Mcgovern  : 1955  MRN: [id number]  Referring provider: Bekah Isaacs DO  Dx:   Encounter Diagnosis     ICD-10-CM    1. Arthritis of right ankle  M19.071       2. Posterior tibial tendon dysfunction (PTTD) of left lower extremity  M76.822       3. Pes planus of left foot  M21.42                      Subjective: pt states that she is having difficulty with a few of the exercises for home.       Objective: See treatment diary below      Assessment: Tolerated treatment with corrections and modifications for HEP of things she was unable to perform.  As now using hand as isometric resistance instead of ball.. Patient was able to advance with seated HR and TR with weights and bands.       Plan: Continue per plan of care.      Precautions: none      Manuals            Right Ankle/foot PROM/mobs DAYRON DL           Left Ankle/Foot Assess next visit Assess nv                                     Neuro Re-Ed             Ankle DF/PF Seated or supine  HEP            Ankle IV Iso 1\"x10 5\"x10           TA SLR 7x ea                                                                Ther Ex             Balance assessment nv            Ankle IV PROM Self stretch  30\"x3 reviewed           seAted HR  9kb x10           Seated TR  Gtb x10                                                               Ther Activity                                       Gait Training                                       Modalities                                            "

## 2024-03-01 ENCOUNTER — APPOINTMENT (OUTPATIENT)
Dept: PHYSICAL THERAPY | Facility: CLINIC | Age: 69
End: 2024-03-01
Payer: MEDICARE

## 2024-03-07 ENCOUNTER — APPOINTMENT (OUTPATIENT)
Dept: PHYSICAL THERAPY | Facility: CLINIC | Age: 69
End: 2024-03-07
Payer: MEDICARE

## 2024-03-08 ENCOUNTER — APPOINTMENT (OUTPATIENT)
Dept: PHYSICAL THERAPY | Facility: CLINIC | Age: 69
End: 2024-03-08
Payer: MEDICARE

## 2024-03-14 ENCOUNTER — OFFICE VISIT (OUTPATIENT)
Dept: PHYSICAL THERAPY | Facility: CLINIC | Age: 69
End: 2024-03-14
Payer: MEDICARE

## 2024-03-14 DIAGNOSIS — M76.822 POSTERIOR TIBIAL TENDON DYSFUNCTION (PTTD) OF LEFT LOWER EXTREMITY: ICD-10-CM

## 2024-03-14 DIAGNOSIS — M21.42 PES PLANUS OF LEFT FOOT: ICD-10-CM

## 2024-03-14 DIAGNOSIS — M19.071 ARTHRITIS OF RIGHT ANKLE: Primary | ICD-10-CM

## 2024-03-14 PROCEDURE — 97112 NEUROMUSCULAR REEDUCATION: CPT | Performed by: PHYSICAL THERAPIST

## 2024-03-14 PROCEDURE — 97110 THERAPEUTIC EXERCISES: CPT | Performed by: PHYSICAL THERAPIST

## 2024-03-14 PROCEDURE — 97140 MANUAL THERAPY 1/> REGIONS: CPT | Performed by: PHYSICAL THERAPIST

## 2024-03-14 NOTE — PROGRESS NOTES
"Daily Note     Today's date: 3/14/2024  Patient name: Sandy Mcgovern  : 1955  MRN: [id number]  Referring provider: Bekah Isaacs DO  Dx:   Encounter Diagnosis     ICD-10-CM    1. Arthritis of right ankle  M19.071       2. Posterior tibial tendon dysfunction (PTTD) of left lower extremity  M76.822       3. Pes planus of left foot  M21.42                      Subjective: Sandy explains that her feet are feeling okay today, she is having trouble with some of her exercises.       Objective: See treatment diary below      Assessment: Tolerated treatment well. Patient demonstrated fatigue post treatment and exhibited good technique with therapeutic exercises. Cueing to correct form and knee position during ankle balance board, challenge with control particularly on right ankle. Added bridges and increased SLR repetitions this session with fatigue only upon completion. Modifications to form and technique to complete dorsiflexion with resistance. Updated HEP to reflect changes made this session.      Plan: Continue per plan of care.      Precautions: none      Manuals 2/22 2/29 3/14          Right Ankle/foot PROM/mobs DAYRON DL RN          Left Ankle/Foot Assess next visit Assess nv                                     Neuro Re-Ed             Ankle DF/PF Seated or supine  HEP            Ankle Inv Iso 1\"x10 5\"x10 10x5''          TA SLR 7x ea  10 on R, 8 on L          Bridges   x10          Ankle balance board   X10 a/p, side                                    Ther Ex             Balance assessment nv            Ankle IV PROM Self stretch  30\"x3 reviewed           seAted HR  9kb x10 2x10 9 lb kb          Seated TR  Gtb x10 2x10 GTB                                                              Ther Activity                                       Gait Training                                       Modalities                                              "

## 2024-03-21 ENCOUNTER — OFFICE VISIT (OUTPATIENT)
Dept: PHYSICAL THERAPY | Facility: CLINIC | Age: 69
End: 2024-03-21
Payer: MEDICARE

## 2024-03-21 DIAGNOSIS — M21.42 PES PLANUS OF LEFT FOOT: ICD-10-CM

## 2024-03-21 DIAGNOSIS — M19.071 ARTHRITIS OF RIGHT ANKLE: Primary | ICD-10-CM

## 2024-03-21 DIAGNOSIS — M76.822 POSTERIOR TIBIAL TENDON DYSFUNCTION (PTTD) OF LEFT LOWER EXTREMITY: ICD-10-CM

## 2024-03-21 PROCEDURE — 97112 NEUROMUSCULAR REEDUCATION: CPT | Performed by: PHYSICAL THERAPIST

## 2024-03-21 PROCEDURE — 97110 THERAPEUTIC EXERCISES: CPT | Performed by: PHYSICAL THERAPIST

## 2024-03-21 PROCEDURE — 97140 MANUAL THERAPY 1/> REGIONS: CPT | Performed by: PHYSICAL THERAPIST

## 2024-03-21 NOTE — PROGRESS NOTES
"Daily Note     Today's date: 3/21/2024  Patient name: Sandy Mcgovern  : 1955  MRN: [id number]  Referring provider: Bekah Isaacs DO  Dx:   Encounter Diagnosis     ICD-10-CM    1. Arthritis of right ankle  M19.071       2. Posterior tibial tendon dysfunction (PTTD) of left lower extremity  M76.822       3. Pes planus of left foot  M21.42                      Subjective: Sandy explains that her exercises are going well.       Objective: See treatment diary below      Assessment: Tolerated treatment well. Patient demonstrated fatigue post treatment and exhibited good technique with therapeutic exercises. Added in stance balance with vision eliminated, fatigue upon completion and noted challenge during completion. Fatigue in peroneals and hip after semi tandem stance. Continue to progress range of motion, strength and balance.       Plan: Continue per plan of care.      Precautions: none      Manuals 2/22 2/29 3/14 3/21         Right Ankle/foot PROM/mobs DAYRON DL RN RN         Left Ankle/Foot Assess next visit Assess nv                                     Neuro Re-Ed             Ankle DF/PF Seated or supine  HEP            Ankle Inv Iso 1\"x10 5\"x10 10x5'' 10x5''         TA SLR 7x ea  10 on R, 8 on L X10 ea         Bridges   x10 2x10         Ankle balance board   X10 a/p, side X10 a/p, side         Semi tandem stance - EC    15''x3 ea                      Ther Ex             Balance assessment nv            Ankle IV PROM Self stretch  30\"x3 reviewed           seAted HR  9kb x10 2x10 9 lb kb 2x10 9 lb kb         Seated TR  Gtb x10 2x10 GTB 2x10 GTB                                                             Ther Activity                                       Gait Training                                       Modalities                                                " 112

## 2024-03-28 ENCOUNTER — OFFICE VISIT (OUTPATIENT)
Dept: PHYSICAL THERAPY | Facility: CLINIC | Age: 69
End: 2024-03-28
Payer: MEDICARE

## 2024-03-28 DIAGNOSIS — M76.822 POSTERIOR TIBIAL TENDON DYSFUNCTION (PTTD) OF LEFT LOWER EXTREMITY: ICD-10-CM

## 2024-03-28 DIAGNOSIS — M21.42 PES PLANUS OF LEFT FOOT: ICD-10-CM

## 2024-03-28 DIAGNOSIS — M19.071 ARTHRITIS OF RIGHT ANKLE: Primary | ICD-10-CM

## 2024-03-28 PROCEDURE — 97140 MANUAL THERAPY 1/> REGIONS: CPT | Performed by: PHYSICAL THERAPIST

## 2024-03-28 PROCEDURE — 97112 NEUROMUSCULAR REEDUCATION: CPT | Performed by: PHYSICAL THERAPIST

## 2024-03-28 PROCEDURE — 97110 THERAPEUTIC EXERCISES: CPT | Performed by: PHYSICAL THERAPIST

## 2024-03-28 NOTE — PROGRESS NOTES
"Daily Note     Today's date: 3/28/2024  Patient name: Sandy Mcgovern  : 1955  MRN: [id number]  Referring provider: Bekah Isaacs DO  Dx:   Encounter Diagnosis     ICD-10-CM    1. Arthritis of right ankle  M19.071       2. Posterior tibial tendon dysfunction (PTTD) of left lower extremity  M76.822       3. Pes planus of left foot  M21.42                      Subjective: Pt explains that her right ankle is feeling achy this morning.       Objective: See treatment diary below      Assessment: Tolerated treatment well. Patient demonstrated fatigue post treatment and exhibited good technique with therapeutic exercises. Sandy responded well to all TE this session, improved tolerance to increased resistance band and balance TE. Improved form and control with balance board. Add in standing balance board nv.      Plan: Continue per plan of care.      Precautions: none      Manuals 2/22 2/29 3/14 3/21 3/28        Right Ankle/foot PROM/mobs DAYRON DL RN RN RN        Left Ankle/Foot Assess next visit Assess nv                                     Neuro Re-Ed             Ankle DF/PF Seated or supine  HEP            Ankle Inv Iso 1\"x10 5\"x10 10x5'' 10x5'' 10x5''        TA SLR 7x ea  10 on R, 8 on L X10 ea X10 ea        Bridges   x10 2x10 2x10        Ankle balance board   X10 a/p, side X10 a/p, side X10 a/p, side        Semi tandem stance - EC    15''x3 ea 15''x3 ea        Wobble board standing     nv        Ther Ex             Balance assessment nv            Ankle IV PROM Self stretch  30\"x3 reviewed           seAted HR  9kb x10 2x10 9 lb kb 2x10 9 lb kb         Seated DF/PF  Gtb x10 2x10 GTB 2x10 GTB 2x10 BTB ea                                                            Ther Activity                                       Gait Training                                       Modalities                                                  "

## 2024-04-04 ENCOUNTER — OFFICE VISIT (OUTPATIENT)
Dept: PHYSICAL THERAPY | Facility: CLINIC | Age: 69
End: 2024-04-04
Payer: MEDICARE

## 2024-04-04 DIAGNOSIS — M21.42 PES PLANUS OF LEFT FOOT: ICD-10-CM

## 2024-04-04 DIAGNOSIS — M19.071 ARTHRITIS OF RIGHT ANKLE: Primary | ICD-10-CM

## 2024-04-04 DIAGNOSIS — M76.822 POSTERIOR TIBIAL TENDON DYSFUNCTION (PTTD) OF LEFT LOWER EXTREMITY: ICD-10-CM

## 2024-04-04 PROCEDURE — 97110 THERAPEUTIC EXERCISES: CPT | Performed by: PHYSICAL THERAPIST

## 2024-04-04 PROCEDURE — 97140 MANUAL THERAPY 1/> REGIONS: CPT | Performed by: PHYSICAL THERAPIST

## 2024-04-04 PROCEDURE — 97112 NEUROMUSCULAR REEDUCATION: CPT | Performed by: PHYSICAL THERAPIST

## 2024-04-04 NOTE — PROGRESS NOTES
"Daily Note     Today's date: 2024  Patient name: Sandy Mcgovern  : 1955  MRN: [id number]  Referring provider: Bekah Isaacs DO  Dx:   Encounter Diagnosis     ICD-10-CM    1. Arthritis of right ankle  M19.071       2. Posterior tibial tendon dysfunction (PTTD) of left lower extremity  M76.822       3. Pes planus of left foot  M21.42                      Subjective: Sandy explains that she feels \"terrific\" after hands on treatment, she finds that she is able to stand for longer periods of time which she is happy about.      Objective: See treatment diary below      Assessment: Tolerated treatment well. Patient demonstrated fatigue post treatment and exhibited good technique with therapeutic exercises. Noted challenge and fatigue during wobble board in standing, no increase in pain post. Continued benefit and relief post manual treatment. Continue to add standing balance nc and change surface to increase challenge.       Plan: Continue per plan of care.      Precautions: none      Manuals 2/22 2/29 3/14 3/21 3/28 4/4       Right Ankle/foot PROM/mobs DAYRON DL RN RN RN RN       Left Ankle/Foot Assess next visit Assess nv                                     Neuro Re-Ed             Ankle DF/PF Seated or supine  HEP            Ankle Inv Iso 1\"x10 5\"x10 10x5'' 10x5'' 10x5'' 10x5''       TA SLR 7x ea  10 on R, 8 on L X10 ea X10 ea X10 ea       Bridges   x10 2x10 2x10 2x10       Ankle balance board   X10 a/p, side X10 a/p, side X10 a/p, side X10 a/p, side       Semi tandem stance - EC    15''x3 ea 15''x3 ea 15''x3 ea       Wobble board standing     nv 2x10 a/p, side       Standing tandem stance      nv       NBOS EO/EC      nv                    Ther Ex             Balance assessment nv            Ankle IV PROM Self stretch  30\"x3 reviewed           seAted HR  9kb x10 2x10 9 lb kb 2x10 9 lb kb         Seated DF/PF  Gtb x10 2x10 GTB 2x10 GTB 2x10 BTB ea 2x10 BTB ea                                                  "          Ther Activity                                       Gait Training                                       Modalities

## 2024-04-11 ENCOUNTER — OFFICE VISIT (OUTPATIENT)
Dept: PHYSICAL THERAPY | Facility: CLINIC | Age: 69
End: 2024-04-11
Payer: MEDICARE

## 2024-04-11 DIAGNOSIS — M21.42 PES PLANUS OF LEFT FOOT: ICD-10-CM

## 2024-04-11 DIAGNOSIS — M76.822 POSTERIOR TIBIAL TENDON DYSFUNCTION (PTTD) OF LEFT LOWER EXTREMITY: ICD-10-CM

## 2024-04-11 DIAGNOSIS — M19.071 ARTHRITIS OF RIGHT ANKLE: Primary | ICD-10-CM

## 2024-04-11 PROCEDURE — 97112 NEUROMUSCULAR REEDUCATION: CPT | Performed by: PHYSICAL THERAPIST

## 2024-04-11 PROCEDURE — 97140 MANUAL THERAPY 1/> REGIONS: CPT | Performed by: PHYSICAL THERAPIST

## 2024-04-11 PROCEDURE — 97110 THERAPEUTIC EXERCISES: CPT | Performed by: PHYSICAL THERAPIST

## 2024-04-11 NOTE — PROGRESS NOTES
"Daily Note     Today's date: 2024  Patient name: Sandy Mcgovern  : 1955  MRN: [id number]  Referring provider: Bekah Isaacs DO  Dx:   Encounter Diagnosis     ICD-10-CM    1. Arthritis of right ankle  M19.071       2. Posterior tibial tendon dysfunction (PTTD) of left lower extremity  M76.822       3. Pes planus of left foot  M21.42                      Subjective: Sandy explains that her feet feel like they have more mobility, at times she walks without the cane because her confidence has been improving.       Objective: See treatment diary below      Assessment: Tolerated treatment well. Patient demonstrated fatigue post treatment and exhibited good technique with therapeutic exercises. Sandy explains that she feels a world of difference after manuals, observational gait with and without the cane. Continue to progress ankle stability and balance nv.      Plan: Continue per plan of care.      Precautions: none      Manuals 2/22 2/29 3/14 3/21 3/28 4/4 4/11      Right Ankle/foot PROM/mobs DAYRON DL RN RN RN RN RN      Left Ankle/Foot Assess next visit Assess nv                                     Neuro Re-Ed             Ankle DF/PF Seated or supine  HEP            Ankle Inv Iso 1\"x10 5\"x10 10x5'' 10x5'' 10x5'' 10x5''       TA SLR 7x ea  10 on R, 8 on L X10 ea X10 ea X10 ea       Bridges   x10 2x10 2x10 2x10       Ankle balance board   X10 a/p, side X10 a/p, side X10 a/p, side X10 a/p, side X10 a/p, side      Semi tandem stance - EC    15''x3 ea 15''x3 ea 15''x3 ea 15'x3      Wobble board standing     nv 2x10 a/p, side 2x10 a/p, side      Standing tandem stance      nv       NBOS EO/EC      nv 3x15''                   Ther Ex             Balance assessment nv            Ankle IV PROM Self stretch  30\"x3 reviewed           seAted HR  9kb x10 2x10 9 lb kb 2x10 9 lb kb   2x10 standing      Seated DF/PF  Gtb x10 2x10 GTB 2x10 GTB 2x10 BTB ea 2x10 BTB ea 2x10 BTB ea                                           "                Ther Activity                                       Gait Training                                       Modalities

## 2024-04-18 ENCOUNTER — OFFICE VISIT (OUTPATIENT)
Dept: PHYSICAL THERAPY | Facility: CLINIC | Age: 69
End: 2024-04-18
Payer: MEDICARE

## 2024-04-18 DIAGNOSIS — M19.071 ARTHRITIS OF RIGHT ANKLE: Primary | ICD-10-CM

## 2024-04-18 DIAGNOSIS — M76.822 POSTERIOR TIBIAL TENDON DYSFUNCTION (PTTD) OF LEFT LOWER EXTREMITY: ICD-10-CM

## 2024-04-18 DIAGNOSIS — M21.42 PES PLANUS OF LEFT FOOT: ICD-10-CM

## 2024-04-18 PROCEDURE — 97112 NEUROMUSCULAR REEDUCATION: CPT | Performed by: PHYSICAL THERAPIST

## 2024-04-18 PROCEDURE — 97110 THERAPEUTIC EXERCISES: CPT | Performed by: PHYSICAL THERAPIST

## 2024-04-18 PROCEDURE — 97140 MANUAL THERAPY 1/> REGIONS: CPT | Performed by: PHYSICAL THERAPIST

## 2024-04-18 NOTE — PROGRESS NOTES
"Daily Note     Today's date: 2024  Patient name: Sandy Mcgovern  : 1955  MRN: [id number]  Referring provider: Bekah Isaacs DO  Dx:   Encounter Diagnosis     ICD-10-CM    1. Arthritis of right ankle  M19.071       2. Posterior tibial tendon dysfunction (PTTD) of left lower extremity  M76.822       3. Pes planus of left foot  M21.42                      Subjective: Sandy explains that she is looking forward to getting stretched out, she continues to be very pleased with how her foot is improving.       Objective: See treatment diary below      Assessment: Tolerated treatment well. Patient demonstrated fatigue post treatment and exhibited good technique with therapeutic exercises. Cueing to correct control and form during balance board and heel raises, updated HEP to include tandem walking and balance with head turns. Also increased resistance to black band.       Plan: Continue per plan of care.      Precautions: none      Manuals 2/22 2/29 3/14 3/21 3/28 4/4 4/11 4/18     Right Ankle/foot PROM/mobs DAYRON DL RN RN RN RN RN RN     Left Ankle/Foot Assess next visit Assess nv                                     Neuro Re-Ed             Ankle DF/PF Seated or supine  HEP            Ankle Inv Iso 1\"x10 5\"x10 10x5'' 10x5'' 10x5'' 10x5''       TA SLR 7x ea  10 on R, 8 on L X10 ea X10 ea X10 ea       Bridges   x10 2x10 2x10 2x10       Ankle balance board   X10 a/p, side X10 a/p, side X10 a/p, side X10 a/p, side X10 a/p, side X10 a/p, side     Semi tandem stance - EC    15''x3 ea 15''x3 ea 15''x3 ea 15'x3 15''x3     Wobble board standing     nv 2x10 a/p, side 2x10 a/p, side 2x10 a/p, side     Standing tandem stance      nv       NBOS EO/EC      nv 3x15'' 3x15'' w/ head turns     Tandem walking        2 laps 10'     Ther Ex             Balance assessment nv            Ankle IV PROM Self stretch  30\"x3 reviewed           seAted HR  9kb x10 2x10 9 lb kb 2x10 9 lb kb   2x10 standing 2x10 standing     Seated DF/PF  " Gtb x10 2x10 GTB 2x10 GTB 2x10 BTB ea 2x10 BTB ea 2x10 BTB ea 2x10 Black TB ea                                                         Ther Activity                                       Gait Training                                       Modalities

## 2024-04-25 ENCOUNTER — OFFICE VISIT (OUTPATIENT)
Dept: PHYSICAL THERAPY | Facility: CLINIC | Age: 69
End: 2024-04-25
Payer: MEDICARE

## 2024-04-25 DIAGNOSIS — M21.42 PES PLANUS OF LEFT FOOT: ICD-10-CM

## 2024-04-25 DIAGNOSIS — M76.822 POSTERIOR TIBIAL TENDON DYSFUNCTION (PTTD) OF LEFT LOWER EXTREMITY: ICD-10-CM

## 2024-04-25 DIAGNOSIS — M19.071 ARTHRITIS OF RIGHT ANKLE: Primary | ICD-10-CM

## 2024-04-25 PROCEDURE — 97140 MANUAL THERAPY 1/> REGIONS: CPT | Performed by: PHYSICAL THERAPIST

## 2024-04-25 PROCEDURE — 97110 THERAPEUTIC EXERCISES: CPT | Performed by: PHYSICAL THERAPIST

## 2024-04-25 PROCEDURE — 97112 NEUROMUSCULAR REEDUCATION: CPT | Performed by: PHYSICAL THERAPIST

## 2024-04-25 NOTE — PROGRESS NOTES
"Daily Note     Today's date: 2024  Patient name: Sandy Mcgovern  : 1955  MRN: [id number]  Referring provider: Bekah Isaacs DO  Dx:   Encounter Diagnosis     ICD-10-CM    1. Arthritis of right ankle  M19.071       2. Posterior tibial tendon dysfunction (PTTD) of left lower extremity  M76.822       3. Pes planus of left foot  M21.42                      Subjective: Sandy explains that her ankle strength and range of motion have been continuing to improve.       Objective: See treatment diary below      Assessment: Tolerated treatment well. Patient demonstrated fatigue post treatment and would benefit from continued PT. She continues to improve in range of motion and stability with noticeable improved stability with NBOS EC on foam and tandem walking. Updated HEP to reflect changes made this session.      Plan: Continue per plan of care.      Precautions: none      Manuals 2/22 2/29 3/14 3/21 3/28 4/4 4/11 4/18 4/25    Right Ankle/foot PROM/mobs DAYRON DL RN RN RN RN RN RN RN    Left Ankle/Foot Assess next visit Assess nv                                     Neuro Re-Ed             Ankle DF/PF Seated or supine  HEP            Ankle Inv Iso 1\"x10 5\"x10 10x5'' 10x5'' 10x5'' 10x5''       TA SLR 7x ea  10 on R, 8 on L X10 ea X10 ea X10 ea       Bridges   x10 2x10 2x10 2x10       Ankle balance board   X10 a/p, side X10 a/p, side X10 a/p, side X10 a/p, side X10 a/p, side X10 a/p, side X10 a/p, side    Semi tandem stance - EC    15''x3 ea 15''x3 ea 15''x3 ea 15'x3 15''x3 15x3''    Wobble board standing     nv 2x10 a/p, side 2x10 a/p, side 2x10 a/p, side 2x10 a/p, side    Standing tandem stance      nv       NBOS EO/EC      nv 3x15'' 3x15'' w/ head turns 3x15'' w/ head turns    Tandem walking        2 laps 10' 2 laps 10'    Ther Ex             Balance assessment nv            Ankle IV PROM Self stretch  30\"x3 reviewed           seated HR  9kb x10 2x10 9 lb kb 2x10 9 lb kb   2x10 standing 2x10 standing 2x10 " standing    Seated DF/PF  Gtb x10 2x10 GTB 2x10 GTB 2x10 BTB ea 2x10 BTB ea 2x10 BTB ea 2x10 Black TB ea 2x10 Black TB ea                                                        Ther Activity                                       Gait Training                                       Modalities

## 2024-04-26 ENCOUNTER — OFFICE VISIT (OUTPATIENT)
Dept: OBGYN CLINIC | Facility: CLINIC | Age: 69
End: 2024-04-26
Payer: MEDICARE

## 2024-04-26 VITALS
BODY MASS INDEX: 30.01 KG/M2 | HEIGHT: 64 IN | DIASTOLIC BLOOD PRESSURE: 82 MMHG | SYSTOLIC BLOOD PRESSURE: 116 MMHG | WEIGHT: 175.8 LBS

## 2024-04-26 DIAGNOSIS — N95.2 VAGINAL ATROPHY: ICD-10-CM

## 2024-04-26 DIAGNOSIS — N81.3 UTERINE PROCIDENTIA: Primary | ICD-10-CM

## 2024-04-26 DIAGNOSIS — Z96.0 VAGINAL PESSARY IN SITU: ICD-10-CM

## 2024-04-26 DIAGNOSIS — R35.0 FREQUENT URINATION: ICD-10-CM

## 2024-04-26 LAB
SL AMB  POCT GLUCOSE, UA: ABNORMAL
SL AMB LEUKOCYTE ESTERASE,UA: ABNORMAL
SL AMB POCT BILIRUBIN,UA: ABNORMAL
SL AMB POCT BLOOD,UA: ABNORMAL
SL AMB POCT KETONES,UA: ABNORMAL
SL AMB POCT NITRITE,UA: ABNORMAL
SL AMB POCT PH,UA: 5
SL AMB POCT SPECIFIC GRAVITY,UA: 1
SL AMB POCT URINE PROTEIN: ABNORMAL
SL AMB POCT UROBILINOGEN: 0.2

## 2024-04-26 PROCEDURE — 81002 URINALYSIS NONAUTO W/O SCOPE: CPT | Performed by: STUDENT IN AN ORGANIZED HEALTH CARE EDUCATION/TRAINING PROGRAM

## 2024-04-26 PROCEDURE — 99214 OFFICE O/P EST MOD 30 MIN: CPT | Performed by: STUDENT IN AN ORGANIZED HEALTH CARE EDUCATION/TRAINING PROGRAM

## 2024-04-26 RX ORDER — ESTRADIOL 0.1 MG/G
1 CREAM VAGINAL 2 TIMES WEEKLY
Qty: 42.5 G | Refills: 1 | Status: SHIPPED | OUTPATIENT
Start: 2024-04-29

## 2024-04-26 RX ORDER — OXYQUINOLINE SULFATE AND SODIUM LAURYL SULFATE .25; .1 MG/G; MG/G
2 JELLY VAGINAL 2 TIMES WEEKLY
Qty: 113.4 G | Refills: 1 | Status: SHIPPED | OUTPATIENT
Start: 2024-04-29

## 2024-04-26 NOTE — ASSESSMENT & PLAN NOTE
- Continue Trimo-Gu twice weekly. Rx refilled today.  - Pessary removed, cleaned, and replaced.   - Return to office in 3 months for routine pessary maintenance.

## 2024-04-26 NOTE — PROGRESS NOTES
[facility] [address], [address], [place] [address]    Assessment/Plan:  1. Uterine procidentia    2. Vaginal pessary in situ  Assessment & Plan:  - Continue Trimo-Gu twice weekly. Rx refilled today.  - Pessary removed, cleaned, and replaced.   - Return to office in 3 months for routine pessary maintenance.      Orders:  -     Oxyquinoline-Sod Lauryl Sulf (Trimo-Gu) 0.025-0.01 % GEL; Insert 2 g into the vagina 2 (two) times a week    3. Vaginal atrophy  Assessment & Plan:  - Continue estrace cream twice weekly. Rx refilled today.     Orders:  -     estradiol (ESTRACE) 0.1 mg/g vaginal cream; Insert 1 g into the vagina 2 (two) times a week Daily x 2 weeks, then 2-3 times weekly. Use lowest frequency necessary to alleviate symptoms    4. Frequent urination  Assessment & Plan:  - Due to increase in urinary frequency and urgency, UA/Ucx collected and sent today. Urine dipstick non-diagnostic. Will await results of culture and treat if positive.     Orders:  -     POCT urine dip  -     Urinalysis with microscopic  -     Urine culture    Pessary    Date/Time: 4/26/2024 9:45 AM    Performed by: Jeremiah Peres MD  Authorized by: Jeremiah Peres MD  Universal Protocol:  Consent: Verbal consent obtained.  Risks and benefits: risks, benefits and alternatives were discussed  Consent given by: patient  Patient understanding: patient states understanding of the procedure being performed  Patient consent: the patient's understanding of the procedure matches consent given  Procedure consent: procedure consent matches procedure scheduled  Patient identity confirmed: verbally with patient    Indication:     Indication for pessary: uterine prolapse    Pre-procedure:     Pessary procedure type:  Cleaning/check  Problems:     Pessary complications: none    Procedure:     Pessary type:  Ring w/ support    Pessary size:  7    Patient tolerance of procedure:  Tolerated well, no immediate  "complications        Subjective:   Sandy Mcgovern is a 68 y.o.  .  CC:   Chief Complaint   Patient presents with    Gynecologic Exam       HPI: Patient presents for routine pessary maintenance. She denies vaginal discharge or bleeding. She does report increase in urinary frequency and some urgency over the last couple of weeks. Denies dysuria or fever.     ROS: Negative except as noted in HPI    No LMP recorded (lmp unknown). Patient is postmenopausal.       She  reports that she is not currently sexually active. She reports using the following method of birth control/protection: Post-menopausal.       The following portions of the patient's history were reviewed and updated as appropriate:   Past Medical History:   Diagnosis Date    Cough     23 Deals with dry cough pt reports feels its a stress reaction the cough\"    History of umbilical hernia     Obesity     Papanicolaou smear 2020    neg    Rectocele     Sinus congestion     23 Intermitent sinus issues per pt - takes antihistamine as needed    Uterine prolapse      Past Surgical History:   Procedure Laterality Date    COLONOSCOPY      DILATION AND EVACUATION  1980    x7    EGD      EXAMINATION UNDER ANESTHESIA N/A 2023    Procedure: EXAM UNDER ANESTHESIA (EUA);  Surgeon: Jeremiah Peres MD;  Location: [facility];  Service: Gynecology    HERNIA REPAIR  2013    MAMMO (HISTORICAL) Bilateral 2020    [facility] BI-RADS 2 Benign findings Scattered area fibroglandular density; 25% to 50% glandular breast tissue    NEUROPLASTY / TRANSPOSITION MEDIAN NERVE AT CARPAL TUNNEL      MD DILATION & CURETTAGE DX&/THER NONOBSTETRIC N/A 2023    Procedure: DILATATION AND CURETTAGE (D&C);  Surgeon: Jeremiah Peres MD;  Location: [facility];  Service: Gynecology     Family History   Problem Relation Age of Onset    Breast cancer Mother 94    Stroke Father     Heart disease Father     Diabetes Father     Hypertension Maternal Grandmother     " Hypertension Paternal Grandmother     Ovarian cancer Neg Hx     Colon cancer Neg Hx     Uterine cancer Neg Hx     Anesthesia problems Neg Hx      Social History     Socioeconomic History    Marital status: /Civil Union     Spouse name: Not on file    Number of children: 1    Years of education: Not on file    Highest education level: Not on file   Occupational History    Not on file   Tobacco Use    Smoking status: Never     Passive exposure: Never    Smokeless tobacco: Never    Tobacco comments:     Never a smoker or any tobacco use per pt    Vaping Use    Vaping status: Never Used   Substance and Sexual Activity    Alcohol use: Not Currently     Comment: No current alcohol use within the last 6 mos per pt    Drug use: Never     Comment: Denies any drug use per pt    Sexual activity: Not Currently     Birth control/protection: Post-menopausal     Comment: Not active at this time per pt   Other Topics Concern    Not on file   Social History Narrative    Exercises occasionally     Social Determinants of Health     Financial Resource Strain: Low Risk  (11/18/2022)    Overall Financial Resource Strain (CARDIA)     Difficulty of Paying Living Expenses: Not hard at all   Food Insecurity: Not on file   Transportation Needs: No Transportation Needs (11/18/2022)    PRAPARE - Transportation     Lack of Transportation (Medical): No     Lack of Transportation (Non-Medical): No   Physical Activity: Not on file   Stress: Not on file   Social Connections: Unknown (11/5/2020)    Social Connection and Isolation Panel [NHANES]     Frequency of Communication with Friends and Family: Not on file     Frequency of Social Gatherings with Friends and Family: Not on file     Attends Faith Services: Not on file     Active Member of Clubs or Organizations: Not on file     Attends Club or Organization Meetings: Not on file     Marital Status:    Intimate Partner Violence: Not At Risk (11/20/2023)    Humiliation, Afraid,  "Rape, and Kick questionnaire     Fear of Current or Ex-Partner: No     Emotionally Abused: No     Physically Abused: No     Sexually Abused: No   Housing Stability: Not on file     Outpatient Medications Marked as Taking for the 4/26/24 encounter (Office Visit) with Jeremiah Peres MD   Medication    Calcium Carb-Cholecalciferol 600-800 MG-UNIT TABS    Chlorphen-PE-Acetaminophen (CORICIDIN D COLD/FLU/SINUS PO)    [START ON 4/29/2024] estradiol (ESTRACE) 0.1 mg/g vaginal cream    ibuprofen (MOTRIN) 200 mg tablet    [START ON 4/29/2024] Oxyquinoline-Sod Lauryl Sulf (Trimo-Gu) 0.025-0.01 % GEL    pravastatin (PRAVACHOL) 20 mg tablet    [DISCONTINUED] estradiol (ESTRACE) 0.1 mg/g vaginal cream    [DISCONTINUED] Oxyquinoline-Sod Lauryl Sulf (Trimo-Gu) 0.025-0.01 % GEL     Allergies   Allergen Reactions    Naproxen Anaphylaxis     Per pt throat swelled up and eyes swelling - could barely breathe    Amphotericin B Rash    Ampicillin Rash    Penicillins Rash           Objective:  /82 (BP Location: Left arm, Patient Position: Sitting, Cuff Size: Standard)   Ht 5' 4\" (1.626 m)   Wt 79.7 kg (175 lb 12.8 oz)   LMP  (LMP Unknown)   Breastfeeding No   BMI 30.18 kg/m²        Chaperone present? Yes: Antonio Gabriel MA.    General Appearance: alert and oriented, in no acute distress.   Abdomen: Soft, non-tender, non-distended, no masses, no rebound or guarding.  Pelvic:       External genitalia: Normal appearance, no abnormal pigmentation, no lesions or masses. Normal Bartholin's and Brenas's.      Urinary system: Urethral meatus normal, bladder non-tender.      Vaginal: Complete procidentia. Atrophic mucosa. No erosions. Normal-appearing physiologic discharge.  Extremities: Normal range of motion.   Skin: normal, no rash or abnormalities  Neurologic: alert, oriented x3  Psychiatric: Appropriate affect, mood stable, cooperative with exam.        Jeremiah Peres MD  4/26/2024 10:08 AM  "

## 2024-04-26 NOTE — ASSESSMENT & PLAN NOTE
- Due to increase in urinary frequency and urgency, UA/Ucx collected and sent today. Urine dipstick non-diagnostic. Will await results of culture and treat if positive.

## 2024-04-28 LAB
APPEARANCE UR: CLEAR
BACTERIA UR CULT: ABNORMAL
BACTERIA URNS QL MICRO: NORMAL
BILIRUB UR QL STRIP: NEGATIVE
CASTS URNS QL MICRO: NORMAL /LPF
COLOR UR: YELLOW
EPI CELLS #/AREA URNS HPF: NORMAL /HPF (ref 0–10)
GLUCOSE UR QL: NEGATIVE
HGB UR QL STRIP: NEGATIVE
KETONES UR QL STRIP: NEGATIVE
LEUKOCYTE ESTERASE UR QL STRIP: ABNORMAL
Lab: ABNORMAL
MICRO URNS: ABNORMAL
NITRITE UR QL STRIP: NEGATIVE
PH UR STRIP: 6.5 [PH] (ref 5–7.5)
PROT UR QL STRIP: NEGATIVE
RBC #/AREA URNS HPF: NORMAL /HPF (ref 0–2)
SP GR UR: 1.01 (ref 1–1.03)
UROBILINOGEN UR STRIP-ACNC: 0.2 MG/DL (ref 0.2–1)
WBC #/AREA URNS HPF: NORMAL /HPF (ref 0–5)

## 2024-04-29 DIAGNOSIS — N30.00 ACUTE CYSTITIS WITHOUT HEMATURIA: Primary | ICD-10-CM

## 2024-04-29 RX ORDER — FLUCONAZOLE 150 MG/1
150 TABLET ORAL EVERY OTHER DAY
Qty: 2 TABLET | Refills: 0 | Status: SHIPPED | OUTPATIENT
Start: 2024-04-29 | End: 2024-05-02

## 2024-04-29 RX ORDER — FLUCONAZOLE 150 MG/1
150 TABLET ORAL ONCE
Qty: 1 TABLET | Refills: 0 | Status: SHIPPED | OUTPATIENT
Start: 2024-04-29 | End: 2024-04-29

## 2024-04-29 RX ORDER — SULFAMETHOXAZOLE AND TRIMETHOPRIM 800; 160 MG/1; MG/1
1 TABLET ORAL EVERY 12 HOURS SCHEDULED
Qty: 6 TABLET | Refills: 0 | Status: SHIPPED | OUTPATIENT
Start: 2024-04-29 | End: 2024-05-02

## 2024-05-03 ENCOUNTER — APPOINTMENT (OUTPATIENT)
Dept: PHYSICAL THERAPY | Facility: CLINIC | Age: 69
End: 2024-05-03
Payer: MEDICARE

## 2024-05-09 ENCOUNTER — OFFICE VISIT (OUTPATIENT)
Dept: PHYSICAL THERAPY | Facility: CLINIC | Age: 69
End: 2024-05-09
Payer: MEDICARE

## 2024-05-09 DIAGNOSIS — M76.822 POSTERIOR TIBIAL TENDON DYSFUNCTION (PTTD) OF LEFT LOWER EXTREMITY: ICD-10-CM

## 2024-05-09 DIAGNOSIS — M21.42 PES PLANUS OF LEFT FOOT: ICD-10-CM

## 2024-05-09 DIAGNOSIS — M19.071 ARTHRITIS OF RIGHT ANKLE: Primary | ICD-10-CM

## 2024-05-09 PROCEDURE — 97140 MANUAL THERAPY 1/> REGIONS: CPT | Performed by: PHYSICAL THERAPIST

## 2024-05-09 PROCEDURE — 97112 NEUROMUSCULAR REEDUCATION: CPT | Performed by: PHYSICAL THERAPIST

## 2024-05-09 PROCEDURE — 97110 THERAPEUTIC EXERCISES: CPT | Performed by: PHYSICAL THERAPIST

## 2024-05-09 NOTE — PROGRESS NOTES
"Daily Note     Today's date: 2024  Patient name: Sandy Mcgovern  : 1955  MRN: [id number]  Referring provider: Bekah Isaacs DO  Dx:   Encounter Diagnosis     ICD-10-CM    1. Arthritis of right ankle  M19.071       2. Posterior tibial tendon dysfunction (PTTD) of left lower extremity  M76.822       3. Pes planus of left foot  M21.42           Start Time: 1400  Stop Time: 1445  Total time in clinic (min): 45 minutes    Subjective: Patient reports R-sided heel pain that she noticed when she got out of bed this morning. Sx are improved and not constant but it is tender to the touch.       Objective: See treatment diary below      Assessment: Patient was able to complete majority of exercises without onset of heel pain. However, tandem stance with R foot in posterior position was held secondary to pain. Patient also reported onset of sx with first few laps of tandem walking but this resolved with remainder of laps. Patient given gentle calf and plantar fascia stretch to perform at home to avoid excessive irritation of the calcaneal tubercle. Formal assessment may be required if sx continue to be present next week.       Plan: Continue per plan of care.      Precautions: none      Manuals 2/22 2/29 3/14 3/21 3/28 4/4 4/11 4/18 4/25 5/9   Right Ankle/foot PROM/mobs DAYRON DL RN RN RN RN RN RN RN AB   Left Ankle/Foot Assess next visit Assess nv                                     Neuro Re-Ed             Ankle DF/PF Seated or supine  HEP            Ankle Inv Iso 1\"x10 5\"x10 10x5'' 10x5'' 10x5'' 10x5''       TA SLR 7x ea  10 on R, 8 on L X10 ea X10 ea X10 ea       Bridges   x10 2x10 2x10 2x10       Ankle balance board   X10 a/p, side X10 a/p, side X10 a/p, side X10 a/p, side X10 a/p, side X10 a/p, side X10 a/p, side 10x ap, side   Semi tandem stance - EC    15''x3 ea 15''x3 ea 15''x3 ea 15'x3 15''x3 15x3'' R ant 2x20\"   Wobble board standing     nv 2x10 a/p, side 2x10 a/p, side 2x10 a/p, side 2x10 a/p, side 2x10 " "ap, side   Standing tandem stance      nv       NBOS EO/EC      nv 3x15'' 3x15'' w/ head turns 3x15'' w/ head turns 3x15'' w/ head turns   Tandem walking        2 laps 10' 2 laps 10' 8 laps 10'   Ther Ex             Balance assessment nv            Ankle IV PROM Self stretch  30\"x3 reviewed           seated HR  9kb x10 2x10 9 lb kb 2x10 9 lb kb   2x10 standing 2x10 standing 2x10 standing nv   Seated DF/PF  Gtb x10 2x10 GTB 2x10 GTB 2x10 BTB ea 2x10 BTB ea 2x10 BTB ea 2x10 Black TB ea 2x10 Black TB ea nv   HEP          Gastroc and Plantar fascia towel stretch                                          Ther Activity                                       Gait Training                                       Modalities                                                            "

## 2024-05-16 ENCOUNTER — OFFICE VISIT (OUTPATIENT)
Dept: PHYSICAL THERAPY | Facility: CLINIC | Age: 69
End: 2024-05-16
Payer: MEDICARE

## 2024-05-16 DIAGNOSIS — M76.822 POSTERIOR TIBIAL TENDON DYSFUNCTION (PTTD) OF LEFT LOWER EXTREMITY: ICD-10-CM

## 2024-05-16 DIAGNOSIS — M19.071 ARTHRITIS OF RIGHT ANKLE: Primary | ICD-10-CM

## 2024-05-16 DIAGNOSIS — M21.42 PES PLANUS OF LEFT FOOT: ICD-10-CM

## 2024-05-16 PROCEDURE — 97112 NEUROMUSCULAR REEDUCATION: CPT | Performed by: PHYSICAL THERAPIST

## 2024-05-16 PROCEDURE — 97110 THERAPEUTIC EXERCISES: CPT | Performed by: PHYSICAL THERAPIST

## 2024-05-16 PROCEDURE — 97140 MANUAL THERAPY 1/> REGIONS: CPT | Performed by: PHYSICAL THERAPIST

## 2024-05-16 NOTE — PROGRESS NOTES
"Daily Note     Today's date: 2024  Patient name: Sandy Mcgovern  : 1955  MRN: [id number]  Referring provider: Bekah Isaacs DO  Dx:   Encounter Diagnosis     ICD-10-CM    1. Arthritis of right ankle  M19.071       2. Posterior tibial tendon dysfunction (PTTD) of left lower extremity  M76.822       3. Pes planus of left foot  M21.42                      Subjective: Sandy explains that her right feel is feeling a little less sore, she lost the paper with her stretches on it. She had a good follow up with her podiatrist and he is pleased with her progress.       Objective: See treatment diary below      Assessment: Tolerated treatment well. Patient demonstrated fatigue post treatment and exhibited good technique with therapeutic exercises. Sandy explains that she felt challenged performing her narrow base of support with head turns, updated HEP to include. Also reprinted plantar fascia and gastroc stretches for HEP.      Plan: Continue per plan of care.      Precautions: none      Manuals 2/22 2/29 3/14 3/21 3/28 4/4 4/11 4/18 4/25 5/16   Right Ankle/foot PROM/mobs DAYRON DL RN RN RN RN RN RN RN RN   Left Ankle/Foot Assess next visit Assess nv                                     Neuro Re-Ed             Ankle DF/PF Seated or supine  HEP            Ankle Inv Iso 1\"x10 5\"x10 10x5'' 10x5'' 10x5'' 10x5''       TA SLR 7x ea  10 on R, 8 on L X10 ea X10 ea X10 ea       Bridges   x10 2x10 2x10 2x10       Ankle balance board   X10 a/p, side X10 a/p, side X10 a/p, side X10 a/p, side X10 a/p, side X10 a/p, side X10 a/p, side 10x ap, side   Semi tandem stance - EC    15''x3 ea 15''x3 ea 15''x3 ea 15'x3 15''x3 15x3'' R ant 2x20\"   Wobble board standing     nv 2x10 a/p, side 2x10 a/p, side 2x10 a/p, side 2x10 a/p, side 2x10 ap, side   Standing tandem stance      nv       NBOS EO/EC      nv 3x15'' 3x15'' w/ head turns 3x15'' w/ head turns 3x15'' w/ head turns (L/R, U/D)   Tandem walking        2 laps 10' 2 laps 10' 8 " "laps 10'   Ther Ex             Balance assessment nv            Ankle IV PROM Self stretch  30\"x3 reviewed           seated HR  9kb x10 2x10 9 lb kb 2x10 9 lb kb   2x10 standing 2x10 standing 2x10 standing 2x10 standing   Seated DF/PF  Gtb x10 2x10 GTB 2x10 GTB 2x10 BTB ea 2x10 BTB ea 2x10 BTB ea 2x10 Black TB ea 2x10 Black TB ea nv   HEP          Gastroc and Plantar fascia towel stretch                                          Ther Activity                                       Gait Training                                       Modalities                                                              "

## 2024-06-06 ENCOUNTER — OFFICE VISIT (OUTPATIENT)
Dept: PHYSICAL THERAPY | Facility: CLINIC | Age: 69
End: 2024-06-06
Payer: MEDICARE

## 2024-06-06 DIAGNOSIS — M19.071 ARTHRITIS OF RIGHT ANKLE: Primary | ICD-10-CM

## 2024-06-06 DIAGNOSIS — M21.42 PES PLANUS OF LEFT FOOT: ICD-10-CM

## 2024-06-06 DIAGNOSIS — M76.822 POSTERIOR TIBIAL TENDON DYSFUNCTION (PTTD) OF LEFT LOWER EXTREMITY: ICD-10-CM

## 2024-06-06 PROCEDURE — 97112 NEUROMUSCULAR REEDUCATION: CPT | Performed by: PHYSICAL THERAPIST

## 2024-06-06 PROCEDURE — 97110 THERAPEUTIC EXERCISES: CPT | Performed by: PHYSICAL THERAPIST

## 2024-06-06 PROCEDURE — 97140 MANUAL THERAPY 1/> REGIONS: CPT | Performed by: PHYSICAL THERAPIST

## 2024-06-06 NOTE — PROGRESS NOTES
"Daily Note     Today's date: 2024  Patient name: Sandy Mcgovern  : 1955  MRN: [id number]  Referring provider: Bekah Isaacs DO  Dx:   Encounter Diagnosis     ICD-10-CM    1. Arthritis of right ankle  M19.071       2. Posterior tibial tendon dysfunction (PTTD) of left lower extremity  M76.822       3. Pes planus of left foot  M21.42                      Subjective: Sandy notes that her vision has continued to be problematic but her ankles are still feeling better.       Objective: See treatment diary below      Assessment: Tolerated treatment well. Patient demonstrated fatigue post treatment and exhibited good technique with therapeutic exercises. Added in tandem stance balance with eyes open and closed today with fatigue and challenge during completion. Ankle and hip righting reactions noted as present during completion.       Plan: Continue per plan of care.      Precautions: none      Manuals 6/6 2/29 3/14 3/21 3/28 4/4 4/11 4/18 4/25 5/16   Right Ankle/foot PROM/mobs RN DL RN RN RN RN RN RN RN RN   Left Ankle/Foot  Assess nv                                     Neuro Re-Ed             Ankle DF/PF             Ankle Inv Iso  5\"x10 10x5'' 10x5'' 10x5'' 10x5''       TA SLR   10 on R, 8 on L X10 ea X10 ea X10 ea       Bridges   x10 2x10 2x10 2x10       Ankle balance board 10x a/p, side  X10 a/p, side X10 a/p, side X10 a/p, side X10 a/p, side X10 a/p, side X10 a/p, side X10 a/p, side 10x ap, side   Semi tandem stance - EC R ant 2x20''   15''x3 ea 15''x3 ea 15''x3 ea 15'x3 15''x3 15x3'' R ant 2x20\"   Wobble board standing 2x10 a/p, side    nv 2x10 a/p, side 2x10 a/p, side 2x10 a/p, side 2x10 a/p, side 2x10 ap, side   Standing tandem stance      nv       NBOS EO/EC 3x15'' w/ head turns (L/R, U/D)     nv 3x15'' 3x15'' w/ head turns 3x15'' w/ head turns 3x15'' w/ head turns (L/R, U/D)   Tandem walking 4 laps 10'       2 laps 10' 2 laps 10' 8 laps 10'   Standing calf s' 10x10'' ea                            "                        Ther Ex             Balance assessment             Ankle IV PROM  reviewed           seated HR 2x10 standing 9kb x10 2x10 9 lb kb 2x10 9 lb kb   2x10 standing 2x10 standing 2x10 standing 2x10 standing   Seated DF/PF  Gtb x10 2x10 Black TB 2x10 GTB 2x10 BTB ea 2x10 BTB ea 2x10 BTB ea 2x10 Black TB ea 2x10 Black TB ea nv   HEP          Gastroc and Plantar fascia towel stretch                                          Ther Activity                                       Gait Training                                       Modalities

## 2024-06-10 ENCOUNTER — TELEPHONE (OUTPATIENT)
Age: 69
End: 2024-06-10

## 2024-06-10 ENCOUNTER — OFFICE VISIT (OUTPATIENT)
Dept: URGENT CARE | Facility: CLINIC | Age: 69
End: 2024-06-10
Payer: MEDICARE

## 2024-06-10 VITALS
TEMPERATURE: 98.5 F | BODY MASS INDEX: 30.01 KG/M2 | DIASTOLIC BLOOD PRESSURE: 66 MMHG | HEIGHT: 64 IN | RESPIRATION RATE: 18 BRPM | OXYGEN SATURATION: 98 % | HEART RATE: 73 BPM | WEIGHT: 175.8 LBS | SYSTOLIC BLOOD PRESSURE: 127 MMHG

## 2024-06-10 DIAGNOSIS — H65.91 RIGHT NON-SUPPURATIVE OTITIS MEDIA: Primary | ICD-10-CM

## 2024-06-10 PROCEDURE — 99213 OFFICE O/P EST LOW 20 MIN: CPT

## 2024-06-10 PROCEDURE — G0463 HOSPITAL OUTPT CLINIC VISIT: HCPCS

## 2024-06-10 RX ORDER — AZITHROMYCIN 250 MG/1
TABLET, FILM COATED ORAL
Qty: 6 TABLET | Refills: 0 | Status: SHIPPED | OUTPATIENT
Start: 2024-06-10 | End: 2024-06-14

## 2024-06-10 NOTE — TELEPHONE ENCOUNTER
Warm transfer from [place]     Patient complains of earache symptoms started on Saturday. .      Advise patient to be seen at . No available appts.

## 2024-06-10 NOTE — PROGRESS NOTES
[facility]        NAME: Sandy Mcgovern is a 68 y.o. female  : 1955    MRN: [id number]  DATE: Zenaida 10, 2024  TIME: 2:55 PM    Assessment and Plan   Right non-suppurative otitis media [H65.91]  1. Right non-suppurative otitis media  azithromycin (ZITHROMAX) 250 mg tablet      Recommended flonase as well. Follow up with PCP if symptoms persist.   Patient Instructions     Otitis media diagnosed on exam today.  Antibiotics sent to the pharmacy. Follow up with PCP in 3-5 days if no improvement. Proceed to ER if symptoms worsen.    Chief Complaint     Chief Complaint   Patient presents with    Earache     Pt has been having  ear pain on her right side stating the right is worst then the left ear, also having a mild headache for about 3 days , she stating that she hasn't been taking advil for pain . She thinks it might be her allergies bothering.     History of Present Illness     Sandy Mcgovern is a 68 y.o. female presenting to the office today complaining of ear pain.   Symptoms have been present for 3 days, and include right sided ear pain.   She has tried advil for her symptoms, some relief.  Sick contacts include: none    Review of Systems     Review of Systems   Constitutional:  Negative for chills and fever.   HENT:  Positive for congestion and ear pain. Negative for sore throat and trouble swallowing.    Respiratory:  Positive for cough. Negative for shortness of breath, wheezing and stridor.    Gastrointestinal:  Negative for nausea and vomiting.   Genitourinary: Negative.    Musculoskeletal: Negative.    Skin: Negative.    Neurological: Negative.        Current Medications       Current Outpatient Medications:     azithromycin (ZITHROMAX) 250 mg tablet, Take 2 tablets today then 1 tablet daily x 4 days, Disp: 6 tablet, Rfl: 0    Calcium Carb-Cholecalciferol 600-800 MG-UNIT TABS, Take 1 tablet by mouth in the morning, Disp: , Rfl:     Chlorphen-PE-Acetaminophen (CORICIDIN D COLD/FLU/SINUS PO), Take  "by mouth if needed Takes at night, Disp: , Rfl:     estradiol (ESTRACE) 0.1 mg/g vaginal cream, Insert 1 g into the vagina 2 (two) times a week Daily x 2 weeks, then 2-3 times weekly. Use lowest frequency necessary to alleviate symptoms, Disp: 42.5 g, Rfl: 1    ibuprofen (MOTRIN) 200 mg tablet, Take 400 mg by mouth in the morning, Disp: , Rfl:     Oxyquinoline-Sod Lauryl Sulf (Trimo-Gu) 0.025-0.01 % GEL, Insert 2 g into the vagina 2 (two) times a week, Disp: 113.4 g, Rfl: 1    pravastatin (PRAVACHOL) 20 mg tablet, TAKE 1 TABLET BY MOUTH EVERY DAY, Disp: 90 tablet, Rfl: 3    Current Allergies     Allergies as of 06/10/2024 - Reviewed 06/10/2024   Allergen Reaction Noted    Naproxen Anaphylaxis 08/14/2012    Amphotericin b Rash 11/16/2021    Ampicillin Rash 07/08/2021    Penicillins Rash 08/14/2012            The following portions of the patient's history were reviewed and updated as appropriate: allergies, current medications, past family history, past medical history, past social history, past surgical history and problem list.     Past Medical History:   Diagnosis Date    Cough     12/6/23 Deals with dry cough pt reports feels its a stress reaction the cough\"    History of umbilical hernia     Obesity     Papanicolaou smear 01/27/2020    neg    Rectocele     Sinus congestion     12/6/23 Intermitent sinus issues per pt - takes antihistamine as needed    Uterine prolapse 2020       Past Surgical History:   Procedure Laterality Date    COLONOSCOPY      DILATION AND EVACUATION  1980    x7    EGD      EXAMINATION UNDER ANESTHESIA N/A 12/12/2023    Procedure: EXAM UNDER ANESTHESIA (EUA);  Surgeon: Jeremiah Peres MD;  Location:  MAIN OR;  Service: Gynecology    HERNIA REPAIR  2013    MAMMO (HISTORICAL) Bilateral 01/30/2020    [facility] BI-RADS 2 Benign findings Scattered area fibroglandular density; 25% to 50% glandular breast tissue    NEUROPLASTY / TRANSPOSITION MEDIAN NERVE AT CARPAL TUNNEL  2012    DC DILATION & " "CURETTAGE DX&/THER NONOBSTETRIC N/A 12/12/2023    Procedure: DILATATION AND CURETTAGE (D&C);  Surgeon: Jeremiah Peres MD;  Location:  MAIN OR;  Service: Gynecology       Family History   Problem Relation Age of Onset    Breast cancer Mother 94    Stroke Father     Heart disease Father     Diabetes Father     Hypertension Maternal Grandmother     Hypertension Paternal Grandmother     Ovarian cancer Neg Hx     Colon cancer Neg Hx     Uterine cancer Neg Hx     Anesthesia problems Neg Hx        Medications have been verified.    Objective     /66   Pulse 73   Temp 98.5 °F (36.9 °C)   Resp 18   Ht 5' 4\" (1.626 m)   Wt 79.7 kg (175 lb 12.8 oz)   LMP  (LMP Unknown)   SpO2 98%   BMI 30.18 kg/m²   No LMP recorded (lmp unknown). Patient is postmenopausal.     Physical Exam     Physical Exam  Vitals and nursing note reviewed.   Constitutional:       General: She is not in acute distress.     Appearance: Normal appearance. She is normal weight. She is not ill-appearing, toxic-appearing or diaphoretic.   HENT:      Head: Normocephalic and atraumatic.      Right Ear: Ear canal and external ear normal. There is no impacted cerumen. Tympanic membrane is erythematous and bulging.      Left Ear: Tympanic membrane, ear canal and external ear normal. There is no impacted cerumen.      Nose: Congestion and rhinorrhea present.      Mouth/Throat:      Mouth: Mucous membranes are moist.      Pharynx: Posterior oropharyngeal erythema present. No oropharyngeal exudate.   Eyes:      General: No scleral icterus.        Right eye: No discharge.         Left eye: No discharge.      Conjunctiva/sclera: Conjunctivae normal.   Cardiovascular:      Rate and Rhythm: Normal rate and regular rhythm.      Pulses: Normal pulses.      Heart sounds: Normal heart sounds. No murmur heard.     No friction rub. No gallop.   Pulmonary:      Effort: Pulmonary effort is normal. No respiratory distress.      Breath sounds: Normal breath sounds. No " stridor. No wheezing, rhonchi or rales.   Chest:      Chest wall: No tenderness.   Musculoskeletal:         General: Normal range of motion.      Cervical back: Normal range of motion and neck supple. No rigidity or tenderness.   Lymphadenopathy:      Cervical: No cervical adenopathy.   Skin:     General: Skin is warm and dry.      Capillary Refill: Capillary refill takes less than 2 seconds.      Coloration: Skin is not jaundiced.      Findings: No bruising or rash.   Neurological:      General: No focal deficit present.      Mental Status: She is alert. Mental status is at baseline.   Psychiatric:         Mood and Affect: Mood normal.         Behavior: Behavior normal.

## 2024-06-13 ENCOUNTER — OFFICE VISIT (OUTPATIENT)
Dept: PHYSICAL THERAPY | Facility: CLINIC | Age: 69
End: 2024-06-13
Payer: MEDICARE

## 2024-06-13 DIAGNOSIS — M21.42 PES PLANUS OF LEFT FOOT: ICD-10-CM

## 2024-06-13 DIAGNOSIS — M76.822 POSTERIOR TIBIAL TENDON DYSFUNCTION (PTTD) OF LEFT LOWER EXTREMITY: ICD-10-CM

## 2024-06-13 DIAGNOSIS — M19.071 ARTHRITIS OF RIGHT ANKLE: Primary | ICD-10-CM

## 2024-06-13 PROCEDURE — 97112 NEUROMUSCULAR REEDUCATION: CPT | Performed by: PHYSICAL THERAPIST

## 2024-06-13 PROCEDURE — 97110 THERAPEUTIC EXERCISES: CPT | Performed by: PHYSICAL THERAPIST

## 2024-06-13 PROCEDURE — 97140 MANUAL THERAPY 1/> REGIONS: CPT | Performed by: PHYSICAL THERAPIST

## 2024-06-13 NOTE — PROGRESS NOTES
"Daily Note     Today's date: 2024  Patient name: Sandy Mcgovern  : 1955  MRN: [id number]  Referring provider: Bekah Isaacs DO  Dx:   Encounter Diagnosis     ICD-10-CM    1. Arthritis of right ankle  M19.071       2. Posterior tibial tendon dysfunction (PTTD) of left lower extremity  M76.822       3. Pes planus of left foot  M21.42                      Subjective: Sandy explains that her ankles have been doing well, her vision continues to come with challenges but she is happy with the consistency of her feet.      Objective: See treatment diary below      Assessment: Tolerated treatment well. Patient demonstrated fatigue post treatment and exhibited good technique with therapeutic exercises. Sandy continues to improve with balance with head turns, continued improvement in ankle mobility and overall stability when ambulating. Continue to progress mobility and strength nv.      Plan: Continue per plan of care.      Precautions: none      Manuals 6/6 6/13 3/14 3/21 3/28 4/4 4/11 4/18 4/25 5/16   Right Ankle/foot PROM/mobs RN RN RN RN RN RN RN RN RN RN   Left Ankle/Foot                                       Neuro Re-Ed             Ankle DF/PF             Ankle Inv Iso   10x5'' 10x5'' 10x5'' 10x5''       TA SLR   10 on R, 8 on L X10 ea X10 ea X10 ea       Bridges   x10 2x10 2x10 2x10       Ankle balance board 10x a/p, side 10x a/p, side X10 a/p, side X10 a/p, side X10 a/p, side X10 a/p, side X10 a/p, side X10 a/p, side X10 a/p, side 10x ap, side   Semi tandem stance - EC R ant 2x20'' R ant 2x20''  15''x3 ea 15''x3 ea 15''x3 ea 15'x3 15''x3 15x3'' R ant 2x20\"   Wobble board standing 2x10 a/p, side 2x10 a/p, side   nv 2x10 a/p, side 2x10 a/p, side 2x10 a/p, side 2x10 a/p, side 2x10 ap, side   Standing tandem stance      nv       NBOS EO/EC 3x15'' w/ head turns (L/R, U/D) 3x15'' w/ head turns (L/R, U/D)    nv 3x15'' 3x15'' w/ head turns 3x15'' w/ head turns 3x15'' w/ head turns (L/R, U/D)   Tandem " walking 4 laps 10' 4 laps       2 laps 10' 2 laps 10' 8 laps 10'   Standing calf s' 10x10'' ea 10x10'' ea                                                  Ther Ex             Balance assessment             Ankle IV PROM             seated HR 2x10 standing 2x10 standing 2x10 9 lb kb 2x10 9 lb kb   2x10 standing 2x10 standing 2x10 standing 2x10 standing   Seated DF/PF  2x10 Black TB ea 2x10 Black TB 2x10 GTB 2x10 BTB ea 2x10 BTB ea 2x10 BTB ea 2x10 Black TB ea 2x10 Black TB ea nv   HEP          Gastroc and Plantar fascia towel stretch                                          Ther Activity                                       Gait Training                                       Modalities

## 2024-06-17 ENCOUNTER — TELEPHONE (OUTPATIENT)
Age: 69
End: 2024-06-17

## 2024-06-17 NOTE — TELEPHONE ENCOUNTER
Sandy called in because she was taking antibiotics for an ear infection. She stop taking them last Friday but now her ear is starting to hurt again. Sandy would like to know what should she do next from .

## 2024-06-19 ENCOUNTER — OFFICE VISIT (OUTPATIENT)
Dept: FAMILY MEDICINE CLINIC | Facility: CLINIC | Age: 69
End: 2024-06-19
Payer: MEDICARE

## 2024-06-19 VITALS
DIASTOLIC BLOOD PRESSURE: 76 MMHG | HEART RATE: 82 BPM | SYSTOLIC BLOOD PRESSURE: 122 MMHG | WEIGHT: 174 LBS | BODY MASS INDEX: 30.83 KG/M2 | TEMPERATURE: 97.5 F | OXYGEN SATURATION: 99 % | HEIGHT: 63 IN | RESPIRATION RATE: 18 BRPM

## 2024-06-19 DIAGNOSIS — T78.40XA ALLERGY, INITIAL ENCOUNTER: ICD-10-CM

## 2024-06-19 DIAGNOSIS — H40.89 OTHER GLAUCOMA OF BOTH EYES: ICD-10-CM

## 2024-06-19 DIAGNOSIS — H69.91 EUSTACHIAN TUBE DYSFUNCTION, RIGHT: Primary | ICD-10-CM

## 2024-06-19 PROCEDURE — 99213 OFFICE O/P EST LOW 20 MIN: CPT | Performed by: FAMILY MEDICINE

## 2024-06-19 PROCEDURE — G2211 COMPLEX E/M VISIT ADD ON: HCPCS | Performed by: FAMILY MEDICINE

## 2024-06-19 RX ORDER — DORZOLAMIDE HYDROCHLORIDE AND TIMOLOL MALEATE 20; 5 MG/ML; MG/ML
1 SOLUTION/ DROPS OPHTHALMIC EVERY 12 HOURS
COMMUNITY
Start: 2024-06-07

## 2024-06-19 RX ORDER — BRIMONIDINE TARTRATE 1.5 MG/ML
1 SOLUTION/ DROPS OPHTHALMIC 3 TIMES DAILY
COMMUNITY
Start: 2024-06-07

## 2024-06-19 RX ORDER — FLUTICASONE PROPIONATE 50 MCG
2 SPRAY, SUSPENSION (ML) NASAL DAILY
Qty: 18.2 ML | Refills: 1 | Status: SHIPPED | OUTPATIENT
Start: 2024-06-19

## 2024-06-19 NOTE — PROGRESS NOTES
"Ambulatory Visit  Name: Sandy Mcgovern      : 1955      MRN: [id number]  Encounter Provider: Vero Peres DO  Encounter Date: 2024   Encounter department: [facility] PRIMARY CARE    Assessment & Plan   1. Eustachian tube dysfunction, right  Reassured patient that her TM was entirely normal. No infection or fluid.   Recommend she use flonase nasal spray in AM  2. Allergy, initial encounter  -     fluticasone (FLONASE) 50 mcg/act nasal spray; 2 sprays into each nostril daily  3. Other glaucoma of both eyes  Seeing Dr Barrera and recently had laser surgery       History of Present Illness     HPI  Patient is a 68 year old female who is being seen today for complaint of right ear feeling blocked.     She was seen at [facility] urgent care on 6/10/24 and was treated  for right otitis media with zithromax.     She had a lot of pain in right ear when seen in urgent care. The pain has resolved and has no drainage from the ear today. States that her right ear just feels blocked.     Some mild nasal congestion.   Review of Systems  Past Medical History:   Diagnosis Date   • Cough     23 Deals with dry cough pt reports feels its a stress reaction the cough\"   • History of umbilical hernia    • Obesity    • Papanicolaou smear 2020    neg   • Rectocele    • Sinus congestion     23 Intermitent sinus issues per pt - takes antihistamine as needed   • Uterine prolapse      Past Surgical History:   Procedure Laterality Date   • COLONOSCOPY     • DILATION AND EVACUATION  1980    x7   • EGD     • EXAMINATION UNDER ANESTHESIA N/A 2023    Procedure: EXAM UNDER ANESTHESIA (EUA);  Surgeon: Jeremiah Peres MD;  Location:  MAIN OR;  Service: Gynecology   • HERNIA REPAIR     • MAMMO (HISTORICAL) Bilateral 2020    [facility] BI-RADS 2 Benign findings Scattered area fibroglandular density; 25% to 50% glandular breast tissue   • NEUROPLASTY / TRANSPOSITION MEDIAN NERVE AT CARPAL " TUNNEL  2012   • MI DILATION & CURETTAGE DX&/THER NONOBSTETRIC N/A 12/12/2023    Procedure: DILATATION AND CURETTAGE (D&C);  Surgeon: Jeremiah Peres MD;  Location:  MAIN OR;  Service: Gynecology     Family History   Problem Relation Age of Onset   • Breast cancer Mother 94   • Stroke Father    • Heart disease Father    • Diabetes Father    • Hypertension Maternal Grandmother    • Hypertension Paternal Grandmother    • Ovarian cancer Neg Hx    • Colon cancer Neg Hx    • Uterine cancer Neg Hx    • Anesthesia problems Neg Hx      Social History     Tobacco Use   • Smoking status: Never     Passive exposure: Never   • Smokeless tobacco: Never   • Tobacco comments:     Never a smoker or any tobacco use per pt    Vaping Use   • Vaping status: Never Used   Substance and Sexual Activity   • Alcohol use: Not Currently     Comment: No current alcohol use within the last 6 mos per pt   • Drug use: Never     Comment: Denies any drug use per pt   • Sexual activity: Not Currently     Birth control/protection: Post-menopausal     Comment: Not active at this time per pt     Current Outpatient Medications on File Prior to Visit   Medication Sig   • brimonidine (ALPHAGAN P) 0.15 % ophthalmic solution Administer 1 drop to the right eye 3 (three) times a day   • Calcium Carb-Cholecalciferol 600-800 MG-UNIT TABS Take 1 tablet by mouth in the morning   • dorzolamide-timolol (COSOPT) 2-0.5 % ophthalmic solution Administer 1 drop to the right eye every 12 (twelve) hours   • estradiol (ESTRACE) 0.1 mg/g vaginal cream Insert 1 g into the vagina 2 (two) times a week Daily x 2 weeks, then 2-3 times weekly. Use lowest frequency necessary to alleviate symptoms   • ibuprofen (MOTRIN) 200 mg tablet Take 400 mg by mouth in the morning   • Oxyquinoline-Sod Lauryl Sulf (Trimo-Gu) 0.025-0.01 % GEL Insert 2 g into the vagina 2 (two) times a week   • pravastatin (PRAVACHOL) 20 mg tablet TAKE 1 TABLET BY MOUTH EVERY DAY   • [DISCONTINUED]  "Chlorphen-PE-Acetaminophen (CORICIDIN D COLD/FLU/SINUS PO) Take by mouth if needed Takes at night (Patient not taking: Reported on 6/19/2024)     Allergies   Allergen Reactions   • Naproxen Anaphylaxis     Per pt throat swelled up and eyes swelling - could barely breathe   • Amphotericin B Rash   • Ampicillin Rash   • Penicillins Rash     Immunization History   Administered Date(s) Administered   • COVID-19 PFIZER VACCINE 0.3 ML IM 03/18/2021, 04/12/2021   • COVID-19 Pfizer vac (Conrad-sucrose, gray cap) 12 yr+ IM 08/19/2022   • INFLUENZA 10/19/2007, 10/31/2008   • Influenza, high dose seasonal 0.7 mL 11/05/2020, 11/16/2021, 11/18/2022   • Influenza, seasonal, injectable 10/27/2014   • Pneumococcal Conjugate 13-Valent 11/05/2020   • Pneumococcal Polysaccharide PPV23 10/27/2014, 11/16/2021   • Tdap 05/27/2009, 12/03/2020     Objective     /76 (BP Location: Left arm, Patient Position: Sitting, Cuff Size: Adult)   Pulse 82   Temp 97.5 °F (36.4 °C) (Tympanic)   Resp 18   Ht 5' 2.6\" (1.59 m)   Wt 78.9 kg (174 lb)   LMP  (LMP Unknown)   SpO2 99%   BMI 31.22 kg/m²     Physical Exam  Vitals and nursing note reviewed.   Constitutional:       General: She is not in acute distress.     Appearance: Normal appearance. She is not ill-appearing, toxic-appearing or diaphoretic.   HENT:      Head: Normocephalic and atraumatic.      Right Ear: Tympanic membrane normal.      Left Ear: Tympanic membrane normal.      Nose: Nose normal.      Mouth/Throat:      Mouth: Mucous membranes are moist.      Pharynx: No oropharyngeal exudate or posterior oropharyngeal erythema.   Eyes:      Extraocular Movements: Extraocular movements intact.      Conjunctiva/sclera: Conjunctivae normal.      Pupils: Pupils are equal, round, and reactive to light.   Cardiovascular:      Rate and Rhythm: Normal rate and regular rhythm.      Heart sounds: No murmur heard.  Pulmonary:      Effort: Pulmonary effort is normal.      Breath sounds: Normal " breath sounds.   Musculoskeletal:      Cervical back: Normal range of motion and neck supple.   Lymphadenopathy:      Cervical: Cervical adenopathy (right anterior cervical adenopathy) present.   Neurological:      Mental Status: She is alert.   Psychiatric:         Mood and Affect: Mood normal.   Administrative Statements

## 2024-06-20 ENCOUNTER — OFFICE VISIT (OUTPATIENT)
Dept: PHYSICAL THERAPY | Facility: CLINIC | Age: 69
End: 2024-06-20
Payer: MEDICARE

## 2024-06-20 DIAGNOSIS — M76.822 POSTERIOR TIBIAL TENDON DYSFUNCTION (PTTD) OF LEFT LOWER EXTREMITY: ICD-10-CM

## 2024-06-20 DIAGNOSIS — M21.42 PES PLANUS OF LEFT FOOT: ICD-10-CM

## 2024-06-20 DIAGNOSIS — M19.071 ARTHRITIS OF RIGHT ANKLE: Primary | ICD-10-CM

## 2024-06-20 PROCEDURE — 97112 NEUROMUSCULAR REEDUCATION: CPT | Performed by: PHYSICAL THERAPIST

## 2024-06-20 PROCEDURE — 97140 MANUAL THERAPY 1/> REGIONS: CPT | Performed by: PHYSICAL THERAPIST

## 2024-06-20 PROCEDURE — 97110 THERAPEUTIC EXERCISES: CPT | Performed by: PHYSICAL THERAPIST

## 2024-06-20 NOTE — PROGRESS NOTES
"Daily Note     Today's date: 2024  Patient name: Sandy Mcgovern  : 1955  MRN: [id number]  Referring provider: Bekah Isaacs DO  Dx:   Encounter Diagnosis     ICD-10-CM    1. Arthritis of right ankle  M19.071       2. Posterior tibial tendon dysfunction (PTTD) of left lower extremity  M76.822       3. Pes planus of left foot  M21.42                      Subjective: Sandy explains that her ankles have continued to feel good, she always feels good relief when she gets stretched at PT.       Objective: See treatment diary below      Assessment: Tolerated treatment well. Patient demonstrated fatigue post treatment and exhibited good technique with therapeutic exercises. Sandy continues to improve with balance with head turns, continued improvement in ankle mobility and overall stability when ambulating. Continue to progress mobility and strength nv.       Plan: Continue per plan of care.      Precautions: none      Manuals    Right Ankle/foot PROM/mobs RN RN RN      RN RN   Left Ankle/Foot                                       Neuro Re-Ed             Ankle DF/PF             Ankle Inv Iso             TA SLR             Bridges             Ankle balance board 10x a/p, side 10x a/p, side X10 a/p, side large [place]      X10 a/p, side 10x ap, side   Semi tandem stance - EC R ant 2x20'' R ant 2x20'' R ant 2x20''      15x3'' R ant 2x20\"   Wobble board standing 2x10 a/p, side 2x10 a/p, side 2x10 a/p, side      2x10 a/p, side 2x10 ap, side   Standing tandem stance             NBOS EO/EC 3x15'' w/ head turns (L/R, U/D) 3x15'' w/ head turns (L/R, U/D) 3x15'' w/ head turns (L/R, U/D)      3x15'' w/ head turns 3x15'' w/ head turns (L/R, U/D)   Tandem walking 4 laps 10' 4 laps  4 laps      2 laps 10' 8 laps 10'   Standing calf s' 10x10'' ea 10x10'' ea 10x10'' ea                                                 Ther Ex             Balance assessment             Ankle IV PROM           "   HR/TR 2x10 standing 2x10 standing 2x10       2x10 standing 2x10 standing   Seated DF/PF  2x10 Black TB ea 2x10 Black TB      2x10 Black TB ea nv   HEP          Gastroc and Plantar fascia towel stretch                                          Ther Activity                                       Gait Training                                       Modalities

## 2024-06-27 ENCOUNTER — OFFICE VISIT (OUTPATIENT)
Dept: PHYSICAL THERAPY | Facility: CLINIC | Age: 69
End: 2024-06-27
Payer: MEDICARE

## 2024-06-27 DIAGNOSIS — M21.42 PES PLANUS OF LEFT FOOT: ICD-10-CM

## 2024-06-27 DIAGNOSIS — M76.822 POSTERIOR TIBIAL TENDON DYSFUNCTION (PTTD) OF LEFT LOWER EXTREMITY: ICD-10-CM

## 2024-06-27 DIAGNOSIS — M19.071 ARTHRITIS OF RIGHT ANKLE: Primary | ICD-10-CM

## 2024-06-27 PROCEDURE — 97110 THERAPEUTIC EXERCISES: CPT | Performed by: PHYSICAL THERAPIST

## 2024-06-27 PROCEDURE — 97140 MANUAL THERAPY 1/> REGIONS: CPT | Performed by: PHYSICAL THERAPIST

## 2024-06-27 PROCEDURE — 97112 NEUROMUSCULAR REEDUCATION: CPT | Performed by: PHYSICAL THERAPIST

## 2024-06-27 NOTE — PROGRESS NOTES
"Daily Note     Today's date: 2024  Patient name: Sandy Mcgovern  : 1955  MRN: [id number]  Referring provider: Bekah Isaacs DO  Dx:   Encounter Diagnosis     ICD-10-CM    1. Arthritis of right ankle  M19.071       2. Posterior tibial tendon dysfunction (PTTD) of left lower extremity  M76.822       3. Pes planus of left foot  M21.42                      Subjective: Sandy explains that her ankles have continued to feel good, she always feels good relief when she gets stretched at PT.       Objective: See treatment diary below      Assessment: Tolerated treatment well. Patient demonstrated fatigue post treatment and exhibited good technique with therapeutic exercises. Sandy continues to improve with balance with head turns, continued improvement in ankle mobility and overall stability when ambulating. Continue to progress mobility and strength nv.       Plan: Continue per plan of care.      Precautions: none      Manuals    Right Ankle/foot PROM/mobs RN RN RN RN     RN RN   Left Ankle/Foot                                       Neuro Re-Ed             Ankle DF/PF             Ankle Inv Iso             TA SLR             Bridges             Ankle balance board 10x a/p, side 10x a/p, side X10 a/p, side large [place] X10 a/p, side large [place]     X10 a/p, side 10x ap, side   Semi tandem stance - EC R ant 2x20'' R ant 2x20'' R ant 2x20'' R ant 2x20''     15x3'' R ant 2x20\"   Wobble board standing 2x10 a/p, side 2x10 a/p, side 2x10 a/p, side 2x10 a/p, side     2x10 a/p, side 2x10 ap, side   Standing tandem stance             NBOS EO/EC 3x15'' w/ head turns (L/R, U/D) 3x15'' w/ head turns (L/R, U/D) 3x15'' w/ head turns (L/R, U/D) 3x15'' w/ head turns (L/R, U/D)     3x15'' w/ head turns 3x15'' w/ head turns (L/R, U/D)   Tandem walking 4 laps 10' 4 laps  4 laps 4 laps     2 laps 10' 8 laps 10'   Standing calf s' 10x10'' ea 10x10'' ea 10x10'' ea 10x10'' ea                       "                          Ther Ex             Balance assessment             Ankle IV PROM             HR/TR 2x10 standing 2x10 standing 2x10  2x10     2x10 standing 2x10 standing   Seated DF/PF  2x10 Black TB ea 2x10 Black TB 2x10 Black TB     2x10 Black TB ea nv   HEP          Gastroc and Plantar fascia towel stretch                                          Ther Activity                                       Gait Training                                       Modalities

## 2024-07-03 DIAGNOSIS — N95.2 VAGINAL ATROPHY: ICD-10-CM

## 2024-07-03 RX ORDER — ESTRADIOL 0.1 MG/G
1 CREAM VAGINAL 2 TIMES WEEKLY
Qty: 42.5 G | Refills: 1 | Status: SHIPPED | OUTPATIENT
Start: 2024-07-04

## 2024-07-11 ENCOUNTER — OFFICE VISIT (OUTPATIENT)
Dept: PHYSICAL THERAPY | Facility: CLINIC | Age: 69
End: 2024-07-11
Payer: MEDICARE

## 2024-07-11 DIAGNOSIS — M21.42 PES PLANUS OF LEFT FOOT: ICD-10-CM

## 2024-07-11 DIAGNOSIS — M19.071 ARTHRITIS OF RIGHT ANKLE: Primary | ICD-10-CM

## 2024-07-11 DIAGNOSIS — M76.822 POSTERIOR TIBIAL TENDON DYSFUNCTION (PTTD) OF LEFT LOWER EXTREMITY: ICD-10-CM

## 2024-07-11 DIAGNOSIS — T78.40XA ALLERGY, INITIAL ENCOUNTER: ICD-10-CM

## 2024-07-11 PROCEDURE — 97112 NEUROMUSCULAR REEDUCATION: CPT | Performed by: PHYSICAL THERAPIST

## 2024-07-11 PROCEDURE — 97110 THERAPEUTIC EXERCISES: CPT | Performed by: PHYSICAL THERAPIST

## 2024-07-11 PROCEDURE — 97140 MANUAL THERAPY 1/> REGIONS: CPT | Performed by: PHYSICAL THERAPIST

## 2024-07-11 NOTE — PROGRESS NOTES
"Daily Note     Today's date: 2024  Patient name: Sandy Mcgovern  : 1955  MRN: [id number]  Referring provider: Bekah Isaacs DO  Dx:   Encounter Diagnosis     ICD-10-CM    1. Arthritis of right ankle  M19.071       2. Posterior tibial tendon dysfunction (PTTD) of left lower extremity  M76.822       3. Pes planus of left foot  M21.42                      Subjective: Sandy explains that her ankles have been feeling okay, however, she fell last week due to her cane catching the carpet leading into [place]. She arrives without a cane today, walking well and stable.      Objective: See treatment diary below      Assessment: Tolerated treatment well. Patient demonstrated fatigue post treatment and exhibited good technique with therapeutic exercises. Sandy had improved mobility and endurance during todays session. Her ambulation and speed of ambulation is increased without her cane. Continue to progress mobility and strength nv.          Plan: Continue per plan of care.      Precautions: none      Manuals    Bilateral Ankle/foot PROM/mobs RN RN RN RN RN    RN RN   Left Ankle/Foot                                       Neuro Re-Ed             Ankle DF/PF             Ankle Inv Iso             TA SLR             Bridges             Ankle balance board 10x a/p, side 10x a/p, side X10 a/p, side large [place] X10 a/p, side large [place] X10 a/p, side large [place]    X10 a/p, side 10x ap, side   Semi tandem stance - EC R ant 2x20'' R ant 2x20'' R ant 2x20'' R ant 2x20''     15x3'' R ant 2x20\"   Wobble board standing 2x10 a/p, side 2x10 a/p, side 2x10 a/p, side 2x10 a/p, side 2x10 a/p, side    2x10 a/p, side 2x10 ap, side   Standing tandem stance             NBOS EO/EC 3x15'' w/ head turns (L/R, U/D) 3x15'' w/ head turns (L/R, U/D) 3x15'' w/ head turns (L/R, U/D) 3x15'' w/ head turns (L/R, U/D) 3x15'' w/ head turns (L/R, U/D)    3x15'' w/ head turns 3x15'' w/ head turns (L/R, U/D) "   Tandem walking 4 laps 10' 4 laps  4 laps 4 laps 4 laps    2 laps 10' 8 laps 10'   Standing calf s' 10x10'' ea 10x10'' ea 10x10'' ea 10x10'' ea 10x10'' ea                                               Ther Ex             Balance assessment             Ankle IV PROM             HR/TR 2x10 standing 2x10 standing 2x10  2x10 2x10    2x10 standing 2x10 standing   Seated DF/PF  2x10 Black TB ea 2x10 Black TB 2x10 Black TB 2x10 Black TB    2x10 Black TB ea nv   HEP          Gastroc and Plantar fascia towel stretch                                          Ther Activity                                       Gait Training                                       Modalities

## 2024-07-12 RX ORDER — FLUTICASONE PROPIONATE 50 MCG
SPRAY, SUSPENSION (ML) NASAL
Qty: 48 ML | Refills: 1 | Status: SHIPPED | OUTPATIENT
Start: 2024-07-12

## 2024-07-18 ENCOUNTER — OFFICE VISIT (OUTPATIENT)
Dept: PHYSICAL THERAPY | Facility: CLINIC | Age: 69
End: 2024-07-18
Payer: MEDICARE

## 2024-07-18 DIAGNOSIS — M76.822 POSTERIOR TIBIAL TENDON DYSFUNCTION (PTTD) OF LEFT LOWER EXTREMITY: ICD-10-CM

## 2024-07-18 DIAGNOSIS — M21.42 PES PLANUS OF LEFT FOOT: ICD-10-CM

## 2024-07-18 DIAGNOSIS — M19.071 ARTHRITIS OF RIGHT ANKLE: Primary | ICD-10-CM

## 2024-07-18 PROCEDURE — 97140 MANUAL THERAPY 1/> REGIONS: CPT | Performed by: PHYSICAL THERAPIST

## 2024-07-18 PROCEDURE — 97110 THERAPEUTIC EXERCISES: CPT | Performed by: PHYSICAL THERAPIST

## 2024-07-18 PROCEDURE — 97112 NEUROMUSCULAR REEDUCATION: CPT | Performed by: PHYSICAL THERAPIST

## 2024-07-18 NOTE — PROGRESS NOTES
"Daily Note     Today's date: 2024  Patient name: Sandy Mcgovern  : 1955  MRN: [id number]  Referring provider: Bekah Isaacs DO  Dx:   Encounter Diagnosis     ICD-10-CM    1. Arthritis of right ankle  M19.071       2. Posterior tibial tendon dysfunction (PTTD) of left lower extremity  M76.822       3. Pes planus of left foot  M21.42                      Subjective: Sandy explains that her feet continue to feel great. She notes that her foot doctor was very pleased with her walking and mobility.       Objective: See treatment diary below      Assessment: Tolerated treatment well. Patient demonstrated fatigue post treatment and exhibited good technique with therapeutic exercises.  Sandy had improved mobility and endurance during todays session. Her ambulation and speed of ambulation is increased without her cane. Continue to progress mobility and strength nv.        Plan: Continue per plan of care.      Precautions: none      Manuals  [id number]      Bilateral Ankle/foot PROM/mobs RN RN RN RN RN RN   RN RN   Left Ankle/Foot                                       Neuro Re-Ed             Ankle DF/PF             Ankle Inv Iso             TA SLR             Bridges             Ankle balance board 10x a/p, side 10x a/p, side X10 a/p, side large [place] X10 a/p, side large [place] X10 a/p, side large [place] X10 a/p, side large [place]   X10 a/p, side 10x ap, side   Semi tandem stance - EC R ant 2x20'' R ant 2x20'' R ant 2x20'' R ant 2x20''     15x3'' R ant 2x20\"   Wobble board standing 2x10 a/p, side 2x10 a/p, side 2x10 a/p, side 2x10 a/p, side 2x10 a/p, side 2x10 a/p, side   2x10 a/p, side 2x10 ap, side   Standing tandem stance             NBOS EO/EC 3x15'' w/ head turns (L/R, U/D) 3x15'' w/ head turns (L/R, U/D) 3x15'' w/ head turns (L/R, U/D) 3x15'' w/ head turns (L/R, U/D) 3x15'' w/ head turns (L/R, U/D) 3x15'' w/ head turns (L/R, U/D)   3x15'' w/ head turns 3x15'' w/ head turns " (L/R, U/D)   Tandem walking 4 laps 10' 4 laps  4 laps 4 laps 4 laps 4 laps   2 laps 10' 8 laps 10'   Standing calf s' 10x10'' ea 10x10'' ea 10x10'' ea 10x10'' ea 10x10'' ea 10x10'' ea                                              Ther Ex             Balance assessment             Ankle IV PROM             HR/TR 2x10 standing 2x10 standing 2x10  2x10 2x10 2x10    2x10 standing 2x10 standing   Seated DF/PF  2x10 Black TB ea 2x10 Black TB 2x10 Black TB 2x10 Black TB 2x10 Black TB   2x10 Black TB ea nv   HEP          Gastroc and Plantar fascia towel stretch                                          Ther Activity                                       Gait Training                                       Modalities

## 2024-07-25 ENCOUNTER — OFFICE VISIT (OUTPATIENT)
Dept: PHYSICAL THERAPY | Facility: CLINIC | Age: 69
End: 2024-07-25
Payer: MEDICARE

## 2024-07-25 DIAGNOSIS — M19.071 ARTHRITIS OF RIGHT ANKLE: Primary | ICD-10-CM

## 2024-07-25 DIAGNOSIS — M21.42 PES PLANUS OF LEFT FOOT: ICD-10-CM

## 2024-07-25 DIAGNOSIS — M76.822 POSTERIOR TIBIAL TENDON DYSFUNCTION (PTTD) OF LEFT LOWER EXTREMITY: ICD-10-CM

## 2024-07-25 PROCEDURE — 97140 MANUAL THERAPY 1/> REGIONS: CPT | Performed by: PHYSICAL THERAPIST

## 2024-07-25 PROCEDURE — 97110 THERAPEUTIC EXERCISES: CPT | Performed by: PHYSICAL THERAPIST

## 2024-07-25 PROCEDURE — 97112 NEUROMUSCULAR REEDUCATION: CPT | Performed by: PHYSICAL THERAPIST

## 2024-07-25 NOTE — PROGRESS NOTES
"Daily Note     Today's date: 2024  Patient name: Sandy Mcgovern  : 1955  MRN: [id number]  Referring provider: Bekah Isaacs DO  Dx:   Encounter Diagnosis     ICD-10-CM    1. Arthritis of right ankle  M19.071       2. Posterior tibial tendon dysfunction (PTTD) of left lower extremity  M76.822       3. Pes planus of left foot  M21.42                      Subjective: Sandy explains that she is feeling well today.       Objective: See treatment diary below      Assessment: Tolerated treatment well. Patient demonstrated fatigue post treatment. Unable to complete recumbent bike this session due to knee valgus/pain and resting ankle position hitting the wheel of the bike. She continues to respond well to standing TE with improved tolerance to standing, continue to emphasize endurance and WB tolerance in upcoming visits.       Plan: Continue per plan of care.      Precautions: none      Manuals  [id number]    Bilateral Ankle/foot PROM/mobs RN RN RN RN RN RN RN  RN RN   Left Ankle/Foot                                       Neuro Re-Ed             Ankle DF/PF             Ankle Inv Iso             TA SLR             Bridges             Ankle balance board 10x a/p, side 10x a/p, side X10 a/p, side large [place] X10 a/p, side large [place] X10 a/p, side large [place] X10 a/p, side large [place] X10 a/p, side large [place]  X10 a/p, side 10x ap, side   Semi tandem stance - EC R ant 2x20'' R ant 2x20'' R ant 2x20'' R ant 2x20''     15x3'' R ant 2x20\"   Wobble board standing 2x10 a/p, side 2x10 a/p, side 2x10 a/p, side 2x10 a/p, side 2x10 a/p, side 2x10 a/p, side 2x10 a/p, side  2x10 a/p, side 2x10 ap, side   Standing tandem stance             NBOS EO/EC 3x15'' w/ head turns (L/R, U/D) 3x15'' w/ head turns (L/R, U/D) 3x15'' w/ head turns (L/R, U/D) 3x15'' w/ head turns (L/R, U/D) 3x15'' w/ head turns (L/R, U/D) 3x15'' w/ head turns (L/R, U/D) 3x15'' w/ head turns (L/R, U/D)  3x15'' w/ " head turns 3x15'' w/ head turns (L/R, U/D)   Tandem walking 4 laps 10' 4 laps  4 laps 4 laps 4 laps 4 laps 4 laps  2 laps 10' 8 laps 10'   Standing calf s' 10x10'' ea 10x10'' ea 10x10'' ea 10x10'' ea 10x10'' ea 10x10'' ea 10x10'' ea                                             Ther Ex             Balance assessment             Ankle IV PROM             HR/TR 2x10 standing 2x10 standing 2x10  2x10 2x10 2x10  2x10  2x10 standing 2x10 standing   Seated DF/PF  2x10 Black TB ea 2x10 Black TB 2x10 Black TB 2x10 Black TB 2x10 Black TB 2x01 Black TB  2x10 Black TB ea nv   HEP          Gastroc and Plantar fascia towel stretch   Bike (Cardiovascular Endurance)         P!                                Ther Activity                                       Gait Training                                       Modalities

## 2024-08-07 ENCOUNTER — APPOINTMENT (OUTPATIENT)
Dept: PHYSICAL THERAPY | Facility: CLINIC | Age: 69
End: 2024-08-07
Payer: MEDICARE

## 2024-08-09 ENCOUNTER — OFFICE VISIT (OUTPATIENT)
Dept: OBGYN CLINIC | Facility: CLINIC | Age: 69
End: 2024-08-09

## 2024-08-09 VITALS
SYSTOLIC BLOOD PRESSURE: 120 MMHG | HEIGHT: 62 IN | BODY MASS INDEX: 32.13 KG/M2 | DIASTOLIC BLOOD PRESSURE: 76 MMHG | WEIGHT: 174.6 LBS

## 2024-08-09 DIAGNOSIS — N81.3 UTERINE PROCIDENTIA: ICD-10-CM

## 2024-08-09 DIAGNOSIS — N95.2 VAGINAL ATROPHY: ICD-10-CM

## 2024-08-09 DIAGNOSIS — Z96.0 VAGINAL PESSARY IN SITU: Primary | ICD-10-CM

## 2024-08-09 RX ORDER — ESTRADIOL 0.1 MG/G
1 CREAM VAGINAL 2 TIMES WEEKLY
Qty: 42.5 G | Refills: 1 | Status: SHIPPED | OUTPATIENT
Start: 2024-08-12

## 2024-08-09 RX ORDER — OXYQUINOLINE SULFATE AND SODIUM LAURYL SULFATE .25; .1 MG/G; MG/G
2 JELLY VAGINAL 2 TIMES WEEKLY
Qty: 113.4 G | Refills: 1 | Status: SHIPPED | OUTPATIENT
Start: 2024-08-12

## 2024-08-09 NOTE — ASSESSMENT & PLAN NOTE
- Continue Trimo-Gu twice weekly. Rx refilled today.  - Pessary removed, cleaned, and replaced.   - Given episode of spotting yesterday, will return to office in 1 month for follow up.  - Discussed that given her recurrent issues with spotting requiring prior D&C for endometrial evaluation, may need to consider definitive surgical management. She is agreeable to this, but would like to wait until after her upcoming ocular surgery.

## 2024-08-09 NOTE — PROGRESS NOTES
[facility] [address], [address], [place] [address]    Assessment/Plan:  1. Vaginal pessary in situ  Assessment & Plan:  - Continue Trimo-Gu twice weekly. Rx refilled today.  - Pessary removed, cleaned, and replaced.   - Given episode of spotting yesterday, will return to office in 1 month for follow up.  - Discussed that given her recurrent issues with spotting requiring prior D&C for endometrial evaluation, may need to consider definitive surgical management. She is agreeable to this, but would like to wait until after her upcoming ocular surgery.   Orders:  -     Oxyquinoline-Sod Lauryl Sulf (Trimo-Gu) 0.025-0.01 % GEL; Insert 2 g into the vagina 2 (two) times a week  2. Vaginal atrophy  Assessment & Plan:  - Continue estrace cream twice weekly. Rx refilled today.   Orders:  -     estradiol (ESTRACE) 0.1 mg/g vaginal cream; Insert 1 g into the vagina 2 (two) times a week Daily x 2 weeks, then 2-3 times weekly. Use lowest frequency necessary to alleviate symptoms  3. Uterine procidentia    Pessary    Date/Time: 2024 9:45 AM    Performed by: Jeremiah Peres MD  Authorized by: Jeremiah Peres MD  Universal Protocol:  Consent given by: patient  Patient understanding: patient states understanding of the procedure being performed  Patient identity confirmed: verbally with patient    Indication:     Indication for pessary: uterine prolapse, cystocele and rectocele    Pre-procedure:     Pessary procedure type:  Cleaning/check  Problems:     Pessary complications comment:  Isolated episode of spotting  Procedure:     Pessary type:  Ring w/ support    Pessary size:  7    Patient tolerance of procedure:  Tolerated well, no immediate complications        Subjective:   Sandy Mcgovern is a 68 y.o.  .  CC:   Chief Complaint   Patient presents with    Personal Problem     Pt states in the office for a pessary cleaning, spotting last night, bright red blood        HPI: Patient presents for  "routine pessary maintenance. She reports an isolated episode of bright red spotting last night, which resolved. None today. She reports that this is similar to her prior spotting episodes and was accompanied by cramping. Otherwise, she is without complaint.     ROS: Negative except as noted in HPI    No LMP recorded (lmp unknown). Patient is postmenopausal.       She  reports that she is not currently sexually active. She reports using the following method of birth control/protection: Post-menopausal.       The following portions of the patient's history were reviewed and updated as appropriate:   Past Medical History:   Diagnosis Date    Cough     12/6/23 Deals with dry cough pt reports feels its a stress reaction the cough\"    Glaucoma     both eyes    History of umbilical hernia     Obesity     Papanicolaou smear 01/27/2020    neg    Rectocele     Sinus congestion     12/6/23 Intermitent sinus issues per pt - takes antihistamine as needed    Uterine prolapse 2020     Past Surgical History:   Procedure Laterality Date    COLONOSCOPY      DILATION AND EVACUATION  1980    x7    EGD      EXAMINATION UNDER ANESTHESIA N/A 12/12/2023    Procedure: EXAM UNDER ANESTHESIA (EUA);  Surgeon: Jeremiah Peres MD;  Location: [facility];  Service: Gynecology    HERNIA REPAIR  2013    MAMMO (HISTORICAL) Bilateral 01/30/2020    [facility] BI-RADS 2 Benign findings Scattered area fibroglandular density; 25% to 50% glandular breast tissue    NEUROPLASTY / TRANSPOSITION MEDIAN NERVE AT CARPAL TUNNEL  2012    CT DILATION & CURETTAGE DX&/THER NONOBSTETRIC N/A 12/12/2023    Procedure: DILATATION AND CURETTAGE (D&C);  Surgeon: Jeremiah Peres MD;  Location: [facility];  Service: Gynecology     Family History   Problem Relation Age of Onset    Breast cancer Mother 94    Stroke Father     Heart disease Father     Diabetes Father     Hypertension Maternal Grandmother     Hypertension Paternal Grandmother     Ovarian cancer Neg Hx     Colon cancer " Neg Hx     Uterine cancer Neg Hx     Anesthesia problems Neg Hx      Social History     Socioeconomic History    Marital status: /Civil Union     Spouse name: None    Number of children: 1    Years of education: None    Highest education level: None   Occupational History    None   Tobacco Use    Smoking status: Never     Passive exposure: Never    Smokeless tobacco: Never    Tobacco comments:     Never a smoker or any tobacco use per pt    Vaping Use    Vaping status: Never Used   Substance and Sexual Activity    Alcohol use: Not Currently     Comment: No current alcohol use within the last 6 mos per pt    Drug use: Never     Comment: Denies any drug use per pt    Sexual activity: Not Currently     Birth control/protection: Post-menopausal     Comment: Not active at this time per pt   Other Topics Concern    None   Social History Narrative    Exercises occasionally     Social Determinants of Health     Financial Resource Strain: Low Risk  (11/18/2022)    Overall Financial Resource Strain (CARDIA)     Difficulty of Paying Living Expenses: Not hard at all   Food Insecurity: Not on file   Transportation Needs: No Transportation Needs (11/18/2022)    PRAPARE - Transportation     Lack of Transportation (Medical): No     Lack of Transportation (Non-Medical): No   Physical Activity: Not on file   Stress: Not on file   Social Connections: Unknown (11/5/2020)    Social Connection and Isolation Panel [NHANES]     Frequency of Communication with Friends and Family: Not on file     Frequency of Social Gatherings with Friends and Family: Not on file     Attends Worship Services: Not on file     Active Member of Clubs or Organizations: Not on file     Attends Club or Organization Meetings: Not on file     Marital Status:    Intimate Partner Violence: Not At Risk (11/20/2023)    Humiliation, Afraid, Rape, and Kick questionnaire     Fear of Current or Ex-Partner: No     Emotionally Abused: No     Physically Abused:  "No     Sexually Abused: No   Housing Stability: Not on file     Outpatient Medications Marked as Taking for the 8/9/24 encounter (Office Visit) with Jeremiah Peres MD   Medication    brimonidine (ALPHAGAN P) 0.15 % ophthalmic solution    Calcium Carb-Cholecalciferol 600-800 MG-UNIT TABS    dorzolamide-timolol (COSOPT) 2-0.5 % ophthalmic solution    [START ON 8/12/2024] estradiol (ESTRACE) 0.1 mg/g vaginal cream    fluticasone (FLONASE) 50 mcg/act nasal spray    ibuprofen (MOTRIN) 200 mg tablet    Ibuprofen-Acetaminophen (ADVIL DUAL ACTION PO)    [START ON 8/12/2024] Oxyquinoline-Sod Lauryl Sulf (Trimo-Gu) 0.025-0.01 % GEL    pravastatin (PRAVACHOL) 20 mg tablet    [DISCONTINUED] estradiol (ESTRACE) 0.1 mg/g vaginal cream    [DISCONTINUED] Oxyquinoline-Sod Lauryl Sulf (Trimo-Gu) 0.025-0.01 % GEL     Allergies   Allergen Reactions    Naproxen Anaphylaxis     Per pt throat swelled up and eyes swelling - could barely breathe    Amphotericin B Rash    Ampicillin Rash    Penicillins Rash           Objective:  /76   Ht 5' 2\" (1.575 m)   Wt 79.2 kg (174 lb 9.6 oz)   LMP  (LMP Unknown)   BMI 31.93 kg/m²        Chaperone present? Yes: Amy Coughlin MA.    General Appearance: alert and oriented, in no acute distress.   Abdomen: Soft, non-tender, non-distended, no masses, no rebound or guarding.  Pelvic:       External genitalia: Normal appearance, no abnormal pigmentation, no lesions or masses. Normal [place]th[place]'s and [place]'s.      Urinary system: Urethral meatus normal, bladder non-tender.      Vaginal: Complete procidentia. Atrophic mucosa. No erosions. Normal-appearing physiologic discharge. No bleeding noted. Scant blood-tinged secretions on glove after bimanual exam.   Extremities: Normal range of motion.   Skin: normal, no rash or abnormalities  Neurologic: alert, oriented x3  Psychiatric: Appropriate affect, mood stable, cooperative with exam.        Jeremiah Peres MD  8/9/2024 10:30 AM  "

## 2024-08-15 ENCOUNTER — OFFICE VISIT (OUTPATIENT)
Dept: PHYSICAL THERAPY | Facility: CLINIC | Age: 69
End: 2024-08-15
Payer: MEDICARE

## 2024-08-15 DIAGNOSIS — M21.42 PES PLANUS OF LEFT FOOT: ICD-10-CM

## 2024-08-15 DIAGNOSIS — M19.071 ARTHRITIS OF RIGHT ANKLE: Primary | ICD-10-CM

## 2024-08-15 DIAGNOSIS — M76.822 POSTERIOR TIBIAL TENDON DYSFUNCTION (PTTD) OF LEFT LOWER EXTREMITY: ICD-10-CM

## 2024-08-15 PROCEDURE — 97112 NEUROMUSCULAR REEDUCATION: CPT | Performed by: PHYSICAL THERAPIST

## 2024-08-15 PROCEDURE — 97110 THERAPEUTIC EXERCISES: CPT | Performed by: PHYSICAL THERAPIST

## 2024-08-15 PROCEDURE — 97140 MANUAL THERAPY 1/> REGIONS: CPT | Performed by: PHYSICAL THERAPIST

## 2024-08-15 NOTE — PROGRESS NOTES
"Daily Note     Today's date: 8/15/2024  Patient name: Sandy Mcgovern  : 1955  MRN: [id number]  Referring provider: Bekah Isaacs DO  Dx:   Encounter Diagnosis     ICD-10-CM    1. Arthritis of right ankle  M19.071       2. Posterior tibial tendon dysfunction (PTTD) of left lower extremity  M76.822       3. Pes planus of left foot  M21.42                      Subjective: Sandy explains that she is feeling well today. She felt sore after missing last weeks appointment.      Objective: See treatment diary below      Assessment: Tolerated treatment well. Patient demonstrated fatigue post treatment. Sandy responded well to manuals and was notably more tight into dorsiflexion and through her midfoot, possible due to her fall last week. She continues to respond well to standing TE with improved tolerance to standing, continue to emphasize endurance, balance, and WB tolerance in upcoming visits.       Plan: Continue per plan of care.      Precautions: none      Manuals  718 7/25 8/15 4/25 5/16   Bilateral Ankle/foot PROM/mobs RN RN RN RN RN RN RN RN RN RN   Left Ankle/Foot                                       Neuro Re-Ed             Ankle DF/PF             Ankle Inv Iso             TA SLR             Bridges             Ankle balance board 10x a/p, side 10x a/p, side X10 a/p, side large [place] X10 a/p, side large [place] X10 a/p, side large [place] X10 a/p, side large [place] X10 a/p, side large [place] X10 a/p, side large [place] X10 a/p, side 10x ap, side   Semi tandem stance - EC R ant 2x20'' R ant 2x20'' R ant 2x20'' R ant 2x20''     15x3'' R ant 2x20\"   Wobble board standing 2x10 a/p, side 2x10 a/p, side 2x10 a/p, side 2x10 a/p, side 2x10 a/p, side 2x10 a/p, side 2x10 a/p, side 2x10, a/p, side 2x10 a/p, side 2x10 ap, side   Standing tandem stance             NBOS EO/EC 3x15'' w/ head turns (L/R, U/D) 3x15'' w/ head turns (L/R, U/D) 3x15'' w/ head turns (L/R, U/D) 3x15'' w/ head turns " (L/R, U/D) 3x15'' w/ head turns (L/R, U/D) 3x15'' w/ head turns (L/R, U/D) 3x15'' w/ head turns (L/R, U/D) 3x15'' w/ head turns (L/R, U/D) 3x15'' w/ head turns 3x15'' w/ head turns (L/R, U/D)   Tandem walking 4 laps 10' 4 laps  4 laps 4 laps 4 laps 4 laps 4 laps 4 laps 2 laps 10' 8 laps 10'   Standing calf s' 10x10'' ea 10x10'' ea 10x10'' ea 10x10'' ea 10x10'' ea 10x10'' ea 10x10'' ea 10x10'' ea                                            Ther Ex             Balance assessment             Ankle IV PROM             HR/TR 2x10 standing 2x10 standing 2x10  2x10 2x10 2x10  2x10 2x10 2x10 standing 2x10 standing   Seated DF/PF  2x10 Black TB ea 2x10 Black TB 2x10 Black TB 2x10 Black TB 2x10 Black TB 2x01 Black TB 2x10 Black TB 2x10 Black TB ea nv   HEP          Gastroc and Plantar fascia towel stretch   Bike (Cardiovascular Endurance)         P!                                Ther Activity                                       Gait Training                                       Modalities

## 2024-08-22 ENCOUNTER — OFFICE VISIT (OUTPATIENT)
Dept: PHYSICAL THERAPY | Facility: CLINIC | Age: 69
End: 2024-08-22
Payer: MEDICARE

## 2024-08-22 DIAGNOSIS — M21.42 PES PLANUS OF LEFT FOOT: ICD-10-CM

## 2024-08-22 DIAGNOSIS — M76.822 POSTERIOR TIBIAL TENDON DYSFUNCTION (PTTD) OF LEFT LOWER EXTREMITY: ICD-10-CM

## 2024-08-22 DIAGNOSIS — M19.071 ARTHRITIS OF RIGHT ANKLE: Primary | ICD-10-CM

## 2024-08-22 PROCEDURE — 97110 THERAPEUTIC EXERCISES: CPT | Performed by: PHYSICAL THERAPIST

## 2024-08-22 PROCEDURE — 97112 NEUROMUSCULAR REEDUCATION: CPT | Performed by: PHYSICAL THERAPIST

## 2024-08-22 PROCEDURE — 97140 MANUAL THERAPY 1/> REGIONS: CPT | Performed by: PHYSICAL THERAPIST

## 2024-08-22 NOTE — PROGRESS NOTES
"Daily Note     Today's date: 2024  Patient name: Sandy Mcgovern  : 1955  MRN: [id number]  Referring provider: Bekah Isacas DO  Dx:   Encounter Diagnosis     ICD-10-CM    1. Arthritis of right ankle  M19.071       2. Posterior tibial tendon dysfunction (PTTD) of left lower extremity  M76.822       3. Pes planus of left foot  M21.42                      Subjective: Sandy explains that her walking continues to improve.      Objective: See treatment diary below      Assessment: Tolerated treatment well. Patient demonstrated fatigue post treatment and exhibited good technique with therapeutic exercises. Sandy responded well to all TE with improvements noted in passive ankle mobility and standing balance this session. Added in eyes closed narrow base on foam with fatigue upon completion.        Plan: Continue per plan of care.      Precautions: none      Manuals  718 7/25 8/15 8/22 5/16   Bilateral Ankle/foot PROM/mobs RN RN RN RN RN RN RN RN RN RN   Left Ankle/Foot                                       Neuro Re-Ed             Ankle DF/PF             Ankle Inv Iso             TA SLR             Bridges             Ankle balance board 10x a/p, side 10x a/p, side X10 a/p, side large [place] X10 a/p, side large [place] X10 a/p, side large [place] X10 a/p, side large [place] X10 a/p, side large [place] X10 a/p, side large [place] X10 a/p, side 10x ap, side   Semi tandem stance - EC R ant 2x20'' R ant 2x20'' R ant 2x20'' R ant 2x20''     15x3'' R ant 2x20\"   Wobble board standing 2x10 a/p, side 2x10 a/p, side 2x10 a/p, side 2x10 a/p, side 2x10 a/p, side 2x10 a/p, side 2x10 a/p, side 2x10, a/p, side 2x10 a/p, side 2x10 ap, side   Standing tandem stance             NBOS EO/EC 3x15'' w/ head turns (L/R, U/D) 3x15'' w/ head turns (L/R, U/D) 3x15'' w/ head turns (L/R, U/D) 3x15'' w/ head turns (L/R, U/D) 3x15'' w/ head turns (L/R, U/D) 3x15'' w/ head turns (L/R, U/D) 3x15'' w/ head turns (L/R, " U/D) 3x15'' w/ head turns (L/R, U/D) 3x15'' w/ head turns 3x15'' w/ head turns (L/R, U/D)   Tandem walking 4 laps 10' 4 laps  4 laps 4 laps 4 laps 4 laps 4 laps 4 laps 2 laps 10' 8 laps 10'   Standing calf s' 10x10'' ea 10x10'' ea 10x10'' ea 10x10'' ea 10x10'' ea 10x10'' ea 10x10'' ea 10x10'' ea 10x10'' ea    NBOS on foam         3x30'' EC                              Ther Ex             Balance assessment             Ankle IV PROM             HR/TR 2x10 standing 2x10 standing 2x10  2x10 2x10 2x10  2x10 2x10 2x10 standing 2x10 standing   Seated DF/PF  2x10 Black TB ea 2x10 Black TB 2x10 Black TB 2x10 Black TB 2x10 Black TB 2x01 Black TB 2x10 Black TB 2x10 Black TB ea nv   HEP          Gastroc and Plantar fascia towel stretch   Bike (Cardiovascular Endurance)         P!                                Ther Activity                                       Gait Training                                       Modalities

## 2024-08-29 ENCOUNTER — OFFICE VISIT (OUTPATIENT)
Dept: PHYSICAL THERAPY | Facility: CLINIC | Age: 69
End: 2024-08-29
Payer: MEDICARE

## 2024-08-29 DIAGNOSIS — M19.071 ARTHRITIS OF RIGHT ANKLE: Primary | ICD-10-CM

## 2024-08-29 DIAGNOSIS — M21.42 PES PLANUS OF LEFT FOOT: ICD-10-CM

## 2024-08-29 DIAGNOSIS — M76.822 POSTERIOR TIBIAL TENDON DYSFUNCTION (PTTD) OF LEFT LOWER EXTREMITY: ICD-10-CM

## 2024-08-29 PROCEDURE — 97112 NEUROMUSCULAR REEDUCATION: CPT | Performed by: PHYSICAL THERAPIST

## 2024-08-29 PROCEDURE — 97110 THERAPEUTIC EXERCISES: CPT | Performed by: PHYSICAL THERAPIST

## 2024-08-29 PROCEDURE — 97140 MANUAL THERAPY 1/> REGIONS: CPT | Performed by: PHYSICAL THERAPIST

## 2024-08-29 NOTE — PROGRESS NOTES
"Daily Note     Today's date: 2024  Patient name: Sandy Mcgovern  : 1955  MRN: [id number]  Referring provider: Bekah Isaacs DO  Dx:   Encounter Diagnosis     ICD-10-CM    1. Arthritis of right ankle  M19.071       2. Posterior tibial tendon dysfunction (PTTD) of left lower extremity  M76.822       3. Pes planus of left foot  M21.42                      Subjective: Sandy explains that her right knee is giving her trouble this morning, no specific BOWEN but she feels it is swollen today.       Objective: See treatment diary below      Assessment: Tolerated treatment well. Patient demonstrated fatigue post treatment and exhibited good technique with therapeutic exercises. Sandy responded well to all TE this session despite knee discomfort, omitted side rocking during balance board and improved balance with head turns on unstable surfaces.       Plan: Continue per plan of care.      Precautions: none      Manuals  718 7/25 8/15 8/22 8/29   Bilateral Ankle/foot PROM/mobs RN RN RN RN RN RN RN RN RN RN   Left Ankle/Foot                                       Neuro Re-Ed             Ankle DF/PF             Ankle Inv Iso             TA SLR             Bridges             Ankle balance board 10x a/p, side 10x a/p, side X10 a/p, side large [place] X10 a/p, side large [place] X10 a/p, side large [place] X10 a/p, side large [place] X10 a/p, side large [place] X10 a/p, side large [place] X10 a/p, side 10x ap, side   Semi tandem stance - EC R ant 2x20'' R ant 2x20'' R ant 2x20'' R ant 2x20''     15x3'' R ant 2x20\"   Wobble board standing 2x10 a/p, side 2x10 a/p, side 2x10 a/p, side 2x10 a/p, side 2x10 a/p, side 2x10 a/p, side 2x10 a/p, side 2x10, a/p, side 2x10 a/p, side 2x10 ap, side   Standing tandem stance             NBOS EO/EC 3x15'' w/ head turns (L/R, U/D) 3x15'' w/ head turns (L/R, U/D) 3x15'' w/ head turns (L/R, U/D) 3x15'' w/ head turns (L/R, U/D) 3x15'' w/ head turns (L/R, U/D) 3x15'' " w/ head turns (L/R, U/D) 3x15'' w/ head turns (L/R, U/D) 3x15'' w/ head turns (L/R, U/D) 3x15'' w/ head turns 3x15'' w/ head turns (L/R, U/D) on foam   Tandem walking 4 laps 10' 4 laps  4 laps 4 laps 4 laps 4 laps 4 laps 4 laps 2 laps 10' 8 laps 10'   Standing calf s' 10x10'' ea 10x10'' ea 10x10'' ea 10x10'' ea 10x10'' ea 10x10'' ea 10x10'' ea 10x10'' ea 10x10'' ea 10x10''   NBOS on foam         3x30'' EC 3x30'' EC                             Ther Ex             Balance assessment             Ankle IV PROM             HR/TR 2x10 standing 2x10 standing 2x10  2x10 2x10 2x10  2x10 2x10 2x10 standing 2x10 standing   Seated DF/PF  2x10 Black TB ea 2x10 Black TB 2x10 Black TB 2x10 Black TB 2x10 Black TB 2x01 Black TB 2x10 Black TB 2x10 Black TB ea 2x10 Black TB   HEP          Gastroc and Plantar fascia towel stretch   Bike (Cardiovascular Endurance)         P!                                Ther Activity                                       Gait Training                                       Modalities

## 2024-09-05 ENCOUNTER — OFFICE VISIT (OUTPATIENT)
Dept: PHYSICAL THERAPY | Facility: CLINIC | Age: 69
End: 2024-09-05
Payer: MEDICARE

## 2024-09-05 DIAGNOSIS — M19.071 ARTHRITIS OF RIGHT ANKLE: Primary | ICD-10-CM

## 2024-09-05 DIAGNOSIS — M76.822 POSTERIOR TIBIAL TENDON DYSFUNCTION (PTTD) OF LEFT LOWER EXTREMITY: ICD-10-CM

## 2024-09-05 DIAGNOSIS — M21.42 PES PLANUS OF LEFT FOOT: ICD-10-CM

## 2024-09-05 PROCEDURE — 97112 NEUROMUSCULAR REEDUCATION: CPT | Performed by: PHYSICAL THERAPIST

## 2024-09-05 PROCEDURE — 97140 MANUAL THERAPY 1/> REGIONS: CPT | Performed by: PHYSICAL THERAPIST

## 2024-09-05 PROCEDURE — 97110 THERAPEUTIC EXERCISES: CPT | Performed by: PHYSICAL THERAPIST

## 2024-09-05 NOTE — PROGRESS NOTES
"Daily Note     Today's date: 2024  Patient name: Sandy Mcgovern  : 1955  MRN: [id number]  Referring provider: Bekah Isaacs DO  Dx:   Encounter Diagnosis     ICD-10-CM    1. Arthritis of right ankle  M19.071       2. Posterior tibial tendon dysfunction (PTTD) of left lower extremity  M76.822       3. Pes planus of left foot  M21.42                      Subjective: Sandy explains that her right knee is feeling much better this week, things have been going the right direction.      Objective: See treatment diary below      Assessment: Tolerated treatment well. Patient demonstrated fatigue post treatment and exhibited good technique with therapeutic exercises. Sandy responded well to all TE this session without any reproduction of knee discomfort. Continued challenge with eyes closed balance on foam. Continue to progress stability and balance nv.      Plan: Continue per plan of care.      Precautions: none      Manuals  718 7/25 8/15 8/22 8/29   Bilateral Ankle/foot PROM/mobs RN RN RN RN RN RN RN RN RN RN   Left Ankle/Foot                                       Neuro Re-Ed             Ankle DF/PF             Ankle Inv Iso             TA SLR             Bridges             Ankle balance board 10x a/p, side 10x a/p, side X10 a/p, side large [place] X10 a/p, side large [place] X10 a/p, side large [place] X10 a/p, side large [place] X10 a/p, side large [place] X10 a/p, side large [place] X10 a/p, side 10x ap, side   Semi tandem stance - EC R ant 2x20'' R ant 2x20'' R ant 2x20'' R ant 2x20''     15x3'' R ant 2x20\"   Wobble board standing 2x10 a/p, side 2x10 a/p, side 2x10 a/p, side 2x10 a/p, side 2x10 a/p, side 2x10 a/p, side 2x10 a/p, side 2x10, a/p, side 2x10 a/p, side 2x10 ap, side   Standing tandem stance             NBOS EO/EC 3x15'' w/ head turns (L/R, U/D) 3x15'' w/ head turns (L/R, U/D) 3x15'' w/ head turns (L/R, U/D) 3x15'' w/ head turns (L/R, U/D) 3x15'' w/ head turns (L/R, U/D) " 3x15'' w/ head turns (L/R, U/D) 3x15'' w/ head turns (L/R, U/D) 3x15'' w/ head turns (L/R, U/D) 3x15'' w/ head turns 3x15'' w/ head turns (L/R, U/D) on foam   Tandem walking 4 laps 10' 4 laps  4 laps 4 laps 4 laps 4 laps 4 laps 4 laps 2 laps 10' 8 laps 10'   Standing calf s' 10x10'' ea 10x10'' ea 10x10'' ea 10x10'' ea 10x10'' ea 10x10'' ea 10x10'' ea 10x10'' ea 10x10'' ea 10x10''   NBOS on foam         3x30'' EC 3x30'' EC                             Ther Ex             Balance assessment             Ankle IV PROM             HR/TR 2x10 standing 2x10 standing 2x10  2x10 2x10 2x10  2x10 2x10 2x10 standing 2x10 standing   Seated DF/PF  2x10 Black TB ea 2x10 Black TB 2x10 Black TB 2x10 Black TB 2x10 Black TB 2x01 Black TB 2x10 Black TB 2x10 Black TB ea 2x10 Black TB   HEP          Gastroc and Plantar fascia towel stretch   Bike (Cardiovascular Endurance)         P!                                Ther Activity                                       Gait Training                                       Modalities

## 2024-09-10 ENCOUNTER — CONSULT (OUTPATIENT)
Dept: FAMILY MEDICINE CLINIC | Facility: CLINIC | Age: 69
End: 2024-09-10
Payer: MEDICARE

## 2024-09-10 VITALS
SYSTOLIC BLOOD PRESSURE: 118 MMHG | BODY MASS INDEX: 32.17 KG/M2 | TEMPERATURE: 99 F | HEIGHT: 62 IN | WEIGHT: 174.8 LBS | HEART RATE: 69 BPM | OXYGEN SATURATION: 98 % | DIASTOLIC BLOOD PRESSURE: 76 MMHG | RESPIRATION RATE: 16 BRPM

## 2024-09-10 DIAGNOSIS — H25.011 CORTICAL AGE-RELATED CATARACT OF RIGHT EYE: ICD-10-CM

## 2024-09-10 DIAGNOSIS — Z01.818 PREOP EXAMINATION: Primary | ICD-10-CM

## 2024-09-10 DIAGNOSIS — Z91.89 RISK OF EXPOSURE TO LYME DISEASE: Primary | ICD-10-CM

## 2024-09-10 PROCEDURE — 99214 OFFICE O/P EST MOD 30 MIN: CPT | Performed by: FAMILY MEDICINE

## 2024-09-10 PROCEDURE — G2211 COMPLEX E/M VISIT ADD ON: HCPCS | Performed by: FAMILY MEDICINE

## 2024-09-10 NOTE — PROGRESS NOTES
[facility] PRE-OPERATIVE EVALUATION  [facility] PHYSICIAN GROUP - [facility] FAMILY PRACTICE    NAME: Sandy Mcgovern  AGE: 68 y.o. SEX: female  : 1955     DATE: 9/10/2024    [facility] Pre-Operative Evaluation      Chief Complaint: Pre-operative Evaluation     Surgery: Cataract Removal   Anticipated Date of Surgery: 2024  Referring Provider: Dr. Barrera     History of Present Illness:     Sandy Mcgovern is a 68 y.o. female who presents to the office today for a preoperative consultation at the request of surgeon, Dr. Barrera, who plans on performing cataract removal on 2024. Planned anesthesia is local. Patient has a bleeding risk of: no recent abnormal bleeding. Patient does not have objections to receiving blood products if needed. Current anti-platelet/anti-coagulation medications that the patient is prescribed includes:  none .      Assessment of Chronic Conditions:   Stable     Assessment of Cardiac Risk:  Denies unstable or severe angina or MI in the last 6 weeks or history of stent placement in the last year   Denies decompensated heart failure (e.g. New onset heart failure, NYHA functional class IV heart failure, or worsening existing heart failure)  Denies significant arrhythmias such as high grade AV block, symptomatic ventricular arrhythmia, newly recognized ventricular tachycardia, supraventricular tachycardia with resting heart rate >100, or symptomatic bradycardia  Denies severe heart valve disease including aortic stenosis or symptomatic mitral stenosis     Exercise Capacity:  Able to walk 4 blocks without symptoms?: Yes  Able to walk 2 flights without symptoms?: Yes    Prior Anesthesia Reactions: No     Personal history of venous thromboembolic disease? No    History of steroid use for >2 weeks within last year? No         Review of Systems:     Review of Systems   Constitutional:  Negative for chills, fatigue and fever.   HENT:  Negative for congestion, postnasal  drip, rhinorrhea and sinus pressure.    Eyes:  Negative for photophobia and visual disturbance.   Respiratory:  Negative for cough and shortness of breath.    Cardiovascular:  Negative for chest pain, palpitations and leg swelling.   Gastrointestinal:  Negative for abdominal pain, constipation, diarrhea, nausea and vomiting.   Genitourinary:  Negative for difficulty urinating and dysuria.   Musculoskeletal:  Negative for arthralgias and myalgias.   Skin:  Negative for color change and rash.   Neurological:  Negative for dizziness, weakness, light-headedness and headaches.       Current Problem List:     Patient Active Problem List   Diagnosis    Allergic rhinitis    Diverticulosis    Internal hemorrhoids    Vaginal atrophy    Uterine procidentia    Vaginal pessary in situ    Obesity (BMI 30.0-34.9)    Palpitations    Acute vaginitis    Posterior tibial tendon dysfunction (PTTD) of left lower extremity    Pes planus of left foot    Arthritis of right ankle    Frequent urination    Other specified glaucoma    Preop examination    Cortical age-related cataract of right eye       Allergies:     Allergies   Allergen Reactions    Naproxen Anaphylaxis     Per pt throat swelled up and eyes swelling - could barely breathe    Amphotericin B Rash    Ampicillin Rash    Penicillins Rash       Current Medications:       Current Outpatient Medications:     brimonidine (ALPHAGAN P) 0.15 % ophthalmic solution, Administer 1 drop to the right eye 3 (three) times a day, Disp: , Rfl:     Calcium Carb-Cholecalciferol 600-800 MG-UNIT TABS, Take 1 tablet by mouth in the morning, Disp: , Rfl:     dorzolamide-timolol (COSOPT) 2-0.5 % ophthalmic solution, Administer 1 drop to the right eye every 12 (twelve) hours, Disp: , Rfl:     estradiol (ESTRACE) 0.1 mg/g vaginal cream, Insert 1 g into the vagina 2 (two) times a week Daily x 2 weeks, then 2-3 times weekly. Use lowest frequency necessary to alleviate symptoms, Disp: 42.5 g, Rfl: 1     "Ibuprofen-Acetaminophen (ADVIL DUAL ACTION PO), Take by mouth, Disp: , Rfl:     Oxyquinoline-Sod Lauryl Sulf (Trimo-Gu) 0.025-0.01 % GEL, Insert 2 g into the vagina 2 (two) times a week, Disp: 113.4 g, Rfl: 1    pravastatin (PRAVACHOL) 20 mg tablet, TAKE 1 TABLET BY MOUTH EVERY DAY, Disp: 90 tablet, Rfl: 3    fluticasone (FLONASE) 50 mcg/act nasal spray, SPRAY 2 SPRAYS INTO EACH NOSTRIL EVERY DAY (Patient not taking: Reported on 9/10/2024), Disp: 48 mL, Rfl: 1    ibuprofen (MOTRIN) 200 mg tablet, Take 400 mg by mouth in the morning (Patient not taking: Reported on 9/10/2024), Disp: , Rfl:     Past Medical History:       Past Medical History:   Diagnosis Date    Cough     12/6/23 Deals with dry cough pt reports feels its a stress reaction the cough\"    Glaucoma     both eyes    History of umbilical hernia     Obesity     Papanicolaou smear 01/27/2020    neg    Rectocele     Sinus congestion     12/6/23 Intermitent sinus issues per pt - takes antihistamine as needed    Uterine prolapse 2020        Past Surgical History:   Procedure Laterality Date    COLONOSCOPY      DILATION AND EVACUATION  1980    x7    EGD      EXAMINATION UNDER ANESTHESIA N/A 12/12/2023    Procedure: EXAM UNDER ANESTHESIA (EUA);  Surgeon: Jeremiah Peres MD;  Location:  MAIN OR;  Service: Gynecology    HERNIA REPAIR  2013    MAMMO (HISTORICAL) Bilateral 01/30/2020    H BI-RADS 2 Benign findings Scattered area fibroglandular density; 25% to 50% glandular breast tissue    NEUROPLASTY / TRANSPOSITION MEDIAN NERVE AT CARPAL TUNNEL  2012    DE DILATION & CURETTAGE DX&/THER NONOBSTETRIC N/A 12/12/2023    Procedure: DILATATION AND CURETTAGE (D&C);  Surgeon: Jereimah Peres MD;  Location: [facility] MAIN OR;  Service: Gynecology        Family History   Problem Relation Age of Onset    Breast cancer Mother 94    Stroke Father     Heart disease Father     Diabetes Father     Hypertension Maternal Grandmother     Hypertension Paternal Grandmother     Ovarian " cancer Neg Hx     Colon cancer Neg Hx     Uterine cancer Neg Hx     Anesthesia problems Neg Hx         Social History     Socioeconomic History    Marital status: /Civil Union     Spouse name: Not on file    Number of children: 1    Years of education: Not on file    Highest education level: Not on file   Occupational History    Not on file   Tobacco Use    Smoking status: Never     Passive exposure: Never    Smokeless tobacco: Never    Tobacco comments:     Never a smoker or any tobacco use per pt    Vaping Use    Vaping status: Never Used   Substance and Sexual Activity    Alcohol use: Not Currently     Comment: No current alcohol use within the last 6 mos per pt    Drug use: Never     Comment: Denies any drug use per pt    Sexual activity: Not Currently     Birth control/protection: Post-menopausal     Comment: Not active at this time per pt   Other Topics Concern    Not on file   Social History Narrative    Exercises occasionally     Social Determinants of Health     Financial Resource Strain: Low Risk  (11/18/2022)    Overall Financial Resource Strain (CARDIA)     Difficulty of Paying Living Expenses: Not hard at all   Food Insecurity: Not on file   Transportation Needs: No Transportation Needs (11/18/2022)    PRAPARE - Transportation     Lack of Transportation (Medical): No     Lack of Transportation (Non-Medical): No   Physical Activity: Not on file   Stress: Not on file   Social Connections: Unknown (11/5/2020)    Social Connection and Isolation Panel [NHANES]     Frequency of Communication with Friends and Family: Not on file     Frequency of Social Gatherings with Friends and Family: Not on file     Attends Latter day Services: Not on file     Active Member of Clubs or Organizations: Not on file     Attends Club or Organization Meetings: Not on file     Marital Status:    Intimate Partner Violence: Not At Risk (11/20/2023)    Humiliation, Afraid, Rape, and Kick questionnaire     Fear of  "Current or Ex-Partner: No     Emotionally Abused: No     Physically Abused: No     Sexually Abused: No   Housing Stability: Not on file        Physical Exam:     /76 (BP Location: Right arm, Patient Position: Sitting, Cuff Size: Standard)   Pulse 69   Temp 99 °F (37.2 °C) (Tympanic)   Resp 16   Ht 5' 2\" (1.575 m)   Wt 79.3 kg (174 lb 12.8 oz)   LMP  (LMP Unknown)   SpO2 98%   BMI 31.97 kg/m²     Physical Exam  Constitutional:       Appearance: She is well-developed.   HENT:      Head: Normocephalic and atraumatic.   Eyes:      Pupils: Pupils are equal, round, and reactive to light.   Cardiovascular:      Rate and Rhythm: Normal rate and regular rhythm.      Heart sounds: Normal heart sounds.   Pulmonary:      Effort: Pulmonary effort is normal. No respiratory distress.      Breath sounds: Normal breath sounds. No wheezing.   Abdominal:      General: Bowel sounds are normal. There is no distension.      Palpations: Abdomen is soft.      Tenderness: There is no abdominal tenderness.   Musculoskeletal:         General: No tenderness. Normal range of motion.      Cervical back: Normal range of motion and neck supple.   Skin:     General: Skin is warm and dry.   Neurological:      Mental Status: She is alert and oriented to person, place, and time.   Psychiatric:         Behavior: Behavior normal.          Data:     Pre-operative work-up    Laboratory Results: none required      EKG: none required        Assessment & Recommendations:     1. Preop examination        2. Cortical age-related cataract of right eye            Pre-Op Evaluation Assessment  68 y.o. female with planned surgery: Right Cataract Removal.    Known risk factors for perioperative complications: None.        Current medications which may produce withdrawal symptoms if withheld perioperatively: none.    Pre-Op Evaluation Plan  1. Further preoperative workup as follows:   - None; no further preoperative work-up is required    2. Medication " Management/Recommendations:   - None, continue medication regimen including morning of surgery, with sip of water    3. Prophylaxis for cardiac events with perioperative beta-blockers: not indicated.    4. Patient requires further consultation with: None    Clearance  Patient is CLEARED for surgery without any additional cardiac testing.     Bekah Isaacs DO  [address] [address]  Phone#  [phone]  Fax#  [phone]

## 2024-09-11 ENCOUNTER — OFFICE VISIT (OUTPATIENT)
Dept: OBGYN CLINIC | Facility: CLINIC | Age: 69
End: 2024-09-11
Payer: MEDICARE

## 2024-09-11 VITALS
SYSTOLIC BLOOD PRESSURE: 130 MMHG | BODY MASS INDEX: 31.65 KG/M2 | HEIGHT: 62 IN | WEIGHT: 172 LBS | DIASTOLIC BLOOD PRESSURE: 82 MMHG

## 2024-09-11 DIAGNOSIS — Z96.0 VAGINAL PESSARY IN SITU: Primary | ICD-10-CM

## 2024-09-11 DIAGNOSIS — N81.3 UTERINE PROCIDENTIA: ICD-10-CM

## 2024-09-11 DIAGNOSIS — N95.2 VAGINAL ATROPHY: ICD-10-CM

## 2024-09-11 LAB — B BURGDOR IGG+IGM SER QL IA: NEGATIVE

## 2024-09-11 PROCEDURE — 99213 OFFICE O/P EST LOW 20 MIN: CPT | Performed by: STUDENT IN AN ORGANIZED HEALTH CARE EDUCATION/TRAINING PROGRAM

## 2024-09-11 RX ORDER — ESTRADIOL 0.1 MG/G
1 CREAM VAGINAL 2 TIMES WEEKLY
Qty: 42.5 G | Refills: 1 | Status: SHIPPED | OUTPATIENT
Start: 2024-09-12

## 2024-09-11 RX ORDER — OXYQUINOLINE SULFATE AND SODIUM LAURYL SULFATE .25; .1 MG/G; MG/G
2 JELLY VAGINAL 2 TIMES WEEKLY
Qty: 113.4 G | Refills: 1 | Status: SHIPPED | OUTPATIENT
Start: 2024-09-12

## 2024-09-11 NOTE — PROGRESS NOTES
[facility] [address], [address], [place] [address]    Assessment/Plan:  1. Vaginal atrophy  Assessment & Plan:  - Continue estrace cream twice weekly. Rx refilled today.   Orders:  -     estradiol (ESTRACE) 0.1 mg/g vaginal cream; Insert 1 g into the vagina 2 (two) times a week Daily x 2 weeks, then 2-3 times weekly. Use lowest frequency necessary to alleviate symptoms  2. Vaginal pessary in situ  Assessment & Plan:  - Continue Trimo-Gu twice weekly. Rx refilled today.  - Pessary removed, cleaned, and replaced.   - Return to office for routine pessary maintenance and comprehensive annual gynecologic visit in 3 months.   Orders:  -     Oxyquinoline-Sod Lauryl Sulf (Trimo-Gu) 0.025-0.01 % GEL; Insert 2 g into the vagina 2 (two) times a week  3. Uterine procidentia    Pessary    Date/Time: 2024 11:45 AM    Performed by: Jeremiah Peres MD  Authorized by: Jeremiah Peres MD  Universal Protocol:  procedure performed by consultantConsent: Verbal consent obtained.  Patient understanding: patient states understanding of the procedure being performed  Required items: required blood products, implants, devices, and special equipment available  Patient identity confirmed: verbally with patient    Indication:     Indication for pessary: uterine prolapse, cystocele and rectocele    Pre-procedure:     Pessary procedure type:  Cleaning/check  Problems:     Pessary complications: none    Procedure:     Pessary type:  Ring w/ support    Pessary size:  7    Patient tolerance of procedure:  Tolerated well, no immediate complications        Subjective:   Sandy Mcgovern is a 68 y.o.  presenting for pessary maintenance.   CC:   Chief Complaint   Patient presents with    Gynecology Problem     Pessary check- no longer bleeding       HPI: Patient presents for routine pessary check. She denies any bleeding, discharge, or odor.     ROS: Negative except as noted in HPI    No LMP recorded (lmp unknown).  "Patient is postmenopausal.       She  reports that she is not currently sexually active. She reports using the following method of birth control/protection: Post-menopausal.       The following portions of the patient's history were reviewed and updated as appropriate:   Past Medical History:   Diagnosis Date    Cough     12/6/23 Deals with dry cough pt reports feels its a stress reaction the cough\"    Glaucoma     both eyes    History of umbilical hernia     Obesity     Papanicolaou smear 01/27/2020    neg    Rectocele     Sinus congestion     12/6/23 Intermitent sinus issues per pt - takes antihistamine as needed    Uterine prolapse 2020     Past Surgical History:   Procedure Laterality Date    COLONOSCOPY      DILATION AND EVACUATION  1980    x7    EGD      EXAMINATION UNDER ANESTHESIA N/A 12/12/2023    Procedure: EXAM UNDER ANESTHESIA (EUA);  Surgeon: Jeremiah Peres MD;  Location: [facility] MAIN OR;  Service: Gynecology    HERNIA REPAIR  2013    MAMMO (HISTORICAL) Bilateral 01/30/2020    [facility] BI-RADS 2 Benign findings Scattered area fibroglandular density; 25% to 50% glandular breast tissue    NEUROPLASTY / TRANSPOSITION MEDIAN NERVE AT CARPAL TUNNEL  2012    [place] DILATION & CURETTAGE DX&/THER NONOBSTETRIC N/A 12/12/2023    Procedure: DILATATION AND CURETTAGE (D&C);  Surgeon: Jeremiah Peres MD;  Location: [facility] MAIN OR;  Service: Gynecology     Family History   Problem Relation Age of Onset    Breast cancer Mother 94    Stroke Father     Heart disease Father     Diabetes Father     Hypertension Maternal Grandmother     Hypertension Paternal Grandmother     Ovarian cancer Neg Hx     Colon cancer Neg Hx     Uterine cancer Neg Hx     Anesthesia problems Neg Hx      Social History     Socioeconomic History    Marital status: /Civil Union     Spouse name: Not on file    Number of children: 1    Years of education: Not on file    Highest education level: Not on file   Occupational History    Not on file   Tobacco Use    " Smoking status: Never     Passive exposure: Never    Smokeless tobacco: Never    Tobacco comments:     Never a smoker or any tobacco use per pt    Vaping Use    Vaping status: Never Used   Substance and Sexual Activity    Alcohol use: Not Currently     Comment: No current alcohol use within the last 6 mos per pt    Drug use: Never     Comment: Denies any drug use per pt    Sexual activity: Not Currently     Birth control/protection: Post-menopausal     Comment: Not active at this time per pt   Other Topics Concern    Not on file   Social History Narrative    Exercises occasionally     Social Determinants of Health     Financial Resource Strain: Low Risk  (11/18/2022)    Overall Financial Resource Strain (CARDIA)     Difficulty of Paying Living Expenses: Not hard at all   Food Insecurity: Not on file   Transportation Needs: No Transportation Needs (11/18/2022)    PRAPARE - Transportation     Lack of Transportation (Medical): No     Lack of Transportation (Non-Medical): No   Physical Activity: Not on file   Stress: Not on file   Social Connections: Unknown (11/5/2020)    Social Connection and Isolation Panel [NHANES]     Frequency of Communication with Friends and Family: Not on file     Frequency of Social Gatherings with Friends and Family: Not on file     Attends Jewish Services: Not on file     Active Member of Clubs or Organizations: Not on file     Attends Club or Organization Meetings: Not on file     Marital Status:    Intimate Partner Violence: Not At Risk (11/20/2023)    Humiliation, Afraid, Rape, and Kick questionnaire     Fear of Current or Ex-Partner: No     Emotionally Abused: No     Physically Abused: No     Sexually Abused: No   Housing Stability: Not on file     Outpatient Medications Marked as Taking for the 9/11/24 encounter (Office Visit) with Jeremiah Peres MD   Medication    brimonidine (ALPHAGAN P) 0.15 % ophthalmic solution    Calcium Carb-Cholecalciferol 600-800 MG-UNIT TABS     "dorzolamide-timolol (COSOPT) 2-0.5 % ophthalmic solution    [START ON 9/12/2024] estradiol (ESTRACE) 0.1 mg/g vaginal cream    Ibuprofen-Acetaminophen (ADVIL DUAL ACTION PO)    [START ON 9/12/2024] Oxyquinoline-Sod Lauryl Sulf (Trimo-Gu) 0.025-0.01 % GEL    pravastatin (PRAVACHOL) 20 mg tablet    [DISCONTINUED] estradiol (ESTRACE) 0.1 mg/g vaginal cream    [DISCONTINUED] Oxyquinoline-Sod Lauryl Sulf (Trimo-Gu) 0.025-0.01 % GEL     Allergies   Allergen Reactions    Naproxen Anaphylaxis     Per pt throat swelled up and eyes swelling - could barely breathe    Amphotericin B Rash    Ampicillin Rash    Penicillins Rash           Objective:  /82 (BP Location: Left arm, Patient Position: Sitting, Cuff Size: Standard)   Ht 5' 2\" (1.575 m)   Wt 78 kg (172 lb)   LMP  (LMP Unknown)   BMI 31.46 kg/m²        Chaperone present? Yes: Nesha Vázquez MA.    General Appearance: alert and oriented, in no acute distress.   Abdomen: Soft, non-tender, non-distended, no masses, no rebound or guarding.  Pelvic:       External genitalia: Normal appearance, no abnormal pigmentation, no lesions or masses. Normal Bartholin's and [place]'s.      Urinary system: Urethral meatus normal, bladder non-tender.      Vaginal: Complete procidentia. Atrophic mucosa. No erosions. Normal-appearing physiologic discharge. No bleeding noted.   Extremities: Normal range of motion.   Skin: normal, no rash or abnormalities  Neurologic: alert, oriented x3  Psychiatric: Appropriate affect, mood stable, cooperative with exam.        Jeremiah Peres MD  9/11/2024 12:10 PM  "

## 2024-09-11 NOTE — ASSESSMENT & PLAN NOTE
- Continue Trimo-Gu twice weekly. Rx refilled today.  - Pessary removed, cleaned, and replaced.   - Return to office for routine pessary maintenance and comprehensive annual gynecologic visit in 3 months.

## 2024-09-12 ENCOUNTER — OFFICE VISIT (OUTPATIENT)
Dept: PHYSICAL THERAPY | Facility: CLINIC | Age: 69
End: 2024-09-12
Payer: MEDICARE

## 2024-09-12 DIAGNOSIS — M21.42 PES PLANUS OF LEFT FOOT: ICD-10-CM

## 2024-09-12 DIAGNOSIS — M76.822 POSTERIOR TIBIAL TENDON DYSFUNCTION (PTTD) OF LEFT LOWER EXTREMITY: ICD-10-CM

## 2024-09-12 DIAGNOSIS — M19.071 ARTHRITIS OF RIGHT ANKLE: Primary | ICD-10-CM

## 2024-09-12 PROCEDURE — 97110 THERAPEUTIC EXERCISES: CPT | Performed by: PHYSICAL THERAPIST

## 2024-09-12 PROCEDURE — 97140 MANUAL THERAPY 1/> REGIONS: CPT | Performed by: PHYSICAL THERAPIST

## 2024-09-12 PROCEDURE — 97112 NEUROMUSCULAR REEDUCATION: CPT | Performed by: PHYSICAL THERAPIST

## 2024-09-12 NOTE — PROGRESS NOTES
"Daily Note     Today's date: 2024  Patient name: Sandy Mcgovern  : 1955  MRN: [id number]  Referring provider: Bekah Isaacs DO  Dx:   Encounter Diagnosis     ICD-10-CM    1. Arthritis of right ankle  M19.071       2. Posterior tibial tendon dysfunction (PTTD) of left lower extremity  M76.822       3. Pes planus of left foot  M21.42                      Subjective: Sandy explains that the oyster picnic went well, she is a little sore in her legs and ankles since she was on her feet for so much time.       Objective: See treatment diary below      Assessment: Tolerated treatment well. Patient demonstrated fatigue post treatment and exhibited good technique with therapeutic exercises. Sandy responded well to all TE this session without any increase in ankle pain. Continued challenge with eyes closed balance on foam but less sway noted during completion. Continue to progress stability and balance nv.      Plan: Continue per plan of care.      Precautions: none      Manuals  718 7/25 8/15 8/22 8/29   Bilateral Ankle/foot PROM/mobs RN RN RN RN RN RN RN RN RN RN   Left Ankle/Foot                                       Neuro Re-Ed             Ankle DF/PF             Ankle Inv Iso             TA SLR             Bridges             Ankle balance board 10x a/p, side 10x a/p, side X10 a/p, side large [place] X10 a/p, side large [place] X10 a/p, side large [place] X10 a/p, side large [place] X10 a/p, side large [place] X10 a/p, side large [place] X10 a/p, side 10x ap, side   Semi tandem stance - EC R ant 2x20'' R ant 2x20'' R ant 2x20'' R ant 2x20''     15x3'' R ant 2x20\"   Wobble board standing 2x10 a/p, side 2x10 a/p, side 2x10 a/p, side 2x10 a/p, side 2x10 a/p, side 2x10 a/p, side 2x10 a/p, side 2x10, a/p, side 2x10 a/p, side 2x10 ap, side   Standing tandem stance             NBOS EO/EC 3x15'' w/ head turns (L/R, U/D) 3x15'' w/ head turns (L/R, U/D) 3x15'' w/ head turns (L/R, U/D) 3x15'' w/ " head turns (L/R, U/D) 3x15'' w/ head turns (L/R, U/D) 3x15'' w/ head turns (L/R, U/D) 3x15'' w/ head turns (L/R, U/D) 3x15'' w/ head turns (L/R, U/D) 3x15'' w/ head turns 3x15'' w/ head turns (L/R, U/D) on foam   Tandem walking 4 laps 10' 4 laps  4 laps 4 laps 4 laps 4 laps 4 laps 4 laps 2 laps 10' 8 laps 10'   Standing calf s' 10x10'' ea 10x10'' ea 10x10'' ea 10x10'' ea 10x10'' ea 10x10'' ea 10x10'' ea 10x10'' ea 10x10'' ea 10x10''   NBOS on foam         3x30'' EC 3x30'' EC                             Ther Ex             Balance assessment             Ankle IV PROM             HR/TR 2x10 standing 2x10 standing 2x10  2x10 2x10 2x10  2x10 2x10 2x10 standing 2x10 standing   Seated DF/PF  2x10 Black TB ea 2x10 Black TB 2x10 Black TB 2x10 Black TB 2x10 Black TB 2x01 Black TB 2x10 Black TB 2x10 Black TB ea 2x10 Black TB   HEP          Gastroc and Plantar fascia towel stretch   Bike (Cardiovascular Endurance)         P!                                Ther Activity                                       Gait Training                                       Modalities

## 2024-09-13 ENCOUNTER — TELEPHONE (OUTPATIENT)
Age: 69
End: 2024-09-13

## 2024-09-13 NOTE — TELEPHONE ENCOUNTER
Patient would like to know if she should stop taking her Advil/Tylenol dual action now or if she can continue with it.   Her surgery is scheduled for 9/18.     Please call to discuss. Thank you!

## 2024-09-13 NOTE — TELEPHONE ENCOUNTER
Spoke to patient and Dr. Marshall said to stop one week prior to surgery ,even Tylenol ,but to also verify with the surgeon (patient agrees)

## 2024-09-26 ENCOUNTER — APPOINTMENT (OUTPATIENT)
Dept: PHYSICAL THERAPY | Facility: CLINIC | Age: 69
End: 2024-09-26
Payer: MEDICARE

## 2024-10-02 ENCOUNTER — CONSULT (OUTPATIENT)
Dept: FAMILY MEDICINE CLINIC | Facility: CLINIC | Age: 69
End: 2024-10-02
Payer: MEDICARE

## 2024-10-02 VITALS
RESPIRATION RATE: 16 BRPM | OXYGEN SATURATION: 99 % | BODY MASS INDEX: 31.94 KG/M2 | WEIGHT: 173.6 LBS | HEART RATE: 71 BPM | DIASTOLIC BLOOD PRESSURE: 80 MMHG | SYSTOLIC BLOOD PRESSURE: 120 MMHG | HEIGHT: 62 IN | TEMPERATURE: 97.7 F

## 2024-10-02 DIAGNOSIS — H25.012 CORTICAL AGE-RELATED CATARACT OF LEFT EYE: ICD-10-CM

## 2024-10-02 DIAGNOSIS — Z01.818 PREOP EXAMINATION: Primary | ICD-10-CM

## 2024-10-02 PROCEDURE — 99213 OFFICE O/P EST LOW 20 MIN: CPT

## 2024-10-02 PROCEDURE — G2211 COMPLEX E/M VISIT ADD ON: HCPCS

## 2024-10-02 NOTE — PROGRESS NOTES
Pre-operative Clearance  Name: Sandy Mcgovern      : 1955      MRN: [id number]  Encounter Provider: BRYON Matthews  Encounter Date: 10/2/2024   Encounter department: [facility]    Assessment & Plan  Preop examination         Cortical age-related cataract of left eye  [facility] form filled out and faxed to center.       Pre-operative Clearance:     Revised Cardiac Risk Index:  RCI RISK CLASS I (0 risk factors, risk of major cardiac complications approximately 0.5%)    Clearance:  Patient is medically optimized (CLEARED) for proposed surgery without any additional cardiac testing.      Medication Instructions:   - Avoid herbs or non-directed vitamins one week prior to surgery    - Avoid aspirin containing medications or non-steroidal anti-inflammatory drugs one week preceding surgery    - May take tylenol for pain up until the night before surgery    - Hormone replacement therapy: Continue to take this medication on your normal schedule. This medication may need to be discontinued for at least 4 weeks prior to surgery if the surgical procedure is associated with high risk of blood clots. Consult with your surgeon.  - Hyperlipidemia meds: Continue to take this medication on your normal schedule.      Depression Screening and Follow-up Plan: Patient was screened for depression during today's encounter. They screened negative with a PHQ-2 score of 0.      History of Present Illness     Sandy Mcgovern is a 68 y.o. year old female who presents today preoperative clearance for cataract surgery of the left eye.      Pre-op Exam  Surgery: CAT/IOL OS  Anticipated Date of Surgery: 10/15/2024  Surgeon: Dr. Cisco Barrera    Had right eye cataract surgery 1 month ago. Now going to have left eye done.    Previous history of bleeding disorders or clots?: No  Previous Anesthesia reaction?: No  Prolonged steroid use in the last 6 months?: No    Assessment of Cardiac  Risk:   - Unstable or severe angina or MI in the last 6 weeks or history of stent placement in the last year?: No   - Decompensated heart failure (e.g. New onset heart failure, NYHA  Class IV heart failure, or worsening existing heart failure)?: No  - Significant arrhythmias such as high grade AV block, symptomatic ventricular arrhythmia, newly recognized ventricular tachycardia, supraventricular tachycardia with resting heart rate >100, or symptomatic bradycardia?: No  - Severe heart valve disease including aortic stenosis or symptomatic mitral stenosis?: No      Pre-operative Risk Factors:  Elevated-risk surgery: No    History of cerebrovascular disease: No    History of ischemic heart disease: No  Pre-operative treatment with insulin: No  Pre-operative creatinine >2 mg/dL: No    History of congestive heart failure: No    Duke Activity Status Index (DASI):   DASI Total Score: 42.2  METs: 7.9    Medications of Perioperative Concern:   NSAIDs    Advised avoidance for 1 week prior    Review of Systems   Constitutional: Negative.  Negative for chills, fatigue and fever.   HENT: Negative.  Negative for congestion, ear pain, rhinorrhea and sore throat.    Eyes:  Positive for visual disturbance (left eye cataract). Negative for pain.   Respiratory: Negative.  Negative for cough and shortness of breath.    Cardiovascular: Negative.  Negative for chest pain, palpitations and leg swelling.   Gastrointestinal:  Negative for abdominal pain, constipation, diarrhea, nausea and vomiting.   Endocrine: Negative.    Genitourinary: Negative.  Negative for dysuria, frequency and urgency.   Musculoskeletal: Negative.  Negative for back pain and myalgias.   Skin: Negative.  Negative for rash.   Allergic/Immunologic: Negative.    Neurological: Negative.  Negative for dizziness, weakness, light-headedness and headaches.   Hematological: Negative.    Psychiatric/Behavioral: Negative.       Past Medical History   Past Medical History:  "  Diagnosis Date    Cough     12/6/23 Deals with dry cough pt reports feels its a stress reaction the cough\"    Glaucoma     both eyes    History of umbilical hernia     Obesity     Papanicolaou smear 01/27/2020    neg    Rectocele     Sinus congestion     12/6/23 Intermitent sinus issues per pt - takes antihistamine as needed    Uterine prolapse 2020     Past Surgical History:   Procedure Laterality Date    COLONOSCOPY      DILATION AND EVACUATION  1980    x7    EGD      EXAMINATION UNDER ANESTHESIA N/A 12/12/2023    Procedure: EXAM UNDER ANESTHESIA (EUA);  Surgeon: Jeremiah Peres MD;  Location:  MAIN OR;  Service: Gynecology    HERNIA REPAIR  2013    MAMMO (HISTORICAL) Bilateral 01/30/2020    [facility] BI-RADS 2 Benign findings Scattered area fibroglandular density; 25% to 50% glandular breast tissue    NEUROPLASTY / TRANSPOSITION MEDIAN NERVE AT CARPAL TUNNEL  2012    VA DILATION & CURETTAGE DX&/THER NONOBSTETRIC N/A 12/12/2023    Procedure: DILATATION AND CURETTAGE (D&C);  Surgeon: Jeremiah Peres MD;  Location:  MAIN OR;  Service: Gynecology     Family History   Problem Relation Age of Onset    Breast cancer Mother 94    Stroke Father     Heart disease Father     Diabetes Father     Hypertension Maternal Grandmother     Hypertension Paternal Grandmother     Ovarian cancer Neg Hx     Colon cancer Neg Hx     Uterine cancer Neg Hx     Anesthesia problems Neg Hx      Social History     Tobacco Use    Smoking status: Never     Passive exposure: Never    Smokeless tobacco: Never    Tobacco comments:     Never a smoker or any tobacco use per pt    Vaping Use    Vaping status: Never Used   Substance and Sexual Activity    Alcohol use: Not Currently    Drug use: Never     Comment: Denies any drug use per pt    Sexual activity: Not Currently     Birth control/protection: Post-menopausal     Comment: Not active at this time per pt     Current Outpatient Medications on File Prior to Visit   Medication Sig    brimonidine " "(ALPHAGAN P) 0.15 % ophthalmic solution Administer 1 drop to the right eye 3 (three) times a day    Calcium Carb-Cholecalciferol 600-800 MG-UNIT TABS Take 1 tablet by mouth in the morning    dorzolamide-timolol (COSOPT) 2-0.5 % ophthalmic solution Administer 1 drop to the right eye every 12 (twelve) hours    estradiol (ESTRACE) 0.1 mg/g vaginal cream Insert 1 g into the vagina 2 (two) times a week Daily x 2 weeks, then 2-3 times weekly. Use lowest frequency necessary to alleviate symptoms    Ibuprofen-Acetaminophen (ADVIL DUAL ACTION PO) Take by mouth    Oxyquinoline-Sod Lauryl Sulf (Trimo-Gu) 0.025-0.01 % GEL Insert 2 g into the vagina 2 (two) times a week    pravastatin (PRAVACHOL) 20 mg tablet TAKE 1 TABLET BY MOUTH EVERY DAY    fluticasone (FLONASE) 50 mcg/act nasal spray SPRAY 2 SPRAYS INTO EACH NOSTRIL EVERY DAY (Patient not taking: Reported on 9/10/2024)    ibuprofen (MOTRIN) 200 mg tablet Take 400 mg by mouth in the morning (Patient not taking: Reported on 9/10/2024)     Allergies   Allergen Reactions    Naproxen Anaphylaxis     Per pt throat swelled up and eyes swelling - could barely breathe    Amphotericin B Rash    Ampicillin Rash    Penicillins Rash     Objective     /80 (BP Location: Left arm, Patient Position: Sitting, Cuff Size: Large)   Pulse 71   Temp 97.7 °F (36.5 °C) (Tympanic)   Resp 16   Ht 5' 2\" (1.575 m)   Wt 78.7 kg (173 lb 9.6 oz)   LMP  (LMP Unknown)   SpO2 99%   BMI 31.75 kg/m²     Physical Exam  Vitals and nursing note reviewed.   Constitutional:       General: She is not in acute distress.     Appearance: Normal appearance. She is not ill-appearing.   HENT:      Head: Normocephalic and atraumatic.      Right Ear: Tympanic membrane, ear canal and external ear normal.      Left Ear: Tympanic membrane, ear canal and external ear normal.      Nose: Nose normal. No congestion.      Mouth/Throat:      Mouth: Mucous membranes are moist.      Pharynx: Oropharynx is clear. No " posterior oropharyngeal erythema.   Eyes:      Extraocular Movements: Extraocular movements intact.      Conjunctiva/sclera: Conjunctivae normal.      Pupils: Pupils are equal, round, and reactive to light.   Cardiovascular:      Rate and Rhythm: Normal rate and regular rhythm.      Pulses: Normal pulses.      Heart sounds: Normal heart sounds. No murmur heard.  Pulmonary:      Effort: Pulmonary effort is normal. No respiratory distress.      Breath sounds: Normal breath sounds. No wheezing.   Abdominal:      General: Abdomen is flat. Bowel sounds are normal.      Palpations: Abdomen is soft.      Tenderness: There is no abdominal tenderness.   Musculoskeletal:         General: No tenderness. Normal range of motion.      Cervical back: Normal range of motion and neck supple. No tenderness.      Right lower leg: No edema.      Left lower leg: No edema.   Lymphadenopathy:      Cervical: No cervical adenopathy.   Skin:     General: Skin is warm and dry.      Capillary Refill: Capillary refill takes less than 2 seconds.      Findings: No bruising or rash.   Neurological:      General: No focal deficit present.      Mental Status: She is alert and oriented to person, place, and time.   Psychiatric:         Mood and Affect: Mood normal.         Behavior: Behavior normal.           BRYON Matthews

## 2024-10-02 NOTE — PATIENT INSTRUCTIONS
Pre-operative Medication Instructions    Avoid herbs or non-directed vitamins one week prior to surgery  Avoid aspirin containing medications or non-steroidal anti-inflammatory drugs one week preceding surgery  May take tylenol for pain up until the night before surgery    Hormone Replacement Therapy     Medication Name     estradiol (ESTRACE) 0.1 mg/g vaginal cream      Continue to take this medication on your normal schedule.  If this is an oral medication and you take it in the morning, then you may take this medicine with a sip of water.  This medication may need to be discontinued for a least 4 weeks prior to your surgery if the surgical procedure is associated with a high risk of blod clots as in hip or knee replacement for example.  Please consult with your Surgeon to discuss discontinuing this medication before your surgery.    Cholesterol lowering meds     Medication Name     pravastatin (PRAVACHOL) 20 mg tablet      Continue to take this medication on your normal schedule.  If this is an oral medication and you take it in the morning, then you may take this medicine with a sip of water.

## 2024-10-10 ENCOUNTER — OFFICE VISIT (OUTPATIENT)
Dept: PHYSICAL THERAPY | Facility: CLINIC | Age: 69
End: 2024-10-10
Payer: MEDICARE

## 2024-10-10 DIAGNOSIS — M19.071 ARTHRITIS OF RIGHT ANKLE: Primary | ICD-10-CM

## 2024-10-10 DIAGNOSIS — M21.42 PES PLANUS OF LEFT FOOT: ICD-10-CM

## 2024-10-10 DIAGNOSIS — M76.822 POSTERIOR TIBIAL TENDON DYSFUNCTION (PTTD) OF LEFT LOWER EXTREMITY: ICD-10-CM

## 2024-10-10 PROCEDURE — 97112 NEUROMUSCULAR REEDUCATION: CPT | Performed by: PHYSICAL THERAPIST

## 2024-10-10 PROCEDURE — 97140 MANUAL THERAPY 1/> REGIONS: CPT | Performed by: PHYSICAL THERAPIST

## 2024-10-10 PROCEDURE — 97110 THERAPEUTIC EXERCISES: CPT | Performed by: PHYSICAL THERAPIST

## 2024-10-10 NOTE — PROGRESS NOTES
"Daily Note     Today's date: 10/10/2024  Patient name: Sandy Mcgovern  : 1955  MRN: [id number]  Referring provider: Bekah Isaacs DO  Dx:   Encounter Diagnosis     ICD-10-CM    1. Arthritis of right ankle  M19.071       2. Posterior tibial tendon dysfunction (PTTD) of left lower extremity  M76.822       3. Pes planus of left foot  M21.42                      Subjective: Sandy explains that her ankles have been feeling well, her walking continues to feel good.       Objective: See treatment diary below      Assessment: Tolerated treatment well. Patient demonstrated fatigue post treatment and exhibited good technique with therapeutic exercises. Sandy responded well to all manuals and mobs to midfoot, fatigue upon completion of balance with cueing to correct positioning and posture during completion.       Plan: Continue per plan of care.      Precautions: none      Manuals 9/5 9/12 10/10      8/22 8/29   Bilateral Ankle/foot PROM/mobs RN RN RN      RN RN   Left Ankle/Foot                                       Neuro Re-Ed             Ankle DF/PF             Ankle Inv Iso             TA SLR             Bridges             Ankle balance board 10x a/p, side 10x a/p, side X10 a/p, side large [place]      X10 a/p, side 10x ap, side   Semi tandem stance - EC R ant 2x20'' R ant 2x20'' R ant 2x20''      15x3'' R ant 2x20\"   Wobble board standing 2x10 a/p, side 2x10 a/p, side 2x10 a/p, side      2x10 a/p, side 2x10 ap, side   Standing tandem stance             NBOS EO/EC 3x15'' w/ head turns (L/R, U/D) 3x15'' w/ head turns (L/R, U/D) 3x15'' w/ head turns (L/R, U/D)      3x15'' w/ head turns 3x15'' w/ head turns (L/R, U/D) on foam   Tandem walking 4 laps 10' 4 laps  4 laps      2 laps 10' 8 laps 10'   Standing calf s' 10x10'' ea 10x10'' ea 10x10'' ea      10x10'' ea 10x10''   NBOS on foam         3x30'' EC 3x30'' EC                             Ther Ex             Balance assessment             Ankle IV PROM         "     HR/TR 2x10 standing 2x10 standing 2x10       2x10 standing 2x10 standing   Seated DF/PF  2x10 Black TB ea 2x10 Black TB      2x10 Black TB ea 2x10 Black TB   HEP          Gastroc and Plantar fascia towel stretch   Bike (Cardiovascular Endurance)                                         Ther Activity                                       Gait Training                                       Modalities

## 2024-10-31 ENCOUNTER — OFFICE VISIT (OUTPATIENT)
Dept: PHYSICAL THERAPY | Facility: CLINIC | Age: 69
End: 2024-10-31
Payer: MEDICARE

## 2024-10-31 DIAGNOSIS — M21.42 PES PLANUS OF LEFT FOOT: ICD-10-CM

## 2024-10-31 DIAGNOSIS — M76.822 POSTERIOR TIBIAL TENDON DYSFUNCTION (PTTD) OF LEFT LOWER EXTREMITY: ICD-10-CM

## 2024-10-31 DIAGNOSIS — M19.071 ARTHRITIS OF RIGHT ANKLE: Primary | ICD-10-CM

## 2024-10-31 PROCEDURE — 97110 THERAPEUTIC EXERCISES: CPT | Performed by: PHYSICAL THERAPIST

## 2024-10-31 PROCEDURE — 97140 MANUAL THERAPY 1/> REGIONS: CPT | Performed by: PHYSICAL THERAPIST

## 2024-10-31 PROCEDURE — 97112 NEUROMUSCULAR REEDUCATION: CPT | Performed by: PHYSICAL THERAPIST

## 2024-10-31 NOTE — PROGRESS NOTES
"Daily Note     Today's date: 10/31/2024  Patient name: Sandy Mcgovern  : 1955  MRN: [id number]  Referring provider: Bekah Isaacs DO  Dx:   Encounter Diagnosis     ICD-10-CM    1. Arthritis of right ankle  M19.071       2. Posterior tibial tendon dysfunction (PTTD) of left lower extremity  M76.822       3. Pes planus of left foot  M21.42                      Subjective: Sandy explains that her eye procedure went well and she is ready to get back into her routine.       Objective: See treatment diary below      Assessment: Tolerated treatment well. Patient demonstrated fatigue post treatment and exhibited good technique with therapeutic exercises. Sandy responded well to all TE, increased challenge and fatigue due to hiatus in PT. Avoided head turns during balance due to recent surgery.      Plan: Continue per plan of care.      Precautions: none      Manuals 9/5 9/12 10/10 10/31     8/22 8/29   Bilateral Ankle/foot PROM/mobs RN RN RN RN     RN RN   Left Ankle/Foot                                       Neuro Re-Ed             Ankle DF/PF             Ankle Inv Iso             TA SLR             Bridges             Ankle balance board 10x a/p, side 10x a/p, side X10 a/p, side large [place] X10 a/p, side large [place]     X10 a/p, side 10x ap, side   Semi tandem stance - EC R ant 2x20'' R ant 2x20'' R ant 2x20'' R ant 2x20''     15x3'' R ant 2x20\"   Wobble board standing 2x10 a/p, side 2x10 a/p, side 2x10 a/p, side 2x10 a/p, side     2x10 a/p, side 2x10 ap, side   Standing tandem stance             NBOS EO/EC 3x15'' w/ head turns (L/R, U/D) 3x15'' w/ head turns (L/R, U/D) 3x15'' w/ head turns (L/R, U/D) 3x15'' w/ head turns (L/R, U/D)     3x15'' w/ head turns 3x15'' w/ head turns (L/R, U/D) on foam   Tandem walking 4 laps 10' 4 laps  4 laps 4 laps     2 laps 10' 8 laps 10'   Standing calf s' 10x10'' ea 10x10'' ea 10x10'' ea 10x10'' ea     10x10'' ea 10x10''   NBOS on foam         3x30'' EC 3x30'' EC         "                     Ther Ex             Balance assessment             Ankle IV PROM             HR/TR 2x10 standing 2x10 standing 2x10  2x10     2x10 standing 2x10 standing   Seated DF/PF  2x10 Black TB ea 2x10 Black TB 2x10 Black TB     2x10 Black TB ea 2x10 Black TB   HEP          Gastroc and Plantar fascia towel stretch   Bike (Cardiovascular Endurance)                                         Ther Activity                                       Gait Training                                       Modalities

## 2024-11-07 ENCOUNTER — APPOINTMENT (OUTPATIENT)
Dept: PHYSICAL THERAPY | Facility: CLINIC | Age: 69
End: 2024-11-07
Payer: MEDICARE

## 2024-11-14 ENCOUNTER — OFFICE VISIT (OUTPATIENT)
Dept: PHYSICAL THERAPY | Facility: CLINIC | Age: 69
End: 2024-11-14
Payer: MEDICARE

## 2024-11-14 DIAGNOSIS — M76.822 POSTERIOR TIBIAL TENDON DYSFUNCTION (PTTD) OF LEFT LOWER EXTREMITY: ICD-10-CM

## 2024-11-14 DIAGNOSIS — M21.42 PES PLANUS OF LEFT FOOT: ICD-10-CM

## 2024-11-14 DIAGNOSIS — M19.071 ARTHRITIS OF RIGHT ANKLE: Primary | ICD-10-CM

## 2024-11-14 PROCEDURE — 97110 THERAPEUTIC EXERCISES: CPT | Performed by: PHYSICAL THERAPIST

## 2024-11-14 PROCEDURE — 97112 NEUROMUSCULAR REEDUCATION: CPT | Performed by: PHYSICAL THERAPIST

## 2024-11-14 PROCEDURE — 97140 MANUAL THERAPY 1/> REGIONS: CPT | Performed by: PHYSICAL THERAPIST

## 2024-11-14 NOTE — PROGRESS NOTES
"Daily Note     Today's date: 2024  Patient name: Sandy Mcgovern  : 1955  MRN: [id number]  Referring provider: Bekah Isaacs DO  Dx:   Encounter Diagnosis     ICD-10-CM    1. Arthritis of right ankle  M19.071       2. Posterior tibial tendon dysfunction (PTTD) of left lower extremity  M76.822       3. Pes planus of left foot  M21.42                      Subjective: Sandy explains that her ankles have been a bit sore, particularly after remaining on concrete surfaces for extended periods.       Objective: See treatment diary below      Assessment: Tolerated treatment well. Patient demonstrated fatigue post treatment. Sandy explains that her ankle feels more mobile and a bit more relaxed after manuals. Fatigue upon completion of session will continue to progress balance, strength and stability nv.       Plan: Continue per plan of care.      Precautions: none      Manuals 9/5 9/12 10/10 10/31 11/14    8/22 8/29   Bilateral Ankle/foot PROM/mobs RN RN RN RN RN    RN RN   Left Ankle/Foot                                       Neuro Re-Ed             Ankle DF/PF             Ankle Inv Iso             TA SLR             Bridges             Ankle balance board 10x a/p, side 10x a/p, side X10 a/p, side large [place] X10 a/p, side large [place] X10 a/p, side large [place]    X10 a/p, side 10x ap, side   Semi tandem stance - EC R ant 2x20'' R ant 2x20'' R ant 2x20'' R ant 2x20'' R any 2x20''    15x3'' R ant 2x20\"   Wobble board standing 2x10 a/p, side 2x10 a/p, side 2x10 a/p, side 2x10 a/p, side 2x10 a/p, side    2x10 a/p, side 2x10 ap, side   Standing tandem stance             NBOS EO/EC 3x15'' w/ head turns (L/R, U/D) 3x15'' w/ head turns (L/R, U/D) 3x15'' w/ head turns (L/R, U/D) 3x15'' w/ head turns (L/R, U/D) 3x15'' w/ head turns (L/R, U/D)    3x15'' w/ head turns 3x15'' w/ head turns (L/R, U/D) on foam   Tandem walking 4 laps 10' 4 laps  4 laps 4 laps 4 laps    2 laps 10' 8 laps 10'   Standing calf s' " 10x10'' ea 10x10'' ea 10x10'' ea 10x10'' ea 10x10'' ea    10x10'' ea 10x10''   NBOS on foam         3x30'' EC 3x30'' EC                             Ther Ex             Balance assessment             Ankle IV PROM             HR/TR 2x10 standing 2x10 standing 2x10  2x10 2x10    2x10 standing 2x10 standing   Seated DF/PF  2x10 Black TB ea 2x10 Black TB 2x10 Black TB 2x10 Black TB ea    2x10 Black TB ea 2x10 Black TB   HEP          Gastroc and Plantar fascia towel stretch   Bike (Cardiovascular Endurance)                                         Ther Activity                                       Gait Training                                       Modalities

## 2024-11-19 DIAGNOSIS — N95.2 VAGINAL ATROPHY: ICD-10-CM

## 2024-11-19 RX ORDER — ESTRADIOL 0.1 MG/G
1 CREAM VAGINAL 2 TIMES WEEKLY
Qty: 42.5 G | Refills: 1 | Status: SHIPPED | OUTPATIENT
Start: 2024-11-21

## 2024-11-21 ENCOUNTER — OFFICE VISIT (OUTPATIENT)
Dept: PHYSICAL THERAPY | Facility: CLINIC | Age: 69
End: 2024-11-21
Payer: MEDICARE

## 2024-11-21 DIAGNOSIS — M76.822 POSTERIOR TIBIAL TENDON DYSFUNCTION (PTTD) OF LEFT LOWER EXTREMITY: ICD-10-CM

## 2024-11-21 DIAGNOSIS — M19.071 ARTHRITIS OF RIGHT ANKLE: Primary | ICD-10-CM

## 2024-11-21 DIAGNOSIS — M21.42 PES PLANUS OF LEFT FOOT: ICD-10-CM

## 2024-11-21 PROCEDURE — 97112 NEUROMUSCULAR REEDUCATION: CPT | Performed by: PHYSICAL THERAPIST

## 2024-11-21 PROCEDURE — 97110 THERAPEUTIC EXERCISES: CPT | Performed by: PHYSICAL THERAPIST

## 2024-11-21 PROCEDURE — 97140 MANUAL THERAPY 1/> REGIONS: CPT | Performed by: PHYSICAL THERAPIST

## 2024-11-21 NOTE — PROGRESS NOTES
"Daily Note     Today's date: 2024  Patient name: Sandy Mcgovern  : 1955  MRN: [id number]  Referring provider: Bekah Isaacs DO  Dx:   Encounter Diagnosis     ICD-10-CM    1. Arthritis of right ankle  M19.071       2. Posterior tibial tendon dysfunction (PTTD) of left lower extremity  M76.822       3. Pes planus of left foot  M21.42                      Subjective: Sandy explains that her ankles are feeling good, she feels that her balance has improved since her vision has improved.       Objective: See treatment diary below      Assessment: Tolerated treatment well. Patient demonstrated fatigue post treatment and exhibited good technique with therapeutic exercises. Sandy responded well to all TE this session without any reproduction of knee discomfort. Continued challenge with eyes closed balance on foam. Observed ambulation without cane this morning, noted leg length discrepancy with shorter on the right, she is aware of this and has foot lifts to compensate.    Plan: Continue per plan of care.      Precautions: none      Manuals 9/5 9/12 10/10 10/31 11/14 11/21   8/22 8/29   Bilateral Ankle/foot PROM/mobs RN RN RN RN RN RN   RN RN   Left Ankle/Foot                                       Neuro Re-Ed             Ankle DF/PF             Ankle Inv Iso             TA SLR             Bridges             Ankle balance board 10x a/p, side 10x a/p, side X10 a/p, side large [place] X10 a/p, side large [place] X10 a/p, side large [place] X10 a/p, side large [place]   X10 a/p, side 10x ap, side   Semi tandem stance - EC R ant 2x20'' R ant 2x20'' R ant 2x20'' R ant 2x20'' R any 2x20'' R ant 3x20''   15x3'' R ant 2x20\"   Wobble board standing 2x10 a/p, side 2x10 a/p, side 2x10 a/p, side 2x10 a/p, side 2x10 a/p, side 2x10 a/p, side   2x10 a/p, side 2x10 ap, side   Standing tandem stance             NBOS EO/EC 3x15'' w/ head turns (L/R, U/D) 3x15'' w/ head turns (L/R, U/D) 3x15'' w/ head turns (L/R, U/D) 3x15'' w/ " head turns (L/R, U/D) 3x15'' w/ head turns (L/R, U/D) 3x15'' w/ head turns (L/R, U/D)   3x15'' w/ head turns 3x15'' w/ head turns (L/R, U/D) on foam   Tandem walking 4 laps 10' 4 laps  4 laps 4 laps 4 laps 4 laps   2 laps 10' 8 laps 10'   Standing calf s' 10x10'' ea 10x10'' ea 10x10'' ea 10x10'' ea 10x10'' ea 10x10'' ea   10x10'' ea 10x10''   NBOS on foam         3x30'' EC 3x30'' EC                             Ther Ex             Balance assessment             Ankle IV PROM             HR/TR 2x10 standing 2x10 standing 2x10  2x10 2x10    2x10 standing 2x10 standing   Seated DF/PF  2x10 Black TB ea 2x10 Black TB 2x10 Black TB 2x10 Black TB ea    2x10 Black TB ea 2x10 Black TB   HEP          Gastroc and Plantar fascia towel stretch   Bike (Cardiovascular Endurance)                                         Ther Activity                                       Gait Training                                       Modalities

## 2024-11-25 ENCOUNTER — OFFICE VISIT (OUTPATIENT)
Dept: FAMILY MEDICINE CLINIC | Facility: CLINIC | Age: 69
End: 2024-11-25
Payer: MEDICARE

## 2024-11-25 VITALS
WEIGHT: 171.4 LBS | BODY MASS INDEX: 29.26 KG/M2 | HEIGHT: 64 IN | OXYGEN SATURATION: 99 % | RESPIRATION RATE: 16 BRPM | HEART RATE: 64 BPM | DIASTOLIC BLOOD PRESSURE: 76 MMHG | SYSTOLIC BLOOD PRESSURE: 120 MMHG | TEMPERATURE: 98.4 F

## 2024-11-25 DIAGNOSIS — Z00.00 MEDICARE ANNUAL WELLNESS VISIT, SUBSEQUENT: Primary | ICD-10-CM

## 2024-11-25 DIAGNOSIS — E78.2 MIXED HYPERLIPIDEMIA: ICD-10-CM

## 2024-11-25 PROCEDURE — G0439 PPPS, SUBSEQ VISIT: HCPCS | Performed by: FAMILY MEDICINE

## 2024-11-25 NOTE — PATIENT INSTRUCTIONS
Medicare Preventive Visit Patient Instructions  Thank you for completing your Welcome to Medicare Visit or Medicare Annual Wellness Visit today. Your next wellness visit will be due in one year (11/26/2025).  The screening/preventive services that you may require over the next 5-10 years are detailed below. Some tests may not apply to you based off risk factors and/or age. Screening tests ordered at today's visit but not completed yet may show as past due. Also, please note that scanned in results may not display below.  Preventive Screenings:  Service Recommendations Previous Testing/Comments   Colorectal Cancer Screening  * Colonoscopy    * Fecal Occult Blood Test (FOBT)/Fecal Immunochemical Test (FIT)  * Fecal DNA/Cologuard Test  * Flexible Sigmoidoscopy Age: 45-75 years old   Colonoscopy: every 10 years (may be performed more frequently if at higher risk)  OR  FOBT/FIT: every 1 year  OR  Cologuard: every 3 years  OR  Sigmoidoscopy: every 5 years  Screening may be recommended earlier than age 45 if at higher risk for colorectal cancer. Also, an individualized decision between you and your healthcare provider will decide whether screening between the ages of 76-85 would be appropriate. Colonoscopy: 01/15/2013  FOBT/FIT: Not on file  Cologuard: Not on file  Sigmoidoscopy: Not on file          Breast Cancer Screening Age: 40+ years old  Frequency: every 1-2 years  Not required if history of left and right mastectomy Mammogram: 07/18/2023        Cervical Cancer Screening Between the ages of 21-29, pap smear recommended once every 3 years.   Between the ages of 30-65, can perform pap smear with HPV co-testing every 5 years.   Recommendations may differ for women with a history of total hysterectomy, cervical cancer, or abnormal pap smears in past. Pap Smear: 09/29/2023        Hepatitis C Screening Once for adults born between 1945 and 1965  More frequently in patients at high risk for Hepatitis C Hep C Antibody:  11/17/2020        Diabetes Screening 1-2 times per year if you're at risk for diabetes or have pre-diabetes Fasting glucose: No results in last 5 years (No results in last 5 years)  A1C: No results in last 5 years (No results in last 5 years)      Cholesterol Screening Once every 5 years if you don't have a lipid disorder. May order more often based on risk factors. Lipid panel: 12/08/2023          Other Preventive Screenings Covered by Medicare:  Abdominal Aortic Aneurysm (AAA) Screening: covered once if your at risk. You're considered to be at risk if you have a family history of AAA.  Lung Cancer Screening: covers low dose CT scan once per year if you meet all of the following conditions: (1) Age 55-77; (2) No signs or symptoms of lung cancer; (3) Current smoker or have quit smoking within the last 15 years; (4) You have a tobacco smoking history of at least 20 pack years (packs per day multiplied by number of years you smoked); (5) You get a written order from a healthcare provider.  Glaucoma Screening: covered annually if you're considered high risk: (1) You have diabetes OR (2) Family history of glaucoma OR (3)  aged 50 and older OR (4)  American aged 65 and older  Osteoporosis Screening: covered every 2 years if you meet one of the following conditions: (1) You're estrogen deficient and at risk for osteoporosis based off medical history and other findings; (2) Have a vertebral abnormality; (3) On glucocorticoid therapy for more than 3 months; (4) Have primary hyperparathyroidism; (5) On osteoporosis medications and need to assess response to drug therapy.   Last bone density test (DXA Scan): 05/19/2021.  HIV Screening: covered annually if you're between the age of 15-65. Also covered annually if you are younger than 15 and older than 65 with risk factors for HIV infection. For pregnant patients, it is covered up to 3 times per pregnancy.    Immunizations:  Immunization Recommendations    Influenza Vaccine Annual influenza vaccination during flu season is recommended for all persons aged >= 6 months who do not have contraindications   Pneumococcal Vaccine   * Pneumococcal conjugate vaccine = PCV13 (Prevnar 13), PCV15 (Vaxneuvance), PCV20 (Prevnar 20)  * Pneumococcal polysaccharide vaccine = PPSV23 (Pneumovax) Adults 19-63 yo with certain risk factors or if 65+ yo  If never received any pneumonia vaccine: recommend Prevnar 20 (PCV20)  Give PCV20 if previously received 1 dose of PCV13 or PPSV23   Hepatitis B Vaccine 3 dose series if at intermediate or high risk (ex: diabetes, end stage renal disease, liver disease)   Respiratory syncytial virus (RSV) Vaccine - COVERED BY MEDICARE PART D  * RSVPreF3 (Arexvy) CDC recommends that adults 60 years of age and older may receive a single dose of RSV vaccine using shared clinical decision-making (SCDM)   Tetanus (Td) Vaccine - COST NOT COVERED BY MEDICARE PART B Following completion of primary series, a booster dose should be given every 10 years to maintain immunity against tetanus. Td may also be given as tetanus wound prophylaxis.   Tdap Vaccine - COST NOT COVERED BY MEDICARE PART B Recommended at least once for all adults. For pregnant patients, recommended with each pregnancy.   Shingles Vaccine (Shingrix) - COST NOT COVERED BY MEDICARE PART B  2 shot series recommended in those 19 years and older who have or will have weakened immune systems or those 50 years and older     Health Maintenance Due:      Topic Date Due   • Colorectal Cancer Screening  01/15/2023   • Breast Cancer Screening: Mammogram  07/18/2024   • DXA SCAN  05/19/2026   • Hepatitis C Screening  Completed   • Cervical Cancer Screening  Discontinued     Immunizations Due:      Topic Date Due   • Influenza Vaccine (1) 09/01/2024   • COVID-19 Vaccine (4 - 2024-25 season) 09/01/2024     Advance Directives   What are advance directives?  Advance directives are legal documents that state your  wishes and plans for medical care. These plans are made ahead of time in case you lose your ability to make decisions for yourself. Advance directives can apply to any medical decision, such as the treatments you want, and if you want to donate organs.   What are the types of advance directives?  There are many types of advance directives, and each state has rules about how to use them. You may choose a combination of any of the following:  Living will:  This is a written record of the treatment you want. You can also choose which treatments you do not want, which to limit, and which to stop at a certain time. This includes surgery, medicine, IV fluid, and tube feedings.   Durable power of  for healthcare (DPAHC):  This is a written record that states who you want to make healthcare choices for you when you are unable to make them for yourself. This person, called a proxy, is usually a family member or a friend. You may choose more than 1 proxy.  Do not resuscitate (DNR) order:  A DNR order is used in case your heart stops beating or you stop breathing. It is a request not to have certain forms of treatment, such as CPR. A DNR order may be included in other types of advance directives.  Medical directive:  This covers the care that you want if you are in a coma, near death, or unable to make decisions for yourself. You can list the treatments you want for each condition. Treatment may include pain medicine, surgery, blood transfusions, dialysis, IV or tube feedings, and a ventilator (breathing machine).  Values history:  This document has questions about your views, beliefs, and how you feel and think about life. This information can help others choose the care that you would choose.  Why are advance directives important?  An advance directive helps you control your care. Although spoken wishes may be used, it is better to have your wishes written down. Spoken wishes can be misunderstood, or not followed.  Treatments may be given even if you do not want them. An advance directive may make it easier for your family to make difficult choices about your care.   Weight Management   Why it is important to manage your weight:  Being overweight increases your risk of health conditions such as heart disease, high blood pressure, type 2 diabetes, and certain types of cancer. It can also increase your risk for osteoarthritis, sleep apnea, and other respiratory problems. Aim for a slow, steady weight loss. Even a small amount of weight loss can lower your risk of health problems.  How to lose weight safely:  A safe and healthy way to lose weight is to eat fewer calories and get regular exercise. You can lose up about 1 pound a week by decreasing the number of calories you eat by 500 calories each day.   Healthy meal plan for weight management:  A healthy meal plan includes a variety of foods, contains fewer calories, and helps you stay healthy. A healthy meal plan includes the following:  Eat whole-grain foods more often.  A healthy meal plan should contain fiber. Fiber is the part of grains, fruits, and vegetables that is not broken down by your body. Whole-grain foods are healthy and provide extra fiber in your diet. Some examples of whole-grain foods are whole-wheat breads and pastas, oatmeal, brown rice, and bulgur.  Eat a variety of vegetables every day.  Include dark, leafy greens such as spinach, kale, qi greens, and mustard greens. Eat yellow and orange vegetables such as carrots, sweet potatoes, and winter squash.   Eat a variety of fruits every day.  Choose fresh or canned fruit (canned in its own juice or light syrup) instead of juice. Fruit juice has very little or no fiber.  Eat low-fat dairy foods.  Drink fat-free (skim) milk or 1% milk. Eat fat-free yogurt and low-fat cottage cheese. Try low-fat cheeses such as mozzarella and other reduced-fat cheeses.  Choose meat and other protein foods that are low in fat.   Choose beans or other legumes such as split peas or lentils. Choose fish, skinless poultry (chicken or turkey), or lean cuts of red meat (beef or pork). Before you cook meat or poultry, cut off any visible fat.   Use less fat and oil.  Try baking foods instead of frying them. Add less fat, such as margarine, sour cream, regular salad dressing and mayonnaise to foods. Eat fewer high-fat foods. Some examples of high-fat foods include french fries, doughnuts, ice cream, and cakes.  Eat fewer sweets.  Limit foods and drinks that are high in sugar. This includes candy, cookies, regular soda, and sweetened drinks.  Exercise:  Exercise at least 30 minutes per day on most days of the week. Some examples of exercise include walking, biking, dancing, and swimming. You can also fit in more physical activity by taking the stairs instead of the elevator or parking farther away from stores. Ask your healthcare provider about the best exercise plan for you.      © Copyright [employer] 2018 Information is for End User's use only and may not be sold, redistributed or otherwise used for commercial purposes. All illustrations and images included in CareNotes® are the copyrighted property of [employer] Inc[employer] or [employer]

## 2024-11-25 NOTE — ASSESSMENT & PLAN NOTE
Orders:    Lipid panel; Future    Comprehensive metabolic panel; Future    CBC and differential; Future    TSH, 3rd generation with Free T4 reflex; Future

## 2024-11-25 NOTE — PROGRESS NOTES
Name: Sandy Mcgovern      : 1955      MRN: [id number]  Encounter Provider: Bekah Isaacs DO  Encounter Date: 2024   Encounter department: [facility]    Assessment & Plan  Medicare annual wellness visit, subsequent    Orders:    Lipid panel; Future    Comprehensive metabolic panel; Future    CBC and differential; Future    TSH, 3rd generation with Free T4 reflex; Future    Mixed hyperlipidemia    Orders:    Lipid panel; Future    Comprehensive metabolic panel; Future    CBC and differential; Future    TSH, 3rd generation with Free T4 reflex; Future       Preventive health issues were discussed with patient, and age appropriate screening tests were ordered as noted in patient's After Visit Summary. Personalized health advice and appropriate referrals for health education or preventive services given if needed, as noted in patient's After Visit Summary.    History of Present Illness     HPI   Patient Care Team:  Bekah Isaacs DO as PCP - General (Family Medicine)    Review of Systems   Constitutional:  Negative for chills, fatigue and fever.   HENT:  Negative for congestion, postnasal drip, rhinorrhea and sinus pressure.    Eyes:  Negative for photophobia and visual disturbance.   Respiratory:  Negative for cough and shortness of breath.    Cardiovascular:  Negative for chest pain, palpitations and leg swelling.   Gastrointestinal:  Negative for abdominal pain, constipation, diarrhea, nausea and vomiting.   Genitourinary:  Negative for difficulty urinating and dysuria.   Musculoskeletal:  Negative for arthralgias and myalgias.   Skin:  Negative for color change and rash.   Neurological:  Negative for dizziness, weakness, light-headedness and headaches.     Medical History Reviewed by provider this encounter:  Tobacco  Allergies  Meds  Problems  Med Hx  Surg Hx  Fam Hx       Annual Wellness Visit Questionnaire   Sandy is here for her Subsequent Wellness visit.  Last Medicare Wellness visit information reviewed, patient interviewed, no change since last AWV.     Health Risk Assessment:   Patient rates overall health as good. Patient feels that their physical health rating is same. Patient is satisfied with their life. Eyesight was rated as same. Hearing was rated as same. Patient feels that their emotional and mental health rating is same. Patients states they are never, rarely angry. Patient states they are never, rarely unusually tired/fatigued. Pain experienced in the last 7 days has been none. Patient states that she has experienced no weight loss or gain in last 6 months.     Depression Screening:   PHQ-2 Score: 2      Fall Risk Screening:   In the past year, patient has experienced: no history of falling in past year      Urinary Incontinence Screening:   Patient has not leaked urine accidently in the last six months.     Home Safety:  Patient does not have trouble with stairs inside or outside of their home. Patient has working smoke alarms and has working carbon monoxide detector. Home safety hazards include: none.     Nutrition:   Current diet is Regular.     Medications:   Patient is not currently taking any over-the-counter supplements. Patient is able to manage medications.     Activities of Daily Living (ADLs)/Instrumental Activities of Daily Living (IADLs):   Walk and transfer into and out of bed and chair?: Yes  Dress and groom yourself?: Yes    Bathe or shower yourself?: Yes    Feed yourself? Yes  Do your laundry/housekeeping?: Yes  Manage your money, pay your bills and track your expenses?: Yes  Make your own meals?: Yes    Do your own shopping?: Yes    Previous Hospitalizations:   Any hospitalizations or ED visits within the last 12 months?: No      Advance Care Planning:   Living will: No    Advanced directive: Yes    Advanced directive counseling given: Yes      Cognitive Screening:   Provider or family/friend/caregiver concerned regarding cognition?:  "No    PREVENTIVE SCREENINGS      Cardiovascular Screening:    General: Screening Not Indicated, History Lipid Disorder and Risks and Benefits Discussed    Due for: Lipid Panel      Diabetes Screening:     General: Screening Current and Risks and Benefits Discussed    Due for: Blood Glucose      Breast Cancer Screening:     General: Screening Current      Cervical Cancer Screening:    General: Screening Not Indicated      Osteoporosis Screening:    General: Screening Current      Lung Cancer Screening:     General: Screening Not Indicated      Hepatitis C Screening:    General: Screening Current    Screening, Brief Intervention, and Referral to Treatment (SBIRT)    Screening  Typical number of drinks in a day: 0  Typical number of drinks in a week: 0  Interpretation: Low risk drinking behavior.    Single Item Drug Screening:  How often have you used an illegal drug (including marijuana) or a prescription medication for non-medical reasons in the past year? never    Single Item Drug Screen Score: 0  Interpretation: Negative screen for possible drug use disorder    Social Drivers of Health     Financial Resource Strain: Low Risk  (11/18/2022)    Overall Financial Resource Strain (CARDIA)     Difficulty of Paying Living Expenses: Not hard at all   Transportation Needs: No Transportation Needs (11/18/2022)    PRAPARE - Transportation     Lack of Transportation (Medical): No     Lack of Transportation (Non-Medical): No     No results found.    Objective   /76 (BP Location: Left arm, Patient Position: Sitting, Cuff Size: Large)   Pulse 64   Temp 98.4 °F (36.9 °C) (Tympanic)   Resp 16   Ht 5' 3.6\" (1.615 m)   Wt 77.7 kg (171 lb 6.4 oz)   LMP  (LMP Unknown)   SpO2 99%   BMI 29.79 kg/m²     Physical Exam  Constitutional:       General: She is not in acute distress.     Appearance: Normal appearance. She is not ill-appearing, toxic-appearing or diaphoretic.   HENT:      Head: Normocephalic and atraumatic.      " Right Ear: Tympanic membrane and ear canal normal.      Left Ear: Tympanic membrane and ear canal normal.      Nose: Nose normal. No congestion.      Mouth/Throat:      Mouth: Mucous membranes are moist.      Pharynx: Oropharynx is clear. No oropharyngeal exudate.   Eyes:      Extraocular Movements: Extraocular movements intact.      Conjunctiva/sclera: Conjunctivae normal.      Pupils: Pupils are equal, round, and reactive to light.   Cardiovascular:      Rate and Rhythm: Normal rate and regular rhythm.      Pulses: Normal pulses.      Heart sounds: No murmur heard.  Pulmonary:      Effort: Pulmonary effort is normal.      Breath sounds: Normal breath sounds. No wheezing, rhonchi or rales.   Abdominal:      General: Bowel sounds are normal. There is no distension.      Palpations: Abdomen is soft.      Tenderness: There is no abdominal tenderness.   Musculoskeletal:         General: No swelling or tenderness. Normal range of motion.      Cervical back: Normal range of motion and neck supple.   Skin:     General: Skin is warm and dry.      Capillary Refill: Capillary refill takes less than 2 seconds.   Neurological:      General: No focal deficit present.      Mental Status: She is alert and oriented to person, place, and time.      Cranial Nerves: No cranial nerve deficit.   Psychiatric:         Mood and Affect: Mood normal.         Behavior: Behavior normal.         Thought Content: Thought content normal.

## 2024-11-29 LAB — COLOGUARD RESULT REPORTABLE: NEGATIVE

## 2024-12-01 ENCOUNTER — RESULTS FOLLOW-UP (OUTPATIENT)
Dept: FAMILY MEDICINE CLINIC | Facility: CLINIC | Age: 69
End: 2024-12-01

## 2024-12-05 ENCOUNTER — APPOINTMENT (OUTPATIENT)
Dept: PHYSICAL THERAPY | Facility: CLINIC | Age: 69
End: 2024-12-05
Payer: MEDICARE

## 2024-12-11 LAB
ALBUMIN SERPL-MCNC: 4.3 G/DL (ref 3.8–4.8)
ALP SERPL-CCNC: 80 IU/L (ref 44–121)
ALT SERPL-CCNC: 13 IU/L (ref 0–32)
AST SERPL-CCNC: 15 IU/L (ref 0–40)
BASOPHILS # BLD AUTO: 0 X10E3/UL (ref 0–0.2)
BASOPHILS NFR BLD AUTO: 1 %
BILIRUB SERPL-MCNC: 1.2 MG/DL (ref 0–1.2)
BUN SERPL-MCNC: 13 MG/DL (ref 8–27)
BUN/CREAT SERPL: 25 (ref 12–28)
CALCIUM SERPL-MCNC: 9 MG/DL (ref 8.7–10.3)
CHLORIDE SERPL-SCNC: 103 MMOL/L (ref 96–106)
CHOLEST SERPL-MCNC: 166 MG/DL (ref 100–199)
CHOLEST/HDLC SERPL: 2.9 RATIO (ref 0–4.4)
CO2 SERPL-SCNC: 25 MMOL/L (ref 20–29)
CREAT SERPL-MCNC: 0.52 MG/DL (ref 0.57–1)
EGFR: 99 ML/MIN/1.73
EOSINOPHIL # BLD AUTO: 0.2 X10E3/UL (ref 0–0.4)
EOSINOPHIL NFR BLD AUTO: 5 %
ERYTHROCYTE [DISTWIDTH] IN BLOOD BY AUTOMATED COUNT: 13.2 % (ref 11.7–15.4)
GLOBULIN SER-MCNC: 2.1 G/DL (ref 1.5–4.5)
GLUCOSE SERPL-MCNC: 85 MG/DL (ref 70–99)
HCT VFR BLD AUTO: 41.4 % (ref 34–46.6)
HDLC SERPL-MCNC: 57 MG/DL
HGB BLD-MCNC: 13.3 G/DL (ref 11.1–15.9)
IMM GRANULOCYTES # BLD: 0 X10E3/UL (ref 0–0.1)
IMM GRANULOCYTES NFR BLD: 0 %
LDLC SERPL CALC-MCNC: 97 MG/DL (ref 0–99)
LYMPHOCYTES # BLD AUTO: 1.2 X10E3/UL (ref 0.7–3.1)
LYMPHOCYTES NFR BLD AUTO: 34 %
MCH RBC QN AUTO: 29.2 PG (ref 26.6–33)
MCHC RBC AUTO-ENTMCNC: 32.1 G/DL (ref 31.5–35.7)
MCV RBC AUTO: 91 FL (ref 79–97)
MONOCYTES # BLD AUTO: 0.4 X10E3/UL (ref 0.1–0.9)
MONOCYTES NFR BLD AUTO: 11 %
NEUTROPHILS # BLD AUTO: 1.7 X10E3/UL (ref 1.4–7)
NEUTROPHILS NFR BLD AUTO: 49 %
PLATELET # BLD AUTO: 232 X10E3/UL (ref 150–450)
POTASSIUM SERPL-SCNC: 4.4 MMOL/L (ref 3.5–5.2)
PROT SERPL-MCNC: 6.4 G/DL (ref 6–8.5)
RBC # BLD AUTO: 4.56 X10E6/UL (ref 3.77–5.28)
SL AMB VLDL CHOLESTEROL CALC: 12 MG/DL (ref 5–40)
SODIUM SERPL-SCNC: 140 MMOL/L (ref 134–144)
TRIGL SERPL-MCNC: 58 MG/DL (ref 0–149)
TSH SERPL DL<=0.005 MIU/L-ACNC: 2.38 UIU/ML (ref 0.45–4.5)
WBC # BLD AUTO: 3.4 X10E3/UL (ref 3.4–10.8)

## 2024-12-12 ENCOUNTER — OFFICE VISIT (OUTPATIENT)
Dept: PHYSICAL THERAPY | Facility: CLINIC | Age: 69
End: 2024-12-12
Payer: MEDICARE

## 2024-12-12 ENCOUNTER — RESULTS FOLLOW-UP (OUTPATIENT)
Dept: FAMILY MEDICINE CLINIC | Facility: CLINIC | Age: 69
End: 2024-12-12

## 2024-12-12 ENCOUNTER — OFFICE VISIT (OUTPATIENT)
Dept: OBGYN CLINIC | Facility: CLINIC | Age: 69
End: 2024-12-12
Payer: MEDICARE

## 2024-12-12 VITALS
WEIGHT: 170 LBS | BODY MASS INDEX: 29.02 KG/M2 | DIASTOLIC BLOOD PRESSURE: 66 MMHG | HEIGHT: 64 IN | SYSTOLIC BLOOD PRESSURE: 122 MMHG

## 2024-12-12 DIAGNOSIS — N81.3 UTERINE PROCIDENTIA: ICD-10-CM

## 2024-12-12 DIAGNOSIS — Z01.419 ENCOUNTER FOR ANNUAL ROUTINE GYNECOLOGICAL EXAMINATION: Primary | ICD-10-CM

## 2024-12-12 DIAGNOSIS — M19.071 ARTHRITIS OF RIGHT ANKLE: Primary | ICD-10-CM

## 2024-12-12 DIAGNOSIS — Z96.0 VAGINAL PESSARY IN SITU: ICD-10-CM

## 2024-12-12 DIAGNOSIS — Z12.31 ENCOUNTER FOR SCREENING MAMMOGRAM FOR MALIGNANT NEOPLASM OF BREAST: ICD-10-CM

## 2024-12-12 DIAGNOSIS — M76.822 POSTERIOR TIBIAL TENDON DYSFUNCTION (PTTD) OF LEFT LOWER EXTREMITY: ICD-10-CM

## 2024-12-12 DIAGNOSIS — N95.2 VAGINAL ATROPHY: ICD-10-CM

## 2024-12-12 DIAGNOSIS — M21.42 PES PLANUS OF LEFT FOOT: ICD-10-CM

## 2024-12-12 PROCEDURE — 97140 MANUAL THERAPY 1/> REGIONS: CPT | Performed by: PHYSICAL THERAPIST

## 2024-12-12 PROCEDURE — 97112 NEUROMUSCULAR REEDUCATION: CPT | Performed by: PHYSICAL THERAPIST

## 2024-12-12 PROCEDURE — 99213 OFFICE O/P EST LOW 20 MIN: CPT | Performed by: STUDENT IN AN ORGANIZED HEALTH CARE EDUCATION/TRAINING PROGRAM

## 2024-12-12 PROCEDURE — G0101 CA SCREEN;PELVIC/BREAST EXAM: HCPCS | Performed by: STUDENT IN AN ORGANIZED HEALTH CARE EDUCATION/TRAINING PROGRAM

## 2024-12-12 PROCEDURE — 97110 THERAPEUTIC EXERCISES: CPT | Performed by: PHYSICAL THERAPIST

## 2024-12-12 NOTE — PROGRESS NOTES
"Daily Note     Today's date: 2024  Patient name: Sandy Mcgovern  : 1955  MRN: [id number]  Referring provider: Bekah Isaacs DO  Dx:   Encounter Diagnosis     ICD-10-CM    1. Arthritis of right ankle  M19.071       2. Posterior tibial tendon dysfunction (PTTD) of left lower extremity  M76.822       3. Pes planus of left foot  M21.42                      Subjective: Sandy explains that she was challenged with standing endurance recently at the [place], she feels well overall today.       Objective: See treatment diary below      Assessment: Tolerated treatment well. Patient demonstrated fatigue post treatment and exhibited good technique with therapeutic exercises. Sandy responded well to all TE today with improved stability on foam and continued subjective relief with manuals this afternoon. Continue to progress ankle mobility and strength nv.      Plan: Continue per plan of care.      Precautions: none      Manuals 9/5 9/12 10/10 10/31 11/14 11/21 12/12  8/22 8/29   Bilateral Ankle/foot PROM/mobs RN RN RN RN RN RN RN  RN RN   Left Ankle/Foot                                       Neuro Re-Ed             Ankle DF/PF             Ankle Inv Iso             TA SLR             Bridges             Ankle balance board 10x a/p, side 10x a/p, side X10 a/p, side large [place] X10 a/p, side large [place] X10 a/p, side large [place] X10 a/p, side large [place] X10 a/p, side large [place]  X10 a/p, side 10x ap, side   Semi tandem stance - EC R ant 2x20'' R ant 2x20'' R ant 2x20'' R ant 2x20'' R any 2x20'' R ant 3x20'' R ant 3x20''  15x3'' R ant 2x20\"   Wobble board standing 2x10 a/p, side 2x10 a/p, side 2x10 a/p, side 2x10 a/p, side 2x10 a/p, side 2x10 a/p, side 2x10 a/p, side  2x10 a/p, side 2x10 ap, side   Standing tandem stance             NBOS EO/EC 3x15'' w/ head turns (L/R, U/D) 3x15'' w/ head turns (L/R, U/D) 3x15'' w/ head turns (L/R, U/D) 3x15'' w/ head turns (L/R, U/D) 3x15'' w/ head turns " (L/R, U/D) 3x15'' w/ head turns (L/R, U/D) 3x15'' w/ head turns (L/R, U/D)  3x15'' w/ head turns 3x15'' w/ head turns (L/R, U/D) on foam   Tandem walking 4 laps 10' 4 laps  4 laps 4 laps 4 laps 4 laps 4 laps  2 laps 10' 8 laps 10'   Standing calf s' 10x10'' ea 10x10'' ea 10x10'' ea 10x10'' ea 10x10'' ea 10x10'' ea 10x10'' ea  10x10'' ea 10x10''   NBOS on foam         3x30'' EC 3x30'' EC                             Ther Ex             Balance assessment             Ankle IV PROM             HR/TR 2x10 standing 2x10 standing 2x10  2x10 2x10    2x10 standing 2x10 standing   Seated DF/PF  2x10 Black TB ea 2x10 Black TB 2x10 Black TB 2x10 Black TB ea  2x10 Black TB ea  2x10 Black TB ea 2x10 Black TB   HEP          Gastroc and Plantar fascia towel stretch   Bike (Cardiovascular Endurance)                                         Ther Activity                                       Gait Training                                       Modalities

## 2024-12-12 NOTE — ASSESSMENT & PLAN NOTE
- Continue Trimo-Gu twice weekly.   - Pessary removed, cleaned, and replaced.   - Return to office for routine pessary maintenance in 3 months.   Orders:    Pessary

## 2024-12-12 NOTE — PROGRESS NOTES
Name: Sandy Mcgovern      : 1955      MRN: [id number]  Encounter Provider: Jeremiah Peres MD  Encounter Date: 2024   Encounter department: [facility] [place]  :  Assessment & Plan  Encounter for annual routine gynecological examination  - Routine well gynecologic exam completed today.  - Cervical Cancer Screening complete. History of abnormal: None  - STI screening offered including HIV testing: offered, pt declined  - Breast Cancer Screening: Last Mammogram 2023, repeat is scheduled for 2025  - Colorectal cancer screening not ordered, as she completed Cologuard this year.   - Osteoporosis screening: Normal DXA in . No change in risk factors.   - The following were reviewed in today's visit: breast self exam, exercise, and healthy diet        Vaginal pessary in situ  - Continue Trimo-Gu twice weekly.   - Pessary removed, cleaned, and replaced.   - Return to office for routine pessary maintenance in 3 months.   Orders:    Pessary    Vaginal atrophy  - Continue Estrace cream twice weekly.       Uterine procidentia         Encounter for screening mammogram for malignant neoplasm of breast    Orders:    Mammo screening bilateral w 3d and cad; Future    Pessary    Date/Time: 2024 9:30 AM    Performed by: Jeremiah Peres MD  Authorized by: Jeremiah Peres MD  Universal Protocol:  procedure performed by consultantConsent: Verbal consent obtained.  Risks and benefits: risks, benefits and alternatives were discussed  Patient understanding: patient states understanding of the procedure being performed  Patient consent: the patient's understanding of the procedure matches consent given  Procedure consent: procedure consent matches procedure scheduled  Patient identity confirmed: verbally with patient    Indication:     Indication for pessary: uterine prolapse, cystocele and rectocele    Pre-procedure:     Pessary procedure type:  Cleaning/check  Procedure:      "Pessary type:  Ring w/ support    Pessary size:  7    Patient tolerance of procedure:  Tolerated well, no immediate complications        History of Present Illness   Gynecologic Exam      Sandy Mcgovern is a 69 y.o. female who presents for routine gynecologic preventative care visit and pessary maintenance. She is without complaint today. She denies vaginal bleeding, discharge, or other issues with pessary. Her prolapse symptoms are well controlled.   History obtained from: patient    Review of Systems  Medical History Reviewed by provider this encounter:  Meds  Med Hx  Surg Hx  Fam Hx     .     Objective   /66 (BP Location: Left arm, Patient Position: Sitting, Cuff Size: Standard)   Ht 5' 3.5\" (1.613 m)   Wt 77.1 kg (170 lb)   LMP  (LMP Unknown)   BMI 29.64 kg/m²      Physical Exam  Vitals reviewed. Exam conducted with a chaperone present (Tish Sam MA).   Constitutional:       General: She is not in acute distress.     Appearance: Normal appearance. She is well-developed.   HENT:      Head: Normocephalic.   Eyes:      Conjunctiva/sclera: Conjunctivae normal.   Cardiovascular:      Rate and Rhythm: Normal rate.   Pulmonary:      Effort: Pulmonary effort is normal. No respiratory distress.   Chest:      Chest wall: No mass or deformity.   Breasts:     Right: Normal. No mass, nipple discharge, skin change or tenderness.      Left: Normal. No mass, nipple discharge, skin change or tenderness.   Abdominal:      General: Abdomen is flat.      Palpations: Abdomen is soft.      Tenderness: There is no abdominal tenderness.   Genitourinary:     General: Normal vulva.      Exam position: Lithotomy position.      Labia:         Right: No tenderness, lesion or injury.         Left: No tenderness, lesion or injury.       Urethra: No urethral pain, urethral swelling or urethral lesion.      Vagina: Prolapsed vaginal walls present. No vaginal discharge, bleeding or lesions.      Cervix: Normal. No cervical " motion tenderness, discharge, friability, lesion, erythema or cervical bleeding.      Uterus: Normal. With uterine prolapse. Not deviated and not tender.       Adnexa: Right adnexa normal and left adnexa normal.        Right: No mass, tenderness or fullness.          Left: No mass, tenderness or fullness.        Comments: Complete procidentia. Atrophic mucosa. No erosions. Normal-appearing physiologic discharge. No bleeding noted.   Musculoskeletal:         General: No swelling.   Lymphadenopathy:      Upper Body:      Right upper body: No supraclavicular, axillary or pectoral adenopathy.      Left upper body: No supraclavicular, axillary or pectoral adenopathy.   Skin:     General: Skin is warm and dry.   Neurological:      Mental Status: She is alert.   Psychiatric:         Mood and Affect: Mood normal.         Behavior: Behavior normal.         Thought Content: Thought content normal.         Judgment: Judgment normal.     Jeremiah Peres MD  12/12/2024 9:42 AM

## 2024-12-19 ENCOUNTER — OFFICE VISIT (OUTPATIENT)
Dept: PHYSICAL THERAPY | Facility: CLINIC | Age: 69
End: 2024-12-19
Payer: MEDICARE

## 2024-12-19 DIAGNOSIS — M19.071 ARTHRITIS OF RIGHT ANKLE: Primary | ICD-10-CM

## 2024-12-19 DIAGNOSIS — M76.822 POSTERIOR TIBIAL TENDON DYSFUNCTION (PTTD) OF LEFT LOWER EXTREMITY: ICD-10-CM

## 2024-12-19 DIAGNOSIS — M21.42 PES PLANUS OF LEFT FOOT: ICD-10-CM

## 2024-12-19 PROCEDURE — 97112 NEUROMUSCULAR REEDUCATION: CPT | Performed by: PHYSICAL THERAPIST

## 2024-12-19 PROCEDURE — 97140 MANUAL THERAPY 1/> REGIONS: CPT | Performed by: PHYSICAL THERAPIST

## 2024-12-19 PROCEDURE — 97110 THERAPEUTIC EXERCISES: CPT | Performed by: PHYSICAL THERAPIST

## 2024-12-19 NOTE — PROGRESS NOTES
"Daily Note     Today's date: 2024  Patient name: Sandy Mcgovern  : 1955  MRN: [id number]  Referring provider: Bekah Isaacs DO  Dx:   Encounter Diagnosis     ICD-10-CM    1. Arthritis of right ankle  M19.071       2. Posterior tibial tendon dysfunction (PTTD) of left lower extremity  M76.822       3. Pes planus of left foot  M21.42                      Subjective: Sandy explains that she is feeling a bit more soreness in the left ankle recently, she attributes this to the weather.       Objective: See treatment diary below      Assessment: Tolerated treatment well. Patient demonstrated fatigue post treatment and exhibited good technique with therapeutic exercises. Sandy responded well to all TE today with improved stability on foam and continued subjective relief with manuals this afternoon. She had a reduced need for rest breaks this session. Continue to progress ankle mobility and strength nv.       Plan: Continue per plan of care.      Precautions: none      Manuals 9/5 9/12 10/10 10/31 11/14 11/21 12/12 12/19 8/22 8/29   Bilateral Ankle/foot PROM/mobs RN RN RN RN RN RN RN RN RN RN   Left Ankle/Foot                                       Neuro Re-Ed             Ankle DF/PF             Ankle Inv Iso             TA SLR             Bridges             Ankle balance board 10x a/p, side 10x a/p, side X10 a/p, side large [place] X10 a/p, side large [place] X10 a/p, side large [place] X10 a/p, side large [place] X10 a/p, side large [place] X10 a/p, side large [place] X10 a/p, side 10x ap, side   Semi tandem stance - EC R ant 2x20'' R ant 2x20'' R ant 2x20'' R ant 2x20'' R any 2x20'' R ant 3x20'' R ant 3x20'' R ant 3x20'' 15x3'' R ant 2x20\"   Wobble board standing 2x10 a/p, side 2x10 a/p, side 2x10 a/p, side 2x10 a/p, side 2x10 a/p, side 2x10 a/p, side 2x10 a/p, side 2x10, a/p, side 2x10 a/p, side 2x10 ap, side   Standing tandem stance             NBOS EO/EC 3x15'' w/ head turns (L/R, U/D) 3x15'' w/ head " turns (L/R, U/D) 3x15'' w/ head turns (L/R, U/D) 3x15'' w/ head turns (L/R, U/D) 3x15'' w/ head turns (L/R, U/D) 3x15'' w/ head turns (L/R, U/D) 3x15'' w/ head turns (L/R, U/D) 3x15'' w/ head turns (L/R, U/D) 3x15'' w/ head turns 3x15'' w/ head turns (L/R, U/D) on foam   Tandem walking 4 laps 10' 4 laps  4 laps 4 laps 4 laps 4 laps 4 laps 4 laps 2 laps 10' 8 laps 10'   Standing calf s' 10x10'' ea 10x10'' ea 10x10'' ea 10x10'' ea 10x10'' ea 10x10'' ea 10x10'' ea 10x10'' ea 10x10'' ea 10x10''   NBOS on foam         3x30'' EC 3x30'' EC                             Ther Ex             Balance assessment             Ankle IV PROM             HR/TR 2x10 standing 2x10 standing 2x10  2x10 2x10    2x10 standing 2x10 standing   Seated DF/PF  2x10 Black TB ea 2x10 Black TB 2x10 Black TB 2x10 Black TB ea  2x10 Black TB ea 2x10 Black TB ea 2x10 Black TB ea 2x10 Black TB   HEP          Gastroc and Plantar fascia towel stretch   Bike (Cardiovascular Endurance)                                         Ther Activity                                       Gait Training                                       Modalities

## 2025-01-09 ENCOUNTER — APPOINTMENT (OUTPATIENT)
Dept: PHYSICAL THERAPY | Facility: CLINIC | Age: 70
End: 2025-01-09
Payer: MEDICARE

## 2025-01-15 ENCOUNTER — OFFICE VISIT (OUTPATIENT)
Dept: PHYSICAL THERAPY | Facility: CLINIC | Age: 70
End: 2025-01-15
Payer: MEDICARE

## 2025-01-15 DIAGNOSIS — M76.822 POSTERIOR TIBIAL TENDON DYSFUNCTION (PTTD) OF LEFT LOWER EXTREMITY: ICD-10-CM

## 2025-01-15 DIAGNOSIS — M21.42 PES PLANUS OF LEFT FOOT: ICD-10-CM

## 2025-01-15 DIAGNOSIS — M19.071 ARTHRITIS OF RIGHT ANKLE: Primary | ICD-10-CM

## 2025-01-15 PROCEDURE — 97110 THERAPEUTIC EXERCISES: CPT | Performed by: PHYSICAL THERAPIST

## 2025-01-15 PROCEDURE — 97112 NEUROMUSCULAR REEDUCATION: CPT | Performed by: PHYSICAL THERAPIST

## 2025-01-15 PROCEDURE — 97140 MANUAL THERAPY 1/> REGIONS: CPT | Performed by: PHYSICAL THERAPIST

## 2025-01-15 NOTE — PROGRESS NOTES
"Daily Note     Today's date: 1/15/2025  Patient name: Sandy Mcgovern  : 1955  MRN: [id number]  Referring provider: Bekah Isaacs DO  Dx:   Encounter Diagnosis     ICD-10-CM    1. Arthritis of right ankle  M19.071       2. Posterior tibial tendon dysfunction (PTTD) of left lower extremity  M76.822       3. Pes planus of left foot  M21.42                      Subjective: Sandy explains that she has been feeling less soreness overall.      Objective: See treatment diary below      Assessment: Tolerated treatment well. Patient demonstrated fatigue post treatment and exhibited good technique with therapeutic exercises. Sandy responded well to all TE today with improved stability on foam and continued subjective relief with manuals this afternoon. She had a reduced need for rest breaks this session. Continue to progress ankle mobility and strength nv.       Plan: Continue per plan of care.      Precautions: none      Manuals 9/5 9/12 10/10 10/31 11/14 11/21 12/12 12/19 1/15 8/29   Bilateral Ankle/foot PROM/mobs RN RN RN RN RN RN RN RN RN RN   Left Ankle/Foot                                       Neuro Re-Ed             Ankle DF/PF             Ankle Inv Iso             TA SLR             Bridges             Ankle balance board 10x a/p, side 10x a/p, side X10 a/p, side large [place] X10 a/p, side large [place] X10 a/p, side large [place] X10 a/p, side large [place] X10 a/p, side large [place] X10 a/p, side large [place] X10 a/p, side 10x ap, side   Semi tandem stance - EC R ant 2x20'' R ant 2x20'' R ant 2x20'' R ant 2x20'' R any 2x20'' R ant 3x20'' R ant 3x20'' R ant 3x20'' 15x3'' R ant 2x20\"   Wobble board standing 2x10 a/p, side 2x10 a/p, side 2x10 a/p, side 2x10 a/p, side 2x10 a/p, side 2x10 a/p, side 2x10 a/p, side 2x10, a/p, side 2x10 a/p, side 2x10 ap, side   Standing tandem stance             NBOS EO/EC 3x15'' w/ head turns (L/R, U/D) 3x15'' w/ head turns (L/R, U/D) 3x15'' w/ head turns (L/R, U/D) 3x15'' " w/ head turns (L/R, U/D) 3x15'' w/ head turns (L/R, U/D) 3x15'' w/ head turns (L/R, U/D) 3x15'' w/ head turns (L/R, U/D) 3x15'' w/ head turns (L/R, U/D) 3x15'' w/ head turns 3x15'' w/ head turns (L/R, U/D) on foam   Tandem walking 4 laps 10' 4 laps  4 laps 4 laps 4 laps 4 laps 4 laps 4 laps 2 laps 10' 8 laps 10'   Standing calf s' 10x10'' ea 10x10'' ea 10x10'' ea 10x10'' ea 10x10'' ea 10x10'' ea 10x10'' ea 10x10'' ea 10x10'' ea 10x10''   NBOS on foam         3x30'' EC 3x30'' EC                             Ther Ex             Balance assessment             Ankle IV PROM             HR/TR 2x10 standing 2x10 standing 2x10  2x10 2x10    2x10 standing 2x10 standing   Seated DF/PF  2x10 Black TB ea 2x10 Black TB 2x10 Black TB 2x10 Black TB ea  2x10 Black TB ea 2x10 Black TB ea 2x10 Black TB ea 2x10 Black TB   HEP          Gastroc and Plantar fascia towel stretch   Bike (Cardiovascular Endurance)                                         Ther Activity                                       Gait Training                                       Modalities

## 2025-01-22 ENCOUNTER — APPOINTMENT (OUTPATIENT)
Dept: PHYSICAL THERAPY | Facility: CLINIC | Age: 70
End: 2025-01-22
Payer: MEDICARE

## 2025-01-29 ENCOUNTER — OFFICE VISIT (OUTPATIENT)
Dept: PHYSICAL THERAPY | Facility: CLINIC | Age: 70
End: 2025-01-29
Payer: MEDICARE

## 2025-01-29 DIAGNOSIS — M76.822 POSTERIOR TIBIAL TENDON DYSFUNCTION (PTTD) OF LEFT LOWER EXTREMITY: ICD-10-CM

## 2025-01-29 DIAGNOSIS — M19.071 ARTHRITIS OF RIGHT ANKLE: Primary | ICD-10-CM

## 2025-01-29 DIAGNOSIS — M21.42 PES PLANUS OF LEFT FOOT: ICD-10-CM

## 2025-01-29 PROCEDURE — 97164 PT RE-EVAL EST PLAN CARE: CPT | Performed by: PHYSICAL THERAPIST

## 2025-01-29 PROCEDURE — 97110 THERAPEUTIC EXERCISES: CPT | Performed by: PHYSICAL THERAPIST

## 2025-01-29 PROCEDURE — 97140 MANUAL THERAPY 1/> REGIONS: CPT | Performed by: PHYSICAL THERAPIST

## 2025-01-29 NOTE — PROGRESS NOTES
PT Re-Evaluation     Today's date: 2025  Patient name: Sandy Mcgovern  : 1955  MRN: [id number]  Referring provider: Bekah Isaacs DO  Dx:   Encounter Diagnosis     ICD-10-CM    1. Arthritis of right ankle  M19.071       2. Posterior tibial tendon dysfunction (PTTD) of left lower extremity  M76.822       3. Pes planus of left foot  M21.42                        Assessment  Impairments: abnormal or restricted ROM, activity intolerance, impaired balance, impaired physical strength, lacks appropriate home exercise program and pain with function    Assessment details: Patient presents with arthritis of right ankle and pes planus of bilateral feet. Patient has shown improvement in PT demonstrating decreased pain, increased range of motion, increased strength, and increased tolerance to activity.  Patient continues to present with pain, decreased range of motion, decreased strength, and decreased tolerance to activity.  Patient would benefit from continued skilled PT services to address these issues and to maximize function. Thank you for this referral!    Goals  Impairment Goals:  1. Decrease right ankle pain to 0/10 with activities in 8 weeks  2. Increase right ankle inversion ROM by 5* in 8 weeks  3. Increase b/l lower extremity strength by ½ grade in 8 weeks  4. Pt will have improved ankle strength to 4/5 throughout in 3-4 weeks. new    Functional Goals:  1. Patient demonstrates independence with HEP upon discharge  2. Patient is able to ambulate household distances without right ankle pain in 8-12 weeks  3. Pt will be able to walk for 1/2 mile without ankle pain and without an assistive device in 3-4 weeks. New  4. Pt will be able to stand for 20 minutes without ankle pain in 3-4 weeks. new    Plan  Patient would benefit from: PT eval and skilled physical therapy    Planned therapy interventions: joint mobilization, manual therapy, neuromuscular re-education, home exercise program, therapeutic  "exercise, therapeutic training, patient education, balance/weight bearing training, activity modification, therapeutic activities and stretching    Frequency: 1x week  Duration in weeks: 8  Plan of Care beginning date: 2025  Plan of Care expiration date: 3/26/2025  Treatment plan discussed with: patient        Subjective Evaluation    History of Present Illness  Mechanism of injury: Sandy has experienced good improvements in ankle mobility, ability to ambulate with more confidence and without a cane as compared to needed one when first starting. She has fallen since her IE but notes that in the past few weeks she has felt more stable.     67 yo female presents with c/o right ankle arthritis impacting quality of life. Pt reports she has been wearing a \"horizon\" left ankle/foot brace for the last 6-7 years due to a collapsing arch. Pt notes no brace and orthotic has been prescribed for her right foot. Pt also c/o feeling unsteady or \"bouncy\" on her feet and would like to improve her physical fitness and balance.  Patient Goals  Patient goals for therapy: improved balance, increased motion and independence with ADLs/IADLs    Pain  Current pain ratin  At best pain ratin  At worst pain ratin (min afternoon)  Location: Right Ankle  Quality: dull ache and tight  Aggravating factors: standing, walking and stair climbing  Progression: improved        Objective     Active Range of Motion   Left Ankle/Foot   Dorsiflexion (ke): 20 degrees   Dorsiflexion (kf): 23 degrees   Inversion: 15 degrees   Eversion: 6 degrees     Right Ankle/Foot   Dorsiflexion (ke): 22 degrees   Dorsiflexion (kf): 25 degrees   Inversion: 20 degrees with pain  Eversion: 5 degrees     Strength/Myotome Testing     Left Hip   Planes of Motion   Flexion: 3+  Abduction: 3+  Adduction: 4  External rotation: 4+    Right Hip   Planes of Motion   Flexion: 4-  Abduction: 3-  Adduction: 4-  External rotation: 4    Left Knee   Flexion: " "4-  Extension: 4+    Right Knee   Flexion: 4+ (left lower leg pain)  Extension: 4+    Left Ankle/Foot   Dorsiflexion: 4+  Plantar flexion: 3+  Inversion: 3+  Eversion: 3+  Great toe flexion: 4  Great toe extension: 4+    Right Ankle/Foot   Dorsiflexion: 4+  Plantar flexion: 3+  Inversion: 3+  Eversion: 3+  Great toe flexion: 4  Great toe extension: 4+    Additional Strength Details  SLR w/ ER: 4-/5 R, 3/5 L    Ambulation     Comments   Pt ambulates with a SPC and soft left ankle brace. Left LE is excessively externally rotated throughout gait and mild increase in right hip ER with ambulation. Right foot pronates during stance phase.           Precautions: none        Manuals 9/5 9/12 10/10 10/31 11/14 11/21 12/12 12/19 1/15 1/29   Bilateral Ankle/foot PROM/mobs RN RN RN RN RN RN RN RN RN RN   Left Ankle/Foot                                                                       Neuro Re-Ed                       Ankle DF/PF                       Ankle Inv Iso                       TA SLR                       Bridges                       Ankle balance board 10x a/p, side 10x a/p, side X10 a/p, side large [place] X10 a/p, side large [place] X[place] a/p, side large [place] X[place] a/p, side large [place] X[place] a/p, side large [place] X[place] a/p, side large [place] X[place] a/p, side 10x ap, side   Semi tandem stance - EC R ant 2x20'' R ant 2x20'' R ant 2x20'' R ant 2x20'' R any 2x20'' R ant 3x20'' R ant 3x20'' R ant 3x20'' 15x3'' R ant 2x20\"   Wobble board standing 2x10 a/p, side 2x10 a/p, side 2x10 a/p, side 2x10 a/p, side 2x10 a/p, side 2x10 a/p, side 2x10 a/p, side 2x10, a/p, side 2x10 a/p, side 2x10 ap, side   Standing tandem stance                       NBOS EO/EC 3x15'' w/ head turns (L/R, U/D) 3x15'' w/ head turns (L/R, U/D) 3x15'' w/ head turns (L/R, U/D) 3x15'' w/ head turns (L/R, U/D) 3x15'' w/ head turns (L/R, U/D) 3x15'' w/ head turns (L/R, U/D) 3x15'' w/ head turns (L/R, U/D) 3x15'' w/ head turns (L/R, U/D) 3x15'' w/ head turns " 3x15'' w/ head turns (L/R, U/D) on foam   Tandem walking 4 laps 10' 4 laps  4 laps 4 laps 4 laps 4 laps 4 laps 4 laps 2 laps 10' 8 laps 10'   Standing calf s' 10x10'' ea 10x10'' ea 10x10'' ea 10x10'' ea 10x10'' ea 10x10'' ea 10x10'' ea 10x10'' ea 10x10'' ea 10x10''   NBOS on foam                 3x30'' EC 3x30'' EC                                                   Ther Ex                       Balance assessment                       Ankle IV PROM                       HR/TR 2x10 standing 2x10 standing 2x10  2x10 2x10       2x10 standing 2x10 standing   Seated DF/PF   2x10 Black TB ea 2x10 Black TB 2x10 Black TB 2x10 Black TB ea   2x10 Black TB ea 2x10 Black TB ea 2x10 Black TB ea 2x10 Black TB   HEP                   Gastroc and Plantar fascia towel stretch   Bike (Cardiovascular Endurance)                                                                          Ther Activity                                                                       Gait Training                                                                       Modalities

## 2025-02-06 ENCOUNTER — APPOINTMENT (OUTPATIENT)
Dept: PHYSICAL THERAPY | Facility: CLINIC | Age: 70
End: 2025-02-06
Payer: MEDICARE

## 2025-02-13 ENCOUNTER — OFFICE VISIT (OUTPATIENT)
Dept: PHYSICAL THERAPY | Facility: CLINIC | Age: 70
End: 2025-02-13
Payer: MEDICARE

## 2025-02-13 DIAGNOSIS — M21.42 PES PLANUS OF LEFT FOOT: ICD-10-CM

## 2025-02-13 DIAGNOSIS — M19.071 ARTHRITIS OF RIGHT ANKLE: Primary | ICD-10-CM

## 2025-02-13 DIAGNOSIS — M76.822 POSTERIOR TIBIAL TENDON DYSFUNCTION (PTTD) OF LEFT LOWER EXTREMITY: ICD-10-CM

## 2025-02-13 PROCEDURE — 97140 MANUAL THERAPY 1/> REGIONS: CPT | Performed by: PHYSICAL THERAPIST

## 2025-02-13 PROCEDURE — 97110 THERAPEUTIC EXERCISES: CPT | Performed by: PHYSICAL THERAPIST

## 2025-02-13 PROCEDURE — 97112 NEUROMUSCULAR REEDUCATION: CPT | Performed by: PHYSICAL THERAPIST

## 2025-02-13 NOTE — PROGRESS NOTES
"Daily Note     Today's date: 2025  Patient name: Sandy Mcgovern  : 1955  MRN: [id number]  Referring provider: Bekah Isaacs DO  Dx:   Encounter Diagnosis     ICD-10-CM    1. Arthritis of right ankle  M19.071       2. Posterior tibial tendon dysfunction (PTTD) of left lower extremity  M76.822       3. Pes planus of left foot  M21.42                      Subjective: Sandy explains that her vision prevented her from coming last week, she is feeling better today.      Objective: See treatment diary below      Assessment: Tolerated treatment well. Patient demonstrated fatigue post treatment and exhibited good technique with therapeutic exercises. Sandy responded well to all balance challenges this session, noted sway anterior to posterior during head turns on dynamic surface. Fatigue in the legs noted upon completion.      Plan: Continue per plan of care.      Precautions: none        Manuals    Bilateral Ankle/foot PROM/mobs RN         RN   Left Ankle/Foot                                               Neuro Re-Ed               Ankle DF/PF               Ankle Inv Iso               TA SLR               Bridges               Ankle balance board 10x a/p, side         10x ap, side   Semi tandem stance - EC R ant 2x20''         R ant 2x20\"   Wobble board standing 2x10 a/p, side         2x10 ap, side   Standing tandem stance               NBOS EO/EC 3x15'' w/ head turns (L/R, U/D)         3x15'' w/ head turns (L/R, U/D) on foam   Tandem walking 8 laps 10'         8 laps 10'   Standing calf s' 10x10'' ea         10x10''   NBOS on foam  3x30'' EC         3x30'' EC                                   Ther Ex               Balance assessment               Ankle IV PROM               HR/TR 2x10 standing         2x10 standing   Seated DF/PF           2x10 Black TB   HEP           Gastroc and Plantar fascia towel stretch   Bike (Cardiovascular Endurance)                                             "                      Ther Activity                                                                       Gait Training                                                                       Modalities

## 2025-02-20 ENCOUNTER — APPOINTMENT (OUTPATIENT)
Dept: PHYSICAL THERAPY | Facility: CLINIC | Age: 70
End: 2025-02-20
Payer: MEDICARE

## 2025-02-26 ENCOUNTER — HOSPITAL ENCOUNTER (OUTPATIENT)
Dept: MAMMOGRAPHY | Facility: CLINIC | Age: 70
Discharge: HOME | End: 2025-02-26
Payer: MEDICARE

## 2025-02-26 VITALS — BODY MASS INDEX: 29.02 KG/M2 | WEIGHT: 170 LBS | HEIGHT: 64 IN

## 2025-02-26 DIAGNOSIS — Z12.31 ENCOUNTER FOR SCREENING MAMMOGRAM FOR MALIGNANT NEOPLASM OF BREAST: ICD-10-CM

## 2025-02-26 PROCEDURE — 77067 SCR MAMMO BI INCL CAD: CPT

## 2025-02-26 PROCEDURE — 77063 BREAST TOMOSYNTHESIS BI: CPT

## 2025-02-27 ENCOUNTER — RESULTS FOLLOW-UP (OUTPATIENT)
Dept: OBGYN CLINIC | Facility: CLINIC | Age: 70
End: 2025-02-27

## 2025-02-27 ENCOUNTER — OFFICE VISIT (OUTPATIENT)
Dept: PHYSICAL THERAPY | Facility: CLINIC | Age: 70
End: 2025-02-27
Payer: MEDICARE

## 2025-02-27 DIAGNOSIS — M21.42 PES PLANUS OF LEFT FOOT: ICD-10-CM

## 2025-02-27 DIAGNOSIS — M19.071 ARTHRITIS OF RIGHT ANKLE: Primary | ICD-10-CM

## 2025-02-27 DIAGNOSIS — M76.822 POSTERIOR TIBIAL TENDON DYSFUNCTION (PTTD) OF LEFT LOWER EXTREMITY: ICD-10-CM

## 2025-02-27 PROCEDURE — 97140 MANUAL THERAPY 1/> REGIONS: CPT

## 2025-02-27 PROCEDURE — 97110 THERAPEUTIC EXERCISES: CPT

## 2025-02-27 PROCEDURE — 97112 NEUROMUSCULAR REEDUCATION: CPT

## 2025-02-27 NOTE — PROGRESS NOTES
"Daily Note     Today's date: 2025  Patient name: Sandy Mcgovern  : 1955  MRN: [id number]  Referring provider: Bekah Isaacs DO  Dx:   Encounter Diagnosis     ICD-10-CM    1. Arthritis of right ankle  M19.071       2. Posterior tibial tendon dysfunction (PTTD) of left lower extremity  M76.822       3. Pes planus of left foot  M21.42           Start Time: 1332  Stop Time: 1415  Total time in clinic (min): 43 minutes    Subjective: Pt reports that her right ankle and left foot have been feeling \"good.\" -- \"I'm far much better since I started physical therapy.\"      Objective: See treatment diary below      Assessment: The pt participated in a skilled physical therapy session that focused on manual intervention, neuromuscular re-education, and therapeutic exercise. The pt presented with tenderness along the plantar fascia centrally near the arch bilaterally, LLE greater than RLE. Manual intervention was provided, per pt tolerance, to improve foot/ankle mobility. She tolerated treatment well. The pt would benefit from continued PT to address impairments in order to improve her quality of life and return to PLOF without limitations due to pain.      Plan: Continue per plan of care.  Continue POC as per primary PT.     Precautions: none        Manuals    Bilateral Ankle/foot PROM/mobs RN [place]        RN   Left Ankle/Foot                                               Neuro Re-Ed               Ankle DF/PF               Ankle Inv Iso               TA SLR               Bridges               Ankle balance board 10x a/p, side 20x a/p, side        10x ap, side   Semi tandem stance - EC R ant 2x20''         R ant 2x20\"   Wobble board standing 2x10 a/p, side 2x10 a/p, side        2x10 ap, side   Standing tandem stance               NBOS EO/EC 3x15'' w/ head turns (L/R, U/D) 3x15'' w/ head turns (L/R, U/D)        3x15'' w/ head turns (L/R, U/D) on foam   Tandem walking 8 laps 10'         8 laps " "10'   Standing calf s' 10x10'' ea         10x10''   NBOS on foam  3x30'' EC 3x30\" EC        3x30'' EC                                   Ther Ex               Balance assessment               Ankle IV PROM               HR/TR 2x10 standing 2x10 standing        2x10 standing   Seated DF/PF           2x10 Black TB   HEP           Gastroc and Plantar fascia towel stretch   Bike (Cardiovascular Endurance)                                                                  Ther Activity                                                                       Gait Training                                                                       Modalities                                                                                 "

## 2025-03-02 DIAGNOSIS — E78.5 HYPERLIPIDEMIA, UNSPECIFIED HYPERLIPIDEMIA TYPE: ICD-10-CM

## 2025-03-04 ENCOUNTER — OFFICE VISIT (OUTPATIENT)
Dept: PHYSICAL THERAPY | Facility: CLINIC | Age: 70
End: 2025-03-04
Payer: MEDICARE

## 2025-03-04 DIAGNOSIS — M76.822 POSTERIOR TIBIAL TENDON DYSFUNCTION (PTTD) OF LEFT LOWER EXTREMITY: ICD-10-CM

## 2025-03-04 DIAGNOSIS — M19.071 ARTHRITIS OF RIGHT ANKLE: Primary | ICD-10-CM

## 2025-03-04 DIAGNOSIS — M21.42 PES PLANUS OF LEFT FOOT: ICD-10-CM

## 2025-03-04 PROCEDURE — 97112 NEUROMUSCULAR REEDUCATION: CPT | Performed by: PHYSICAL THERAPIST

## 2025-03-04 PROCEDURE — 97140 MANUAL THERAPY 1/> REGIONS: CPT | Performed by: PHYSICAL THERAPIST

## 2025-03-04 PROCEDURE — 97110 THERAPEUTIC EXERCISES: CPT | Performed by: PHYSICAL THERAPIST

## 2025-03-04 RX ORDER — PRAVASTATIN SODIUM 20 MG
20 TABLET ORAL DAILY
Qty: 90 TABLET | Refills: 1 | Status: SHIPPED | OUTPATIENT
Start: 2025-03-04

## 2025-03-04 NOTE — PROGRESS NOTES
"Daily Note     Today's date: 3/4/2025  Patient name: Sandy Mcgovern  : 1955  MRN: [id number]  Referring provider: Bekah Isaacs DO  Dx:   Encounter Diagnosis     ICD-10-CM    1. Arthritis of right ankle  M19.071       2. Posterior tibial tendon dysfunction (PTTD) of left lower extremity  M76.822       3. Pes planus of left foot  M21.42                      Subjective: Sandy explains that her walking continues to feel well, she is feeling good overall this morning.       Objective: See treatment diary below      Assessment: Tolerated treatment well. Patient demonstrated fatigue post treatment and exhibited good technique with therapeutic exercises. Continued challenge during balance TE and fatigue upon completion. Slightl discomfort noted in posterior aspect of left knee and with passive plantar flexion of left ankle. Continue to progress balance and mobility nv.      Plan: Continue per plan of care.      Precautions: none        Manuals 2/13 2/27 3/4       1/29   Bilateral Ankle/foot PROM/mobs RN KS RN       RN   Left Ankle/Foot                                               Neuro Re-Ed               Ankle DF/PF               Ankle Inv Iso               TA SLR               Bridges               Ankle balance board 10x a/p, side 20x a/p, side 20x a/p, side       10x ap, side   Semi tandem stance - EC R ant 2x20''         R ant 2x20\"   Wobble board standing 2x10 a/p, side 2x10 a/p, side 2x1 a/p, side       2x10 ap, side   Standing tandem stance               NBOS EO/EC 3x15'' w/ head turns (L/R, U/D) 3x15'' w/ head turns (L/R, U/D) 3x15'' w/ head turns (L/R, U/D)       3x15'' w/ head turns (L/R, U/D) on foam   Tandem walking 8 laps 10'         8 laps 10'   Standing calf s' 10x10'' ea         10x10''   NBOS on foam  3x30'' EC 3x30\" EC 3x30'' EC       3x30'' EC                                   Ther Ex               Balance assessment               Ankle IV PROM               HR/TR 2x10 standing 2x10 " standing 2x10 standing       2x10 standing   Seated DF/PF           2x10 Black TB   HEP           Gastroc and Plantar fascia towel stretch   Bike (Cardiovascular Endurance)                                                                  Ther Activity                                                                       Gait Training                                                                       Modalities

## 2025-03-13 ENCOUNTER — APPOINTMENT (OUTPATIENT)
Dept: PHYSICAL THERAPY | Facility: CLINIC | Age: 70
End: 2025-03-13
Payer: MEDICARE

## 2025-03-13 ENCOUNTER — OFFICE VISIT (OUTPATIENT)
Dept: OBGYN CLINIC | Facility: CLINIC | Age: 70
End: 2025-03-13
Payer: MEDICARE

## 2025-03-13 VITALS
SYSTOLIC BLOOD PRESSURE: 124 MMHG | WEIGHT: 171 LBS | HEIGHT: 64 IN | BODY MASS INDEX: 29.19 KG/M2 | DIASTOLIC BLOOD PRESSURE: 78 MMHG

## 2025-03-13 DIAGNOSIS — N95.2 VAGINAL ATROPHY: ICD-10-CM

## 2025-03-13 DIAGNOSIS — N81.3 UTERINE PROCIDENTIA: Primary | ICD-10-CM

## 2025-03-13 DIAGNOSIS — Z96.0 VAGINAL PESSARY IN SITU: ICD-10-CM

## 2025-03-13 PROCEDURE — 99213 OFFICE O/P EST LOW 20 MIN: CPT | Performed by: STUDENT IN AN ORGANIZED HEALTH CARE EDUCATION/TRAINING PROGRAM

## 2025-03-13 NOTE — ASSESSMENT & PLAN NOTE
- Continue Trimo-Gu twice weekly.   - Pessary removed, cleaned, and replaced.   - Return to office for routine pessary maintenance in 3 months.

## 2025-03-13 NOTE — PROGRESS NOTES
"Name: Sandy Mcgovern      : 1955      MRN: [id number]  Encounter Provider: Jeremiah Peres MD  Encounter Date: 3/13/2025   Encounter department: [facility] [place]  :  Assessment & Plan  Uterine procidentia    Orders:    Pessary    Vaginal pessary in situ  - Continue Trimo-Gu twice weekly.   - Pessary removed, cleaned, and replaced.   - Return to office for routine pessary maintenance in 3 months.        Vaginal atrophy  - Continue Estrace cream twice weekly.       Add  Addendum: Following exam, patient returned to desk stating she experienced bright red bleeding when she went to the bathroom prior to leaving office. Patient was therefore brought back to exam room, pessary removed, and exam repeated. Redundant vaginal mucosa precluded visualization of fornices, but a small amount of bright red blood was noted on pessary. Suspect superficial abrasion from removal/replacement. Pessary was replaced and patient instructed that if bleeding recurs, she should call the office. Otherwise, will return in 1 month for re-evaluation.     History of Present Illness   HPI  Sandy Mcgovern is a 69 y.o. female who presents for pessary maintenance. She denies vaginal discharge or bleeding.   History obtained from: patient    Review of Systems   All other systems reviewed and are negative.    Medical History Reviewed by provider this encounter:  Tobacco  Med Hx  Surg Hx  Fam Hx  Soc Hx    .     Objective   /78 (BP Location: Left arm, Patient Position: Sitting, Cuff Size: Standard)   Ht 5' 3.5\" (1.613 m)   Wt 77.6 kg (171 lb)   LMP  (LMP Unknown)   BMI 29.82 kg/m²      Physical Exam  Vitals reviewed. Exam conducted with a chaperone present (Veronica Elliott MA).   Constitutional:       Appearance: Normal appearance.   Cardiovascular:      Rate and Rhythm: Normal rate.   Pulmonary:      Effort: Pulmonary effort is normal.   Genitourinary:     Exam position: Lithotomy position.      " Labia:         Right: No rash or tenderness.         Left: No rash or tenderness.       Vagina: Prolapsed vaginal walls present. No vaginal discharge, erythema, tenderness, bleeding or lesions.      Cervix: Normal. No cervical motion tenderness, discharge, friability, lesion, erythema or cervical bleeding.      Uterus: Normal. With uterine prolapse.       Adnexa: Right adnexa normal and left adnexa normal.        Right: No mass, tenderness or fullness.          Left: No mass, tenderness or fullness.        Comments: Complete procidentia. Atrophic mucosa. No erosions. Normal-appearing physiologic discharge. No bleeding noted.   Neurological:      General: No focal deficit present.      Mental Status: She is alert and oriented to person, place, and time.   Psychiatric:         Mood and Affect: Mood normal.         Behavior: Behavior normal.         Thought Content: Thought content normal.         Judgment: Judgment normal.     Pessary    Date/Time: 3/13/2025 2:30 PM    Performed by: Jeremiah Peres MD  Authorized by: Jeremiah Peres MD  Universal Protocol:  procedure performed by consultantConsent: Verbal consent obtained.  Patient understanding: patient states understanding of the procedure being performed  Patient consent: the patient's understanding of the procedure matches consent given  Procedure consent: procedure consent matches procedure scheduled  Patient identity confirmed: verbally with patient    Indication:     Indication for pessary: uterine prolapse and cystocele    Pre-procedure:     Pessary procedure type:  Cleaning/check  Problems:     Pessary complications: none    Procedure:     Pessary type:  Ring w/ support    Pessary size:  7    Patient tolerance of procedure:  Tolerated well, no immediate complications

## 2025-03-18 ENCOUNTER — OFFICE VISIT (OUTPATIENT)
Dept: PHYSICAL THERAPY | Facility: CLINIC | Age: 70
End: 2025-03-18
Payer: MEDICARE

## 2025-03-18 DIAGNOSIS — M21.42 PES PLANUS OF LEFT FOOT: ICD-10-CM

## 2025-03-18 DIAGNOSIS — M19.071 ARTHRITIS OF RIGHT ANKLE: Primary | ICD-10-CM

## 2025-03-18 DIAGNOSIS — M76.822 POSTERIOR TIBIAL TENDON DYSFUNCTION (PTTD) OF LEFT LOWER EXTREMITY: ICD-10-CM

## 2025-03-18 PROCEDURE — 97140 MANUAL THERAPY 1/> REGIONS: CPT | Performed by: PHYSICAL THERAPIST

## 2025-03-18 PROCEDURE — 97110 THERAPEUTIC EXERCISES: CPT | Performed by: PHYSICAL THERAPIST

## 2025-03-18 PROCEDURE — 97112 NEUROMUSCULAR REEDUCATION: CPT | Performed by: PHYSICAL THERAPIST

## 2025-03-18 NOTE — PROGRESS NOTES
"Daily Note     Today's date: 3/18/2025  Patient name: Sandy Mcgovern  : 1955  MRN: [id number]  Referring provider: Bekah Isaacs DO  Dx:   Encounter Diagnosis     ICD-10-CM    1. Arthritis of right ankle  M19.071       2. Posterior tibial tendon dysfunction (PTTD) of left lower extremity  M76.822       3. Pes planus of left foot  M21.42                      Subjective: Sandy explains that she is doing very well, had good news regarding a home for her son.       Objective: See treatment diary below      Assessment: Tolerated treatment well. Patient demonstrated fatigue post treatment and exhibited good technique with therapeutic exercises. Continued challenge during balance TE and fatigue upon completion. No loss of balance noted this morning. Plan on progressing and adding in more challenging balance TE nv.    Plan: Continue per plan of care.      Precautions: none        Manuals 2/13 2/27 3/4 3/18      1/29   Bilateral Ankle/foot PROM/mobs RN KS RN RN      RN   Left Ankle/Foot                                               Neuro Re-Ed               Ankle DF/PF               Ankle Inv Iso               TA SLR               Bridges               Ankle balance board 10x a/p, side 20x a/p, side 20x a/p, side 20x a/p, side      10x ap, side   Semi tandem stance - EC R ant 2x20''         R ant 2x20\"   Wobble board standing 2x10 a/p, side 2x10 a/p, side 2x1 a/p, side 2x10 a/o, side      2x10 ap, side   Standing tandem stance               NBOS EO/EC 3x15'' w/ head turns (L/R, U/D) 3x15'' w/ head turns (L/R, U/D) 3x15'' w/ head turns (L/R, U/D) 3x15'' w/ head turns (L/R, U/D)      3x15'' w/ head turns (L/R, U/D) on foam   Tandem walking 8 laps 10'   nv      8 laps 10'   Standing calf s' 10x10'' ea         10x10''   NBOS on foam  3x30'' EC 3x30\" EC 3x30'' EC 3x30'' EC      3x30'' EC   BOSU lunges     nv          Marches on foam     nv          Toe taps    nv                                                Ther " Ex               Balance assessment               Ankle IV PROM               HR/TR 2x10 standing 2x10 standing 2x10 standing 2x10 standing      2x10 standing   Seated DF/PF           2x10 Black TB   HEP           Gastroc and Plantar fascia towel stretch   Bike (Cardiovascular Endurance)                                                                  Ther Activity                                                                       Gait Training                                                                       Modalities

## 2025-03-25 ENCOUNTER — OFFICE VISIT (OUTPATIENT)
Dept: PHYSICAL THERAPY | Facility: CLINIC | Age: 70
End: 2025-03-25
Payer: MEDICARE

## 2025-03-25 DIAGNOSIS — M19.071 ARTHRITIS OF RIGHT ANKLE: Primary | ICD-10-CM

## 2025-03-25 DIAGNOSIS — M21.42 PES PLANUS OF LEFT FOOT: ICD-10-CM

## 2025-03-25 DIAGNOSIS — M76.822 POSTERIOR TIBIAL TENDON DYSFUNCTION (PTTD) OF LEFT LOWER EXTREMITY: ICD-10-CM

## 2025-03-25 PROCEDURE — 97140 MANUAL THERAPY 1/> REGIONS: CPT | Performed by: PHYSICAL THERAPIST

## 2025-03-25 PROCEDURE — 97112 NEUROMUSCULAR REEDUCATION: CPT | Performed by: PHYSICAL THERAPIST

## 2025-03-25 PROCEDURE — 97110 THERAPEUTIC EXERCISES: CPT | Performed by: PHYSICAL THERAPIST

## 2025-03-25 NOTE — PROGRESS NOTES
"Daily Note     Today's date: 3/25/2025  Patient name: Sandy Mcgovern  : 1955  MRN: [id number]  Referring provider: Bekah Isaasc DO  Dx:   Encounter Diagnosis     ICD-10-CM    1. Arthritis of right ankle  M19.071       2. Posterior tibial tendon dysfunction (PTTD) of left lower extremity  M76.822       3. Pes planus of left foot  M21.42                      Subjective: Sandy explains that she is doing very well, had good news regarding a home for her son.       Objective: See treatment diary below      Assessment: Tolerated treatment well. Patient demonstrated fatigue post treatment and exhibited good technique with therapeutic exercises. Continued challenge during balance TE and fatigue upon completion. Slight loss of balance during vertical head nods as well and during vision eliminated balance on foam. Plan on progressing and adding in more challenging balance TE nv.    Plan: Continue per plan of care.      Precautions: none        Manuals 2/13 2/27 3/4 3/18 3/25     1/29   Bilateral Ankle/foot PROM/mobs RN KS RN RN RN     RN   Left Ankle/Foot                                               Neuro Re-Ed               Ankle DF/PF               Ankle Inv Iso               TA SLR               Bridges               Ankle balance board 10x a/p, side 20x a/p, side 20x a/p, side 20x a/p, side 2x10 a/p, side     10x ap, side   Semi tandem stance - EC R ant 2x20''         R ant 2x20\"   Wobble board standing 2x10 a/p, side 2x10 a/p, side 2x1 a/p, side 2x10 a/o, side 2x10 a/p, side     2x10 ap, side   Standing tandem stance               NBOS EO/EC 3x15'' w/ head turns (L/R, U/D) 3x15'' w/ head turns (L/R, U/D) 3x15'' w/ head turns (L/R, U/D) 3x15'' w/ head turns (L/R, U/D) 3x15'' w/ head turns (L/R, U/D)     3x15'' w/ head turns (L/R, U/D) on foam   Tandem walking 8 laps 10'   nv      8 laps 10'   Standing calf s' 10x10'' ea         10x10''   NBOS on foam  3x30'' EC 3x30\" EC 3x30'' EC 3x30'' EC 3x30'' EC     " 3x30'' EC   BOSU lunges     nv nv         Marches on foam     nv nv         Toe taps    nv nv                                               Ther Ex               Balance assessment               Ankle IV PROM               HR/TR 2x10 standing 2x10 standing 2x10 standing 2x10 standing 2x10 standing     2x10 standing   Seated DF/PF           2x10 Black TB   HEP           Gastroc and Plantar fascia towel stretch   Bike (Cardiovascular Endurance)                                                                  Ther Activity                                                                       Gait Training                                                                       Modalities

## 2025-04-01 ENCOUNTER — OFFICE VISIT (OUTPATIENT)
Dept: PHYSICAL THERAPY | Facility: CLINIC | Age: 70
End: 2025-04-01
Payer: MEDICARE

## 2025-04-01 DIAGNOSIS — M21.42 PES PLANUS OF LEFT FOOT: ICD-10-CM

## 2025-04-01 DIAGNOSIS — M76.822 POSTERIOR TIBIAL TENDON DYSFUNCTION (PTTD) OF LEFT LOWER EXTREMITY: ICD-10-CM

## 2025-04-01 DIAGNOSIS — M19.071 ARTHRITIS OF RIGHT ANKLE: Primary | ICD-10-CM

## 2025-04-01 PROCEDURE — 97110 THERAPEUTIC EXERCISES: CPT | Performed by: PHYSICAL THERAPIST

## 2025-04-01 PROCEDURE — 97140 MANUAL THERAPY 1/> REGIONS: CPT | Performed by: PHYSICAL THERAPIST

## 2025-04-01 PROCEDURE — 97112 NEUROMUSCULAR REEDUCATION: CPT | Performed by: PHYSICAL THERAPIST

## 2025-04-01 NOTE — PROGRESS NOTES
"Daily Note     Today's date: 2025  Patient name: Sandy Mcgovern  : 1955  MRN: [id number]  Referring provider: Bekah Isaacs DO  Dx:   Encounter Diagnosis     ICD-10-CM    1. Arthritis of right ankle  M19.071       2. Posterior tibial tendon dysfunction (PTTD) of left lower extremity  M76.822       3. Pes planus of left foot  M21.42                      Subjective: Sandy explains that she is doing very well, had good news regarding a home for her son.       Objective: See treatment diary below      Assessment: Tolerated treatment well. Patient demonstrated fatigue post treatment and exhibited good technique with therapeutic exercises. Continued challenge during balance TE and fatigue upon completion. Improvement noted during standing balance TE, no loss of balance during session. Plan on progressing and adding in more challenging balance TE nv.    Plan: Continue per plan of care.      Precautions: none        Manuals 2/13 2/27 3/4 3/18 3/25 4/1    1/29   Bilateral Ankle/foot PROM/mobs RN KS RN RN RN RN    RN   Left Ankle/Foot                                               Neuro Re-Ed               Ankle DF/PF               Ankle Inv Iso               TA SLR               Bridges               Ankle balance board 10x a/p, side 20x a/p, side 20x a/p, side 20x a/p, side 2x10 a/p, side 2x10 a/p, side    10x ap, side   Semi tandem stance - EC R ant 2x20''         R ant 2x20\"   Wobble board standing 2x10 a/p, side 2x10 a/p, side 2x1 a/p, side 2x10 a/o, side 2x10 a/p, side 2x10 a/p, side    2x10 ap, side   Standing tandem stance               NBOS EO/EC 3x15'' w/ head turns (L/R, U/D) 3x15'' w/ head turns (L/R, U/D) 3x15'' w/ head turns (L/R, U/D) 3x15'' w/ head turns (L/R, U/D) 3x15'' w/ head turns (L/R, U/D) 3x15'' w/ head turns (L/R, U/D)    3x15'' w/ head turns (L/R, U/D) on foam   Tandem walking 8 laps 10'   nv      8 laps 10'   Standing calf s' 10x10'' ea         10x10''   NBOS on foam  3x30'' EC " "3x30\" EC 3x30'' EC 3x30'' EC 3x30'' EC 3x30'' EC    3x30'' EC   BOSU lunges      nv nv        Marches on foam      nv nv        Toe taps     nv nv                                              Ther Ex               Balance assessment               Ankle IV PROM               HR/TR 2x10 standing 2x10 standing 2x10 standing 2x10 standing 2x10 standing 2x10 standing    2x10 standing   Seated DF/PF       2x10 Black TB    2x10 Black TB   HEP           Gastroc and Plantar fascia towel stretch   Bike (Cardiovascular Endurance)                                                                  Ther Activity                                                                       Gait Training                                                                       Modalities                                                                                   "

## 2025-04-08 ENCOUNTER — OFFICE VISIT (OUTPATIENT)
Dept: PHYSICAL THERAPY | Facility: CLINIC | Age: 70
End: 2025-04-08
Payer: MEDICARE

## 2025-04-08 DIAGNOSIS — M21.42 PES PLANUS OF LEFT FOOT: ICD-10-CM

## 2025-04-08 DIAGNOSIS — M19.071 ARTHRITIS OF RIGHT ANKLE: Primary | ICD-10-CM

## 2025-04-08 DIAGNOSIS — M76.822 POSTERIOR TIBIAL TENDON DYSFUNCTION (PTTD) OF LEFT LOWER EXTREMITY: ICD-10-CM

## 2025-04-08 PROCEDURE — 97112 NEUROMUSCULAR REEDUCATION: CPT | Performed by: PHYSICAL THERAPIST

## 2025-04-08 PROCEDURE — 97110 THERAPEUTIC EXERCISES: CPT | Performed by: PHYSICAL THERAPIST

## 2025-04-08 PROCEDURE — 97140 MANUAL THERAPY 1/> REGIONS: CPT | Performed by: PHYSICAL THERAPIST

## 2025-04-08 NOTE — PROGRESS NOTES
"Daily Note     Today's date: 2025  Patient name: Sandy Mcgovern  : 1955  MRN: [id number]  Referring provider: Bekah Isaacs DO  Dx:   Encounter Diagnosis     ICD-10-CM    1. Arthritis of right ankle  M19.071       2. Posterior tibial tendon dysfunction (PTTD) of left lower extremity  M76.822       3. Pes planus of left foot  M21.42                      Subjective: Sandy explains that her left ankle is a bit more sore today but thinks it may be because of the cold and rainy weather.      Objective: See treatment diary below      Assessment: Tolerated treatment well. Patient demonstrated fatigue post treatment and exhibited good technique with therapeutic exercises. Despite soreness subjectively, continued to maintain her range of motion and balance tolerance on foam pad. Add in more dynamic exercises on unstable surface nv.      Plan: Continue per plan of care.      Precautions: none        Manuals 2/13 2/27 3/4 3/18 3/25 4/1 4/8   1/29   Bilateral Ankle/foot PROM/mobs RN KS RN RN RN RN RN   RN   Left Ankle/Foot                                               Neuro Re-Ed               Ankle DF/PF               Ankle Inv Iso               TA SLR               Bridges               Ankle balance board 10x a/p, side 20x a/p, side 20x a/p, side 20x a/p, side 2x10 a/p, side 2x10 a/p, side 2x10 a/p, side   10x ap, side   Semi tandem stance - EC R ant 2x20''         R ant 2x20\"   Wobble board standing 2x10 a/p, side 2x10 a/p, side 2x1 a/p, side 2x10 a/o, side 2x10 a/p, side 2x10 a/p, side 2x10 a/p side   2x10 ap, side   Standing tandem stance               NBOS EO/EC 3x15'' w/ head turns (L/R, U/D) 3x15'' w/ head turns (L/R, U/D) 3x15'' w/ head turns (L/R, U/D) 3x15'' w/ head turns (L/R, U/D) 3x15'' w/ head turns (L/R, U/D) 3x15'' w/ head turns (L/R, U/D) 3x15'' w/ head turns (L/R, U/D)   3x15'' w/ head turns (L/R, U/D) on foam   Tandem walking 8 laps 10'   nv      8 laps 10'   Standing calf s' 10x10'' ea  " "       10x10''   NBOS on foam  3x30'' EC 3x30\" EC 3x30'' EC 3x30'' EC 3x30'' EC 3x30'' EC 3x30'' EC   3x30'' EC   BOSU lunges       nv nv       Marches on foam       nv nv       Toe taps      nv nv                                             Ther Ex               Balance assessment               Ankle IV PROM               HR/TR 2x10 standing 2x10 standing 2x10 standing 2x10 standing 2x10 standing 2x10 standing 2x10 standing ea   2x10 standing   Seated DF/PF       2x10 Black TB 2x10 Black TB   2x10 Black TB   HEP           Gastroc and Plantar fascia towel stretch   Bike (Cardiovascular Endurance)                                                                  Ther Activity                                                                       Gait Training                                                                       Modalities                                                                                     "

## 2025-04-17 ENCOUNTER — APPOINTMENT (OUTPATIENT)
Dept: PHYSICAL THERAPY | Facility: CLINIC | Age: 70
End: 2025-04-17
Payer: MEDICARE

## 2025-04-17 NOTE — PROGRESS NOTES
PT Re-Evaluation     Today's date: 2025  Patient name: Sandy Mcgovern  : 1955  MRN: [id number]  Referring provider: Bekah Isaacs DO  Dx:   No diagnosis found.                   Assessment  Impairments: abnormal or restricted ROM, activity intolerance, impaired balance, impaired physical strength, lacks appropriate home exercise program and pain with function    Assessment details: Patient presents with arthritis of right ankle and pes planus of bilateral feet. Patient has shown improvement in PT demonstrating decreased pain, increased range of motion, increased strength, and increased tolerance to activity.  Patient continues to present with pain, decreased range of motion, decreased strength, and decreased tolerance to activity.  Patient would benefit from continued skilled PT services to address these issues and to maximize function. Thank you for this referral!    Goals  Impairment Goals:  1. Decrease right ankle pain to 0/10 with activities in 8 weeks  2. Increase right ankle inversion ROM by 5* in 8 weeks  3. Increase b/l lower extremity strength by ½ grade in 8 weeks  4. Pt will have improved ankle strength to 4/5 throughout in 3-4 weeks. new    Functional Goals:  1. Patient demonstrates independence with HEP upon discharge  2. Patient is able to ambulate household distances without right ankle pain in 8-12 weeks  3. Pt will be able to walk for 1/2 mile without ankle pain and without an assistive device in 3-4 weeks. New  4. Pt will be able to stand for 20 minutes without ankle pain in 3-4 weeks. new    Plan  Patient would benefit from: PT eval and skilled physical therapy    Planned therapy interventions: joint mobilization, manual therapy, neuromuscular re-education, home exercise program, therapeutic exercise, therapeutic training, patient education, balance/weight bearing training, activity modification, therapeutic activities and stretching    Frequency: 1x week  Duration in weeks:  "8  Plan of Care beginning date: 2025  Plan of Care expiration date: 3/26/2025  Treatment plan discussed with: patient        Subjective Evaluation    History of Present Illness  Mechanism of injury: Sandy has experienced good improvements in ankle mobility, ability to ambulate with more confidence and without a cane as compared to needed one when first starting. She has fallen since her IE but notes that in the past few weeks she has felt more stable.     69 yo female presents with c/o right ankle arthritis impacting quality of life. Pt reports she has been wearing a \"horizon\" left ankle/foot brace for the last 6-7 years due to a collapsing arch. Pt notes no brace and orthotic has been prescribed for her right foot. Pt also c/o feeling unsteady or \"bouncy\" on her feet and would like to improve her physical fitness and balance.  Patient Goals  Patient goals for therapy: improved balance, increased motion and independence with ADLs/IADLs    Pain  Current pain ratin  At best pain ratin  At worst pain ratin (min afternoon)  Location: Right Ankle  Quality: dull ache and tight  Aggravating factors: standing, walking and stair climbing  Progression: improved        Objective     Active Range of Motion   Left Ankle/Foot   Dorsiflexion (ke): 20 degrees   Dorsiflexion (kf): 23 degrees   Inversion: 15 degrees   Eversion: 6 degrees     Right Ankle/Foot   Dorsiflexion (ke): 22 degrees   Dorsiflexion (kf): 25 degrees   Inversion: 20 degrees with pain  Eversion: 5 degrees     Strength/Myotome Testing     Left Hip   Planes of Motion   Flexion: 3+  Abduction: 3+  Adduction: 4  External rotation: 4+    Right Hip   Planes of Motion   Flexion: 4-  Abduction: 3-  Adduction: 4-  External rotation: 4    Left Knee   Flexion: 4-  Extension: 4+    Right Knee   Flexion: 4+ (left lower leg pain)  Extension: 4+    Left Ankle/Foot   Dorsiflexion: 4+  Plantar flexion: 3+  Inversion: 3+  Eversion: 3+  Great toe flexion: 4  Great " "toe extension: 4+    Right Ankle/Foot   Dorsiflexion: 4+  Plantar flexion: 3+  Inversion: 3+  Eversion: 3+  Great toe flexion: 4  Great toe extension: 4+    Additional Strength Details  SLR w/ ER: 4-/5 R, 3/5 L    Ambulation     Comments   Pt ambulates with a SPC and soft left ankle brace. Left LE is excessively externally rotated throughout gait and mild increase in right hip ER with ambulation. Right foot pronates during stance phase.           Precautions: none        Manuals 9/5 9/12 10/10 10/31 11/14 11/21 12/12 12/19 1/15 1/29   Bilateral Ankle/foot PROM/mobs RN RN RN RN RN RN RN RN RN RN   Left Ankle/Foot                                                                       Neuro Re-Ed                       Ankle DF/PF                       Ankle Inv Iso                       TA SLR                       Bridges                       Ankle balance board 10x a/p, side 10x a/p, side X10 a/p, side large [employer]  [employer] [place] X10 a/p, side large [employer] [place] X10 a/p, side large [employer] [place]  [employer] [place] X10 a/p, side large [employer] [place]  [employer] [place] X10 a/p, side large [employer] [place]  [employer] [place] X10 a/p, side large [employer] [place]  [employer] [place] X10 a/p, side 10x ap, side   Semi tandem stance - EC R ant 2x20'' R ant 2x20'' R ant 2x20'' R ant 2x20'' R any 2x20'' R ant 3x20'' R ant 3x20'' R ant 3x20'' 15x3'' R ant 2x20\"   Wobble board standing 2x10 a/p, side 2x10 a/p, side 2x10 a/p, side 2x10 a/p, side 2x10 a/p, side 2x10 a/p, side 2x10 a/p, side 2x10, a/p, side 2x10 a/p, side 2x10 ap, side   Standing tandem stance                       NBOS EO/EC 3x15'' w/ head turns (L/R, U/D) 3x15'' w/ head turns (L/R, U/D) 3x15'' w/ head turns (L/R, U/D) 3x15'' w/ head turns (L/R, U/D) 3x15'' w/ head turns (L/R, U/D) 3x15'' w/ head turns (L/R, U/D) 3x15'' w/ head turns (L/R, U/D) 3x15'' w/ head turns (L/R, U/D) 3x15'' w/ head turns 3x15'' w/ head turns (L/R, U/D) on foam   Tandem walking 4 laps 10' 4 laps  4 laps 4 laps 4 laps 4 laps 4 laps 4 laps 2 laps 10' 8 laps 10'   Standing calf s' 10x10'' ea 10x10'' ea 10x10'' ea 10x10'' " ea 10x10'' ea 10x10'' ea 10x10'' ea 10x10'' ea 10x10'' ea 10x10''   NBOS on foam                 3x30'' EC 3x30'' EC                                                   Ther Ex                       Balance assessment                       Ankle IV PROM                       HR/TR 2x10 standing 2x10 standing 2x10  2x10 2x10       2x10 standing 2x10 standing   Seated DF/PF   2x10 Black TB ea 2x10 Black TB 2x10 Black TB 2x10 Black TB ea   2x10 Black TB ea 2x10 Black TB ea 2x10 Black TB ea 2x10 Black TB   HEP                   Gastroc and Plantar fascia towel stretch   Bike (Cardiovascular Endurance)                                                                          Ther Activity                                                                       Gait Training                                                                       Modalities

## 2025-04-22 ENCOUNTER — EVALUATION (OUTPATIENT)
Dept: PHYSICAL THERAPY | Facility: CLINIC | Age: 70
End: 2025-04-22
Attending: FAMILY MEDICINE
Payer: MEDICARE

## 2025-04-22 DIAGNOSIS — M19.071 ARTHRITIS OF RIGHT ANKLE: Primary | ICD-10-CM

## 2025-04-22 DIAGNOSIS — M76.822 POSTERIOR TIBIAL TENDON DYSFUNCTION (PTTD) OF LEFT LOWER EXTREMITY: ICD-10-CM

## 2025-04-22 DIAGNOSIS — M21.42 PES PLANUS OF LEFT FOOT: ICD-10-CM

## 2025-04-22 PROCEDURE — 97140 MANUAL THERAPY 1/> REGIONS: CPT | Performed by: PHYSICAL THERAPIST

## 2025-04-22 PROCEDURE — 97110 THERAPEUTIC EXERCISES: CPT | Performed by: PHYSICAL THERAPIST

## 2025-04-22 PROCEDURE — 97164 PT RE-EVAL EST PLAN CARE: CPT | Performed by: PHYSICAL THERAPIST

## 2025-04-29 ENCOUNTER — APPOINTMENT (OUTPATIENT)
Dept: PHYSICAL THERAPY | Facility: CLINIC | Age: 70
End: 2025-04-29
Payer: MEDICARE

## 2025-06-19 DIAGNOSIS — Z96.0 VAGINAL PESSARY IN SITU: ICD-10-CM

## 2025-06-20 DIAGNOSIS — Z96.0 VAGINAL PESSARY IN SITU: ICD-10-CM

## 2025-06-20 RX ORDER — OXYQUINOLINE SULFATE AND SODIUM LAURYL SULFATE .25; .1 MG/G; MG/G
2 JELLY VAGINAL 2 TIMES WEEKLY
Qty: 113.4 G | Refills: 1 | OUTPATIENT
Start: 2025-06-23

## 2025-06-20 NOTE — TELEPHONE ENCOUNTER
Unfortunately Trimo-Gu Jelly is back-ordered and not available at this time.  I recommend using over the counter Replense or Rephresh odor eliminating gel.  Available at pharmacy until Trimo-gu is available again.  Please call patient to let her know and recommendation.

## 2025-06-20 NOTE — TELEPHONE ENCOUNTER
Patient called back, reviewed use of Replense or Rephresh in the meantime. She reports she has enough of her trimo-gunter for another 3 weeks.

## 2025-07-03 ENCOUNTER — OFFICE VISIT (OUTPATIENT)
Dept: OBGYN CLINIC | Facility: CLINIC | Age: 70
End: 2025-07-03
Payer: MEDICARE

## 2025-07-03 VITALS
HEIGHT: 64 IN | BODY MASS INDEX: 28 KG/M2 | WEIGHT: 164 LBS | DIASTOLIC BLOOD PRESSURE: 70 MMHG | SYSTOLIC BLOOD PRESSURE: 116 MMHG

## 2025-07-03 DIAGNOSIS — Z96.0 VAGINAL PESSARY IN SITU: ICD-10-CM

## 2025-07-03 DIAGNOSIS — N81.3 UTERINE PROCIDENTIA: Primary | ICD-10-CM

## 2025-07-03 DIAGNOSIS — N95.2 VAGINAL ATROPHY: ICD-10-CM

## 2025-07-03 PROCEDURE — 99213 OFFICE O/P EST LOW 20 MIN: CPT | Performed by: STUDENT IN AN ORGANIZED HEALTH CARE EDUCATION/TRAINING PROGRAM

## 2025-07-03 NOTE — ASSESSMENT & PLAN NOTE
- Continue Trimo-gunetr twice weekly. May use Replens or Rephresh while on backorder.  - Return to office for routine pessary maintenance in 3 months.    Orders:  •  Pessary

## (undated) DEVICE — MAXI PAD5.51 X 13.78 IN. (14.0 X 35.0 CM)HEAVYCONTOUREDUNSCENTED: Brand: CURITY

## (undated) DEVICE — ARTHROSCOPY FLOOR MAT

## (undated) DEVICE — GLOVE INDICATOR PI UNDERGLOVE SZ 7.5 BLUE

## (undated) DEVICE — NEEDLE SPINAL 22G X 3.5IN  QUINCKE

## (undated) DEVICE — SYRINGE 10ML LL CONTROL TOP

## (undated) DEVICE — CHLORHEXIDINE 4PCT 4 OZ

## (undated) DEVICE — PREMIUM DRY TRAY LF: Brand: MEDLINE INDUSTRIES, INC.

## (undated) DEVICE — GLOVE SRG BIOGEL 7

## (undated) DEVICE — STRL ALLENTOWN HYSTEROSCOPY PK: Brand: CARDINAL HEALTH